# Patient Record
Sex: MALE | Race: WHITE | Employment: OTHER | URBAN - METROPOLITAN AREA
[De-identification: names, ages, dates, MRNs, and addresses within clinical notes are randomized per-mention and may not be internally consistent; named-entity substitution may affect disease eponyms.]

---

## 2019-08-15 ENCOUNTER — HOSPITAL ENCOUNTER (EMERGENCY)
Age: 74
Discharge: HOME | End: 2019-08-15
Payer: MEDICARE

## 2019-08-15 VITALS
TEMPERATURE: 98 F | SYSTOLIC BLOOD PRESSURE: 140 MMHG | HEART RATE: 96 BPM | DIASTOLIC BLOOD PRESSURE: 96 MMHG | RESPIRATION RATE: 20 BRPM | OXYGEN SATURATION: 95 % | WEIGHT: 230 LBS

## 2019-08-15 DIAGNOSIS — S61.412A LACERATION OF LEFT HAND WITHOUT FOREIGN BODY, INITIAL ENCOUNTER: Primary | ICD-10-CM

## 2019-08-15 DIAGNOSIS — S51.812A SKIN TEAR OF LEFT FOREARM WITHOUT COMPLICATION, INITIAL ENCOUNTER: ICD-10-CM

## 2019-08-15 DIAGNOSIS — S40.022A CONTUSION OF LEFT UPPER EXTREMITY, INITIAL ENCOUNTER: ICD-10-CM

## 2019-08-15 PROCEDURE — 99283 EMERGENCY DEPT VISIT LOW MDM: CPT

## 2019-08-15 PROCEDURE — 6370000000 HC RX 637 (ALT 250 FOR IP): Performed by: NURSE PRACTITIONER

## 2019-08-15 RX ORDER — WARFARIN SODIUM 1 MG/1
1 TABLET ORAL
Status: ON HOLD | COMMUNITY
End: 2020-10-05 | Stop reason: HOSPADM

## 2019-08-15 RX ORDER — BACITRACIN, NEOMYCIN, POLYMYXIN B 400; 3.5; 5 [USP'U]/G; MG/G; [USP'U]/G
OINTMENT TOPICAL ONCE
Status: COMPLETED | OUTPATIENT
Start: 2019-08-15 | End: 2019-08-15

## 2019-08-15 RX ORDER — GINSENG 100 MG
CAPSULE ORAL
Qty: 1 TUBE | Refills: 1 | Status: ON HOLD | OUTPATIENT
Start: 2019-08-15 | End: 2020-10-05 | Stop reason: HOSPADM

## 2019-08-15 RX ADMIN — BACITRACIN, NEOMYCIN, POLYMYXIN B 1 G: 400; 3.5; 5 OINTMENT TOPICAL at 19:47

## 2019-08-15 SDOH — HEALTH STABILITY: MENTAL HEALTH: HOW OFTEN DO YOU HAVE A DRINK CONTAINING ALCOHOL?: NEVER

## 2019-08-15 ASSESSMENT — PAIN DESCRIPTION - PAIN TYPE: TYPE: ACUTE PAIN

## 2019-08-15 ASSESSMENT — PAIN DESCRIPTION - ORIENTATION: ORIENTATION: LEFT

## 2019-08-15 ASSESSMENT — ENCOUNTER SYMPTOMS
SHORTNESS OF BREATH: 0
ABDOMINAL PAIN: 0
COUGH: 0
BACK PAIN: 0

## 2019-08-15 ASSESSMENT — PAIN DESCRIPTION - DESCRIPTORS: DESCRIPTORS: ACHING

## 2019-08-15 ASSESSMENT — PAIN SCALES - GENERAL: PAINLEVEL_OUTOF10: 5

## 2019-08-15 ASSESSMENT — PAIN DESCRIPTION - LOCATION: LOCATION: ARM

## 2019-08-16 NOTE — ED PROVIDER NOTES
[address] [facility] ED  eMERGENCY dEPARTMENT eNCOUnter      Pt Name: Chloé Tolbert  MRN: [id number]  [place] 1945  Date of evaluation: 8/15/2019  Provider: BELKIS Rodas CNP      HISTORY OF PRESENT ILLNESS    Chloé Tolbert is a 74 y.o. male who presents to the Emergency Department with L FA, pinky and thumb skin tears after slipping on a steel ramp PTA. Bleeding is stopped. He denies any loc or head injury. Tdap is UTD. REVIEW OF SYSTEMS       Review of Systems   Constitutional: Negative for fever. HENT: Negative for congestion. Respiratory: Negative for cough and shortness of breath. Cardiovascular: Negative for chest pain. Gastrointestinal: Negative for abdominal pain. Genitourinary: Negative for dysuria. Musculoskeletal: Negative for arthralgias and back pain. Skin: Positive for wound. Negative for rash. All other systems reviewed and are negative. PAST MEDICAL HISTORY     Past Medical History:   Diagnosis Date    Diabetes mellitus ([facility] [address]          SURGICAL HISTORY     History reviewed. No pertinent surgical history. CURRENT MEDICATIONS       Previous Medications    METFORMIN (GLUCOPHAGE) 1000 MG TABLET    Take 1,000 mg by mouth 2 times daily (with meals)    WARFARIN (COUMADIN) 1 MG TABLET    Take 1 mg by mouth       ALLERGIES     Patient has no known allergies. FAMILY HISTORY     History reviewed. No pertinent family history. SOCIAL HISTORY       Social History     Socioeconomic History    Marital status:       Spouse name: None    Number of children: None    Years of education: None    Highest education level: None   Occupational History    None   Social Needs    Financial resource strain: None    Food insecurity:     Worry: None     Inability: None    Transportation needs:     Medical: None     Non-medical: None   Tobacco Use    Smoking status: Never Smoker    Smokeless tobacco: Never Used   Substance and Sexual Activity    Alcohol

## 2020-10-04 ENCOUNTER — APPOINTMENT (OUTPATIENT)
Dept: GENERAL RADIOLOGY | Age: 75
DRG: 637 | End: 2020-10-04
Payer: MEDICARE

## 2020-10-04 ENCOUNTER — HOSPITAL ENCOUNTER (INPATIENT)
Age: 75
LOS: 2 days | Discharge: HOME | DRG: 637 | End: 2020-10-06
Attending: FAMILY MEDICINE | Admitting: INTERNAL MEDICINE
Payer: MEDICARE

## 2020-10-04 PROBLEM — I73.89 HHS (HYPOTHENAR HAMMER SYNDROME) (HCC): Status: ACTIVE | Noted: 2020-10-04

## 2020-10-04 PROBLEM — E11.9 TYPE 2 DIABETES MELLITUS WITHOUT COMPLICATIONS (HCC): Status: ACTIVE | Noted: 2020-10-04

## 2020-10-04 LAB
ABO/RH: NORMAL
ALBUMIN SERPL-MCNC: 4 G/DL (ref 3.5–4.6)
ALP BLD-CCNC: 103 U/L (ref 35–104)
ALT SERPL-CCNC: 55 U/L (ref 0–41)
ANION GAP SERPL CALCULATED.3IONS-SCNC: 11 MEQ/L (ref 9–15)
ANION GAP SERPL CALCULATED.3IONS-SCNC: 14 MEQ/L (ref 9–15)
ANION GAP SERPL CALCULATED.3IONS-SCNC: 25 MEQ/L (ref 9–15)
ANTIBODY SCREEN: NORMAL
AST SERPL-CCNC: 38 U/L (ref 0–40)
BACTERIA: NEGATIVE /HPF
BASE EXCESS ARTERIAL: -2 (ref -3–3)
BASOPHILS ABSOLUTE: 0 K/UL (ref 0–0.2)
BASOPHILS RELATIVE PERCENT: 0.5 %
BETA-HYDROXYBUTYRATE: 45.9 MG/DL (ref 0.2–2.8)
BILIRUB SERPL-MCNC: 0.8 MG/DL (ref 0.2–0.7)
BILIRUBIN URINE: NEGATIVE
BLOOD, URINE: ABNORMAL
BUN BLDV-MCNC: 36 MG/DL (ref 8–23)
BUN BLDV-MCNC: 37 MG/DL (ref 8–23)
BUN BLDV-MCNC: 37 MG/DL (ref 8–23)
CALCIUM IONIZED: 1.16 MMOL/L (ref 1.12–1.32)
CALCIUM SERPL-MCNC: 10 MG/DL (ref 8.5–9.9)
CALCIUM SERPL-MCNC: 10.3 MG/DL (ref 8.5–9.9)
CALCIUM SERPL-MCNC: 9.9 MG/DL (ref 8.5–9.9)
CHLORIDE BLD-SCNC: 102 MEQ/L (ref 95–107)
CHLORIDE BLD-SCNC: 104 MEQ/L (ref 95–107)
CHLORIDE BLD-SCNC: 90 MEQ/L (ref 95–107)
CHP ED QC CHECK: YES
CLARITY: CLEAR
CO2: 22 MEQ/L (ref 20–31)
CO2: 27 MEQ/L (ref 20–31)
CO2: 32 MEQ/L (ref 20–31)
COLOR: YELLOW
CREAT SERPL-MCNC: 1.81 MG/DL (ref 0.7–1.2)
CREAT SERPL-MCNC: 1.9 MG/DL (ref 0.7–1.2)
CREAT SERPL-MCNC: 1.96 MG/DL (ref 0.7–1.2)
EKG ATRIAL RATE: 89 BPM
EKG P AXIS: 8 DEGREES
EKG P-R INTERVAL: 164 MS
EKG Q-T INTERVAL: 386 MS
EKG QRS DURATION: 90 MS
EKG QTC CALCULATION (BAZETT): 469 MS
EKG R AXIS: -76 DEGREES
EKG T AXIS: 46 DEGREES
EKG VENTRICULAR RATE: 89 BPM
EOSINOPHILS ABSOLUTE: 0 K/UL (ref 0–0.7)
EOSINOPHILS RELATIVE PERCENT: 0.1 %
EPITHELIAL CELLS, UA: ABNORMAL /HPF (ref 0–5)
GFR AFRICAN AMERICAN: 35
GFR AFRICAN AMERICAN: 40.3
GFR AFRICAN AMERICAN: 41.8
GFR AFRICAN AMERICAN: 44.2
GFR NON-AFRICAN AMERICAN: 29
GFR NON-AFRICAN AMERICAN: 33.3
GFR NON-AFRICAN AMERICAN: 34.5
GFR NON-AFRICAN AMERICAN: 36.5
GLOBULIN: 4.5 G/DL (ref 2.3–3.5)
GLUCOSE BLD-MCNC: 348 MG/DL (ref 60–115)
GLUCOSE BLD-MCNC: 367 MG/DL (ref 60–115)
GLUCOSE BLD-MCNC: 375 MG/DL (ref 60–115)
GLUCOSE BLD-MCNC: 408 MG/DL (ref 60–115)
GLUCOSE BLD-MCNC: 422 MG/DL (ref 60–115)
GLUCOSE BLD-MCNC: 425 MG/DL (ref 70–99)
GLUCOSE BLD-MCNC: 453 MG/DL (ref 60–115)
GLUCOSE BLD-MCNC: 462 MG/DL (ref 70–99)
GLUCOSE BLD-MCNC: 987 MG/DL (ref 70–99)
GLUCOSE BLD-MCNC: >600 MG/DL (ref 60–115)
GLUCOSE BLD-MCNC: >600 MG/DL (ref 60–115)
GLUCOSE BLD-MCNC: >700 MG/DL (ref 60–115)
GLUCOSE BLD-MCNC: NORMAL MG/DL
GLUCOSE URINE: >=1000 MG/DL
HBA1C MFR BLD: 13.1 % (ref 4.8–5.9)
HCO3 ARTERIAL: 23.9 MMOL/L (ref 21–29)
HCT VFR BLD CALC: 57.3 % (ref 42–52)
HEMOGLOBIN: 17.7 G/DL (ref 14–18)
HEMOGLOBIN: 19 GM/DL (ref 13.5–17.5)
HYALINE CASTS: ABNORMAL /HPF (ref 0–5)
INR BLD: 1.2
KETONES, URINE: 15 MG/DL
LACTATE: 2.69 MMOL/L (ref 0.4–2)
LACTIC ACID, SEPSIS: 4.3 MMOL/L (ref 0.5–1.9)
LACTIC ACID, SEPSIS: 4.4 MMOL/L (ref 0.5–1.9)
LEUKOCYTE ESTERASE, URINE: ABNORMAL
LYMPHOCYTES ABSOLUTE: 1 K/UL (ref 1–4.8)
LYMPHOCYTES RELATIVE PERCENT: 11.6 %
MAGNESIUM: 2.5 MG/DL (ref 1.7–2.4)
MAGNESIUM: 2.6 MG/DL (ref 1.7–2.4)
MCH RBC QN AUTO: 31.6 PG (ref 27–31.3)
MCHC RBC AUTO-ENTMCNC: 30.8 % (ref 33–37)
MCV RBC AUTO: 102.4 FL (ref 80–100)
MONOCYTES ABSOLUTE: 0.5 K/UL (ref 0.2–0.8)
MONOCYTES RELATIVE PERCENT: 5.1 %
NEUTROPHILS ABSOLUTE: 7.4 K/UL (ref 1.4–6.5)
NEUTROPHILS RELATIVE PERCENT: 82.7 %
NITRITE, URINE: NEGATIVE
O2 SAT, ARTERIAL: 90 % (ref 93–100)
PCO2 ARTERIAL: 42 MM HG (ref 35–45)
PDW BLD-RTO: 14.9 % (ref 11.5–14.5)
PERFORMED ON: ABNORMAL
PH ARTERIAL: 7.37 (ref 7.35–7.45)
PH UA: 5 (ref 5–9)
PHOSPHORUS: 3.6 MG/DL (ref 2.3–4.8)
PHOSPHORUS: 3.8 MG/DL (ref 2.3–4.8)
PLATELET # BLD: 165 K/UL (ref 130–400)
PO2 ARTERIAL: 61 MM HG (ref 75–108)
POC CHLORIDE: 102 MEQ/L (ref 99–110)
POC CREATININE: 2.2 MG/DL (ref 0.8–1.3)
POC HEMATOCRIT: 56 % (ref 41–53)
POC POTASSIUM: 5.4 MEQ/L (ref 3.5–5.1)
POC SAMPLE TYPE: ABNORMAL
POC SODIUM: 136 MEQ/L (ref 136–145)
POTASSIUM REFLEX MAGNESIUM: 5.9 MEQ/L (ref 3.4–4.9)
POTASSIUM SERPL-SCNC: 4.5 MEQ/L (ref 3.4–4.9)
POTASSIUM SERPL-SCNC: 4.5 MEQ/L (ref 3.4–4.9)
PROTEIN UA: NEGATIVE MG/DL
PROTHROMBIN TIME: 15.5 SEC (ref 12.3–14.9)
RBC # BLD: 5.6 M/UL (ref 4.7–6.1)
RBC UA: ABNORMAL /HPF (ref 0–2)
SODIUM BLD-SCNC: 137 MEQ/L (ref 135–144)
SODIUM BLD-SCNC: 143 MEQ/L (ref 135–144)
SODIUM BLD-SCNC: 147 MEQ/L (ref 135–144)
SPECIFIC GRAVITY UA: 1.04 (ref 1–1.03)
TCO2 ARTERIAL: 25 (ref 22–29)
TOTAL PROTEIN: 8.5 G/DL (ref 6.3–8)
TROPONIN: 0.06 NG/ML (ref 0–0.01)
URINE REFLEX TO CULTURE: YES
UROBILINOGEN, URINE: 0.2 E.U./DL
WBC # BLD: 8.9 K/UL (ref 4.8–10.8)
WBC UA: ABNORMAL /HPF (ref 0–5)
YEAST: PRESENT /HPF

## 2020-10-04 PROCEDURE — 82435 ASSAY OF BLOOD CHLORIDE: CPT

## 2020-10-04 PROCEDURE — 84132 ASSAY OF SERUM POTASSIUM: CPT

## 2020-10-04 PROCEDURE — 82010 KETONE BODYS QUAN: CPT

## 2020-10-04 PROCEDURE — 86901 BLOOD TYPING SEROLOGIC RH(D): CPT

## 2020-10-04 PROCEDURE — 6370000000 HC RX 637 (ALT 250 FOR IP): Performed by: FAMILY MEDICINE

## 2020-10-04 PROCEDURE — 87040 BLOOD CULTURE FOR BACTERIA: CPT

## 2020-10-04 PROCEDURE — 86850 RBC ANTIBODY SCREEN: CPT

## 2020-10-04 PROCEDURE — 96361 HYDRATE IV INFUSION ADD-ON: CPT

## 2020-10-04 PROCEDURE — 83605 ASSAY OF LACTIC ACID: CPT

## 2020-10-04 PROCEDURE — 93005 ELECTROCARDIOGRAM TRACING: CPT | Performed by: FAMILY MEDICINE

## 2020-10-04 PROCEDURE — 96376 TX/PRO/DX INJ SAME DRUG ADON: CPT

## 2020-10-04 PROCEDURE — 82948 REAGENT STRIP/BLOOD GLUCOSE: CPT

## 2020-10-04 PROCEDURE — 2580000003 HC RX 258: Performed by: INTERNAL MEDICINE

## 2020-10-04 PROCEDURE — 99284 EMERGENCY DEPT VISIT MOD MDM: CPT

## 2020-10-04 PROCEDURE — 6370000000 HC RX 637 (ALT 250 FOR IP): Performed by: INTERNAL MEDICINE

## 2020-10-04 PROCEDURE — 71045 X-RAY EXAM CHEST 1 VIEW: CPT

## 2020-10-04 PROCEDURE — 80053 COMPREHEN METABOLIC PANEL: CPT

## 2020-10-04 PROCEDURE — 85610 PROTHROMBIN TIME: CPT

## 2020-10-04 PROCEDURE — 85025 COMPLETE CBC W/AUTO DIFF WBC: CPT

## 2020-10-04 PROCEDURE — 99285 EMERGENCY DEPT VISIT HI MDM: CPT

## 2020-10-04 PROCEDURE — 84100 ASSAY OF PHOSPHORUS: CPT

## 2020-10-04 PROCEDURE — 81001 URINALYSIS AUTO W/SCOPE: CPT

## 2020-10-04 PROCEDURE — 82330 ASSAY OF CALCIUM: CPT

## 2020-10-04 PROCEDURE — 84484 ASSAY OF TROPONIN QUANT: CPT

## 2020-10-04 PROCEDURE — 36600 WITHDRAWAL OF ARTERIAL BLOOD: CPT

## 2020-10-04 PROCEDURE — 1210000000 HC MED SURG R&B

## 2020-10-04 PROCEDURE — 2060000000 HC ICU INTERMEDIATE R&B

## 2020-10-04 PROCEDURE — G0378 HOSPITAL OBSERVATION PER HR: HCPCS

## 2020-10-04 PROCEDURE — 83735 ASSAY OF MAGNESIUM: CPT

## 2020-10-04 PROCEDURE — 36415 COLL VENOUS BLD VENIPUNCTURE: CPT

## 2020-10-04 PROCEDURE — 83036 HEMOGLOBIN GLYCOSYLATED A1C: CPT

## 2020-10-04 PROCEDURE — 82565 ASSAY OF CREATININE: CPT

## 2020-10-04 PROCEDURE — 2580000003 HC RX 258: Performed by: FAMILY MEDICINE

## 2020-10-04 PROCEDURE — 84295 ASSAY OF SERUM SODIUM: CPT

## 2020-10-04 PROCEDURE — 96374 THER/PROPH/DIAG INJ IV PUSH: CPT

## 2020-10-04 PROCEDURE — 82803 BLOOD GASES ANY COMBINATION: CPT

## 2020-10-04 PROCEDURE — 86900 BLOOD TYPING SEROLOGIC ABO: CPT

## 2020-10-04 PROCEDURE — 85014 HEMATOCRIT: CPT

## 2020-10-04 RX ORDER — ATORVASTATIN CALCIUM 20 MG/1
TABLET, FILM COATED ORAL
Status: ON HOLD | COMMUNITY
Start: 2019-03-18 | End: 2020-10-05 | Stop reason: HOSPADM

## 2020-10-04 RX ORDER — DEXTROSE, SODIUM CHLORIDE, AND POTASSIUM CHLORIDE 5; .45; .15 G/100ML; G/100ML; G/100ML
INJECTION INTRAVENOUS CONTINUOUS PRN
Status: DISCONTINUED | OUTPATIENT
Start: 2020-10-04 | End: 2020-10-04

## 2020-10-04 RX ORDER — LISINOPRIL 20 MG/1
TABLET ORAL
Status: ON HOLD | COMMUNITY
Start: 2019-03-18 | End: 2020-10-05 | Stop reason: HOSPADM

## 2020-10-04 RX ORDER — GEMFIBROZIL 600 MG/1
600 TABLET, FILM COATED ORAL 2 TIMES DAILY
Status: ON HOLD | COMMUNITY
Start: 2019-03-18 | End: 2020-10-05 | Stop reason: HOSPADM

## 2020-10-04 RX ORDER — 0.9 % SODIUM CHLORIDE 0.9 %
1000 INTRAVENOUS SOLUTION INTRAVENOUS ONCE
Status: COMPLETED | OUTPATIENT
Start: 2020-10-04 | End: 2020-10-04

## 2020-10-04 RX ORDER — DEXTROSE MONOHYDRATE 25 G/50ML
12.5 INJECTION, SOLUTION INTRAVENOUS PRN
Status: DISCONTINUED | OUTPATIENT
Start: 2020-10-04 | End: 2020-10-06 | Stop reason: HOSPADM

## 2020-10-04 RX ORDER — SODIUM CHLORIDE 450 MG/100ML
INJECTION, SOLUTION INTRAVENOUS CONTINUOUS
Status: DISCONTINUED | OUTPATIENT
Start: 2020-10-04 | End: 2020-10-06 | Stop reason: HOSPADM

## 2020-10-04 RX ORDER — POTASSIUM CHLORIDE 7.45 MG/ML
10 INJECTION INTRAVENOUS PRN
Status: DISCONTINUED | OUTPATIENT
Start: 2020-10-04 | End: 2020-10-06 | Stop reason: HOSPADM

## 2020-10-04 RX ORDER — 0.9 % SODIUM CHLORIDE 0.9 %
15 INTRAVENOUS SOLUTION INTRAVENOUS ONCE
Status: DISCONTINUED | OUTPATIENT
Start: 2020-10-04 | End: 2020-10-04

## 2020-10-04 RX ORDER — DEXTROSE AND SODIUM CHLORIDE 5; .45 G/100ML; G/100ML
INJECTION, SOLUTION INTRAVENOUS CONTINUOUS PRN
Status: DISCONTINUED | OUTPATIENT
Start: 2020-10-04 | End: 2020-10-06 | Stop reason: HOSPADM

## 2020-10-04 RX ORDER — MAGNESIUM SULFATE 1 G/100ML
1 INJECTION INTRAVENOUS PRN
Status: DISCONTINUED | OUTPATIENT
Start: 2020-10-04 | End: 2020-10-06 | Stop reason: HOSPADM

## 2020-10-04 RX ORDER — SODIUM CHLORIDE 9 MG/ML
INJECTION, SOLUTION INTRAVENOUS CONTINUOUS
Status: DISCONTINUED | OUTPATIENT
Start: 2020-10-04 | End: 2020-10-04

## 2020-10-04 RX ADMIN — SODIUM CHLORIDE 0.03 UNITS/KG/HR: 9 INJECTION, SOLUTION INTRAVENOUS at 17:34

## 2020-10-04 RX ADMIN — SODIUM CHLORIDE: 4.5 INJECTION, SOLUTION INTRAVENOUS at 17:24

## 2020-10-04 RX ADMIN — SODIUM CHLORIDE 0.1 UNITS/KG/HR: 9 INJECTION, SOLUTION INTRAVENOUS at 14:20

## 2020-10-04 RX ADMIN — SODIUM CHLORIDE 1000 ML: 9 INJECTION, SOLUTION INTRAVENOUS at 13:25

## 2020-10-04 RX ADMIN — INSULIN HUMAN 10 UNITS: 100 INJECTION, SOLUTION PARENTERAL at 14:21

## 2020-10-04 RX ADMIN — SODIUM CHLORIDE: 4.5 INJECTION, SOLUTION INTRAVENOUS at 21:53

## 2020-10-04 ASSESSMENT — ENCOUNTER SYMPTOMS
ALLERGIC/IMMUNOLOGIC NEGATIVE: 1
GASTROINTESTINAL NEGATIVE: 1
RESPIRATORY NEGATIVE: 1

## 2020-10-04 NOTE — FLOWSHEET NOTE
1600  Patient up to the floor via cart at this time. Insulin drip running at 10 units per hour. Patient is ambulatory to the bed with a standby assist.  Patient is alert and oriented times 4. Patient's grandson is at bedside with patient and he is a good historian. Patient denies any chest pain, shortness of breath, dizziness, weakness, abdominal pain, nausea, or vomiting at this time. Patient states that he is going to leave at this time. RN placed avasys in the room. Patient states that he is extremely thirsty and RN explained that he is NPO at this time. 1630  Glucose was taken and it was 487. Alla CRANE and Abdirizak Denton RN decreased the insulin drip to 5 units per hour per protocol. [facility] communication asked RN to decrease the drip to 3 units per hour. Ghulam Ahumada RN and Kenna Martinez RN verified in the system.

## 2020-10-04 NOTE — ED NOTES
Pt remains without complaints. A & Ox4. Skin pink warm and dry.  Pt still declining to change into gown      Parish Ngo RN  10/04/20 1316

## 2020-10-04 NOTE — ED NOTES
Milena came out to the desk and advised us pt has been spitting up blood for the past 2 years.       Shari Alexandre RN  10/04/20 2168

## 2020-10-04 NOTE — ED TRIAGE NOTES
A & Ox4. Skin pink warm and dry. Pt states he just can't get enough water to drink. Denies pain. States he just has been feeling tired and weak lately.

## 2020-10-04 NOTE — ED NOTES
Transporter here. Pt without any complaints. Remains A & Ox4. Skin pink warm and dry. Grandson brought 2 medicine bottles at this time. He is going up with patient and will carry the 2 med bottles with him. Tele pack and mask intact.       Manoj Abreu RN  10/04/20 [id number]

## 2020-10-04 NOTE — ED NOTES
277 assigned. [place] called 15 min warning and for tele pack.      Patricia Krueger RN  10/04/20 3630

## 2020-10-04 NOTE — ED NOTES
Lactic acid drawn off of IV after wasting 10ml.  Flushed with NS after and reattached NS/insulin drip     Jarrod Marino RN  10/04/20 [id number]

## 2020-10-04 NOTE — ED PROVIDER NOTES
[address] [facility] ED  eMERGENCY dEPARTMENT eNCOUnter      Pt Name: Keri Henriquez  MRN: [id number]  [place] 1945  Date of evaluation: 10/4/2020  Provider: Radha Armijo MD    CHIEF COMPLAINT       Chief Complaint   Patient presents with    Fatigue     increased confusion over the last 5 days     Dehydration     cant't get enough  water    66years old man with known history of diabetes    HISTORY OF PRESENT ILLNESS   (Location/Symptom, Timing/Onset,Context/Setting, Quality, Duration, Modifying Factors, Severity)  Note limiting factors. Keri Henriquez is a 66 y.o. male who presents to the emergency department dehydration and confusion  66years old male patient with known history of diabetes state have been off his medication for over a year year after his physician have retired and he had hard time finding a new physician. Presented today to the ER stating he have been feeling very dry cannot get enough water week and have some mild confusion for the last few days denies any chest pain shortness of breath denies any cough congestion denies any fever claims that he had been urinating okay denies any other complaint or concern    The history is provided by the patient. NursingNotes were reviewed. REVIEW OF SYSTEMS    (2-9 systems for level 4, 10 or more for level 5)     Review of Systems   Constitutional: Positive for fatigue. HENT: Negative. Respiratory: Negative. Cardiovascular: Negative. Gastrointestinal: Negative. Endocrine: Positive for polydipsia, polyphagia and polyuria. Musculoskeletal: Negative. Skin: Negative. Allergic/Immunologic: Negative. Neurological: Negative. Hematological: Negative. Psychiatric/Behavioral: Positive for confusion. All other systems reviewed and are negative. Except as noted above the remainder of the review of systems was reviewed and negative.        PAST MEDICAL HISTORY     Past Medical History:   Diagnosis Date    Diabetes mellitus ([facility] [address]          SURGICALHISTORY     History reviewed. No pertinent surgical history. CURRENT MEDICATIONS       Previous Medications    ATORVASTATIN (LIPITOR) 20 MG TABLET    TAKE ONE TABLET BY MOUTH AT BEDTIME    BACITRACIN 500 UNIT/GM OINTMENT    Apply topically 2 times daily. GEMFIBROZIL (LOPID) 600 MG TABLET    Take 600 mg by mouth 2 times daily    LISINOPRIL (PRINIVIL;ZESTRIL) 20 MG TABLET    TAKE ONE TABLET BY MOUTH EVERY DAY    METFORMIN (GLUCOPHAGE) 1000 MG TABLET    Take 1,000 mg by mouth 2 times daily (with meals)    WARFARIN (COUMADIN) 1 MG TABLET    Take 1 mg by mouth       ALLERGIES     Patient has no known allergies. FAMILY HISTORY     History reviewed. No pertinent family history. SOCIAL HISTORY       Social History     Socioeconomic History    Marital status:       Spouse name: None    Number of children: None    Years of education: None    Highest education level: None   Occupational History    None   Social Needs    Financial resource strain: None    Food insecurity     Worry: None     Inability: None    Transportation needs     Medical: None     Non-medical: None   Tobacco Use    Smoking status: Never Smoker    Smokeless tobacco: Never Used   Substance and Sexual Activity    Alcohol use: Never     Frequency: Never    Drug use: Never    Sexual activity: None   Lifestyle    Physical activity     Days per week: None     Minutes per session: None    Stress: None   Relationships    Social connections     Talks on phone: None     Gets together: None     Attends [employer]ism service: None     Active member of club or organization: None     Attends meetings of clubs or organizations: None     Relationship status: None    Intimate partner violence     Fear of current or ex partner: None     Emotionally abused: None     Physically abused: None     Forced sexual activity: None   Other Topics Concern    None   Social History Narrative    None       SCREENINGS @FLOW([id number])@      PHYSICAL EXAM    (up to 7 for level 4, 8 or more for level 5)     ED Triage Vitals   BP Temp Temp Source Pulse Resp SpO2 Height Weight   10/04/20 1330 10/04/20 1223 10/04/20 1223 10/04/20 1223 10/04/20 1223 10/04/20 1223 10/04/20 1223 10/04/20 1223   (!) 149/99 97.8 °F (36.6 °C) Oral 106 18 96 % 5' 8.5\" (1.74 m) 220 lb (99.8 kg)       Physical Exam  Constitutional:       Appearance: Normal appearance. He is obese. HENT:      Head: Normocephalic and atraumatic. Mouth/Throat:      Mouth: Mucous membranes are dry. Eyes:      General: No visual field deficit. Extraocular Movements: Extraocular movements intact. Pupils: Pupils are equal, round, and reactive to light. Neck:      Musculoskeletal: Normal range of motion and neck supple. Cardiovascular:      Rate and Rhythm: Normal rate and regular rhythm. Pulmonary:      Effort: Pulmonary effort is normal.      Breath sounds: Normal breath sounds. Abdominal:      General: Abdomen is flat. Palpations: Abdomen is soft. Skin:     General: Skin is warm and dry. Neurological:      General: No focal deficit present. Mental Status: He is alert and oriented to person, place, and time. GCS: GCS eye subscore is 4. GCS verbal subscore is 5. GCS motor subscore is 6. Cranial Nerves: No cranial nerve deficit, dysarthria or facial asymmetry. Sensory: Sensation is intact. No sensory deficit. Motor: No weakness or tremor. Coordination: Coordination is intact. Coordination normal.      Gait: Gait normal.   Psychiatric:         Mood and Affect: Mood normal.         Behavior: Behavior normal.         Thought Content:  Thought content normal.         Judgment: Judgment normal.         DIAGNOSTIC RESULTS     EKG: All EKG's are interpreted by the Emergency Department Physician who either signs or Co-signsthis chart in the absence of a cardiologist.    EKG: Sinus rhythm with PVCs left axis deviation no change from previous EKG.        RADIOLOGY:   Non-plain filmimages such as CT, Ultrasound and MRI are read by the radiologist. Plain radiographic images are visualized and preliminarily interpreted by the emergency physician with the below findings:        Interpretation per the Radiologist below, if available at the time ofthis note:    No orders to display         ED BEDSIDE ULTRASOUND:   Performed by ED Physician - none    LABS:  Labs Reviewed   COMPREHENSIVE METABOLIC PANEL W/ REFLEX TO MG FOR LOW K - Abnormal; Notable for the following components:       Result Value    Potassium reflex Magnesium 5.9 (*)     Chloride 90 (*)     Anion Gap 25 (*)     Glucose 987 (*)     BUN 36 (*)     CREATININE 1.96 (*)     GFR Non- 33.3 (*)     GFR  40.3 (*)     Calcium 10.3 (*)     Total Protein 8.5 (*)     Total Bilirubin 0.8 (*)     ALT 55 (*)     Globulin 4.5 (*)     All other components within normal limits    Narrative:     CALL  [place]  [facility] tel. [phone],  GLU results called to and read back by DR BOSWELL, 10/04/2020 14:21, by [facility]   LACTATE, SEPSIS - Abnormal; Notable for the following components:    Lactic Acid, Sepsis 4.4 (*)     All other components within normal limits    Narrative:     Mary Batool tel. [phone],  LACID results called to and read back by Morena Frankel RN, 10/04/2020 13:56,  by [facility]   CBC WITH AUTO DIFFERENTIAL - Abnormal; Notable for the following components:    Hematocrit 57.3 (*)     .4 (*)     MCH 31.6 (*)     MCHC 30.8 (*)     RDW 14.9 (*)     Neutrophils Absolute 7.4 (*)     All other components within normal limits   PROTIME-INR - Abnormal; Notable for the following components:    Protime 15.5 (*)     All other components within normal limits   BETA-HYDROXYBUTYRATE - Abnormal; Notable for the following components:    Beta-Hydroxybutyrate 45.9 (*)     All other components within normal limits   TROPONIN - Abnormal; Notable for the following components: management comments: 66years old diabetic will have not been compliant with diabetic medicine for the last few months after his physician have retired presented to the ER with polyuria polydipsia mild confusion but had normal exam except for severe dehydration on exam lab confirmed diabetic ketoacidosis IV fluid and insulin drip was initiated in the ER patient will be admitted to the hospital under the hospitalist service for further management  Remained hemodynamically stable awake alert oriented x3 with no apparent distress no sign of confusion during his stay in the ER       Amount and/or Complexity of Data Reviewed  Clinical lab tests: ordered and reviewed  Tests in the radiology section of CPT®: ordered and reviewed    Risk of Complications, Morbidity, and/or Mortality  Presenting problems: high  Diagnostic procedures: moderate    Critical Care  Total time providing critical care: 30-74 minutes    Patient Progress  Patient progress: improved     CONSULTS:  None    PROCEDURES:  Unless otherwise noted below, none     Procedures    FINAL IMPRESSION      1. Diabetic ketoacidosis without coma associated with type 2 diabetes mellitus ([facility] [address]    2. Acute renal failure, unspecified acute renal failure type [facility])          DISPOSITION/PLAN   DISPOSITION Admitted 10/04/2020 02:34:31 PM      PATIENT REFERRED TO:  No follow-up provider specified.     DISCHARGE MEDICATIONS:  New Prescriptions    No medications on file          (Please note thatportions of this note were completed with a voice recognition program.  Efforts were made to edit the dictations but occasionally words are mis-transcribed.)    Jaimie Weinstein MD (electronically signed)  Attending Emergency Physician          Abbey Nam MD  10/04/20 Gabriella 2364 MD Clementine  10/04/20 [phone]

## 2020-10-05 PROBLEM — E11.10 DKA (DIABETIC KETOACIDOSIS) (HCC): Status: ACTIVE | Noted: 2020-10-05

## 2020-10-05 PROBLEM — N17.9 AKI (ACUTE KIDNEY INJURY) (HCC): Status: ACTIVE | Noted: 2020-10-05

## 2020-10-05 PROBLEM — E11.65 TYPE 2 DIABETES MELLITUS WITH HYPERGLYCEMIA (HCC): Status: ACTIVE | Noted: 2020-10-05

## 2020-10-05 PROBLEM — E11.00 HYPEROSMOLAR HYPERGLYCEMIC STATE (HHS) (HCC): Status: ACTIVE | Noted: 2020-10-05

## 2020-10-05 LAB
ANION GAP SERPL CALCULATED.3IONS-SCNC: 11 MEQ/L (ref 9–15)
ANION GAP SERPL CALCULATED.3IONS-SCNC: 13 MEQ/L (ref 9–15)
ANION GAP SERPL CALCULATED.3IONS-SCNC: 13 MEQ/L (ref 9–15)
ANION GAP SERPL CALCULATED.3IONS-SCNC: 14 MEQ/L (ref 9–15)
ANION GAP SERPL CALCULATED.3IONS-SCNC: 9 MEQ/L (ref 9–15)
ANISOCYTOSIS: ABNORMAL
BASOPHILS ABSOLUTE: 0.1 K/UL (ref 0–0.2)
BASOPHILS RELATIVE PERCENT: 1.1 %
BUN BLDV-MCNC: 31 MG/DL (ref 8–23)
BUN BLDV-MCNC: 32 MG/DL (ref 8–23)
BUN BLDV-MCNC: 32 MG/DL (ref 8–23)
BUN BLDV-MCNC: 33 MG/DL (ref 8–23)
BUN BLDV-MCNC: 35 MG/DL (ref 8–23)
CALCIUM SERPL-MCNC: 8.5 MG/DL (ref 8.5–9.9)
CALCIUM SERPL-MCNC: 8.9 MG/DL (ref 8.5–9.9)
CALCIUM SERPL-MCNC: 9.3 MG/DL (ref 8.5–9.9)
CALCIUM SERPL-MCNC: 9.3 MG/DL (ref 8.5–9.9)
CALCIUM SERPL-MCNC: 9.5 MG/DL (ref 8.5–9.9)
CHLORIDE BLD-SCNC: 100 MEQ/L (ref 95–107)
CHLORIDE BLD-SCNC: 104 MEQ/L (ref 95–107)
CHLORIDE BLD-SCNC: 104 MEQ/L (ref 95–107)
CHLORIDE BLD-SCNC: 105 MEQ/L (ref 95–107)
CHLORIDE BLD-SCNC: 106 MEQ/L (ref 95–107)
CO2: 25 MEQ/L (ref 20–31)
CO2: 25 MEQ/L (ref 20–31)
CO2: 27 MEQ/L (ref 20–31)
CO2: 28 MEQ/L (ref 20–31)
CO2: 32 MEQ/L (ref 20–31)
CREAT SERPL-MCNC: 1.46 MG/DL (ref 0.7–1.2)
CREAT SERPL-MCNC: 1.48 MG/DL (ref 0.7–1.2)
CREAT SERPL-MCNC: 1.5 MG/DL (ref 0.7–1.2)
CREAT SERPL-MCNC: 1.61 MG/DL (ref 0.7–1.2)
CREAT SERPL-MCNC: 1.74 MG/DL (ref 0.7–1.2)
EOSINOPHILS ABSOLUTE: 0.2 K/UL (ref 0–0.7)
EOSINOPHILS RELATIVE PERCENT: 2 %
GFR AFRICAN AMERICAN: 46.3
GFR AFRICAN AMERICAN: 50.6
GFR AFRICAN AMERICAN: 54.9
GFR AFRICAN AMERICAN: 55.8
GFR AFRICAN AMERICAN: 56.7
GFR NON-AFRICAN AMERICAN: 38.2
GFR NON-AFRICAN AMERICAN: 41.8
GFR NON-AFRICAN AMERICAN: 45.4
GFR NON-AFRICAN AMERICAN: 46.1
GFR NON-AFRICAN AMERICAN: 46.8
GLUCOSE BLD-MCNC: 166 MG/DL (ref 60–115)
GLUCOSE BLD-MCNC: 179 MG/DL (ref 60–115)
GLUCOSE BLD-MCNC: 193 MG/DL (ref 60–115)
GLUCOSE BLD-MCNC: 198 MG/DL (ref 60–115)
GLUCOSE BLD-MCNC: 200 MG/DL (ref 60–115)
GLUCOSE BLD-MCNC: 201 MG/DL (ref 60–115)
GLUCOSE BLD-MCNC: 206 MG/DL (ref 70–99)
GLUCOSE BLD-MCNC: 211 MG/DL (ref 60–115)
GLUCOSE BLD-MCNC: 232 MG/DL (ref 60–115)
GLUCOSE BLD-MCNC: 251 MG/DL (ref 70–99)
GLUCOSE BLD-MCNC: 261 MG/DL (ref 60–115)
GLUCOSE BLD-MCNC: 264 MG/DL (ref 60–115)
GLUCOSE BLD-MCNC: 264 MG/DL (ref 70–99)
GLUCOSE BLD-MCNC: 277 MG/DL (ref 60–115)
GLUCOSE BLD-MCNC: 278 MG/DL (ref 60–115)
GLUCOSE BLD-MCNC: 285 MG/DL (ref 60–115)
GLUCOSE BLD-MCNC: 286 MG/DL (ref 60–115)
GLUCOSE BLD-MCNC: 309 MG/DL (ref 70–99)
GLUCOSE BLD-MCNC: 334 MG/DL (ref 60–115)
GLUCOSE BLD-MCNC: 341 MG/DL (ref 60–115)
GLUCOSE BLD-MCNC: 423 MG/DL (ref 70–99)
HCT VFR BLD CALC: 49.4 % (ref 42–52)
HEMOGLOBIN: 16.6 G/DL (ref 14–18)
LYMPHOCYTES ABSOLUTE: 2.7 K/UL (ref 1–4.8)
LYMPHOCYTES RELATIVE PERCENT: 25.5 %
MAGNESIUM: 1.9 MG/DL (ref 1.7–2.4)
MAGNESIUM: 2 MG/DL (ref 1.7–2.4)
MAGNESIUM: 2.1 MG/DL (ref 1.7–2.4)
MAGNESIUM: 2.3 MG/DL (ref 1.7–2.4)
MAGNESIUM: 2.3 MG/DL (ref 1.7–2.4)
MCH RBC QN AUTO: 32.5 PG (ref 27–31.3)
MCHC RBC AUTO-ENTMCNC: 33.5 % (ref 33–37)
MCV RBC AUTO: 96.8 FL (ref 80–100)
MONOCYTES ABSOLUTE: 0.7 K/UL (ref 0.2–0.8)
MONOCYTES RELATIVE PERCENT: 6.8 %
NEUTROPHILS ABSOLUTE: 6.8 K/UL (ref 1.4–6.5)
NEUTROPHILS RELATIVE PERCENT: 64.6 %
PDW BLD-RTO: 13.8 % (ref 11.5–14.5)
PERFORMED ON: ABNORMAL
PHOSPHORUS: 2.6 MG/DL (ref 2.3–4.8)
PHOSPHORUS: 2.7 MG/DL (ref 2.3–4.8)
PHOSPHORUS: 2.9 MG/DL (ref 2.3–4.8)
PHOSPHORUS: 3 MG/DL (ref 2.3–4.8)
PHOSPHORUS: 3.1 MG/DL (ref 2.3–4.8)
PLATELET # BLD: 140 K/UL (ref 130–400)
PLATELET SLIDE REVIEW: NORMAL
POTASSIUM SERPL-SCNC: 3.6 MEQ/L (ref 3.4–4.9)
POTASSIUM SERPL-SCNC: 3.9 MEQ/L (ref 3.4–4.9)
POTASSIUM SERPL-SCNC: 4 MEQ/L (ref 3.4–4.9)
POTASSIUM SERPL-SCNC: 4.4 MEQ/L (ref 3.4–4.9)
POTASSIUM SERPL-SCNC: 4.5 MEQ/L (ref 3.4–4.9)
RBC # BLD: 5.11 M/UL (ref 4.7–6.1)
SLIDE REVIEW: ABNORMAL
SODIUM BLD-SCNC: 138 MEQ/L (ref 135–144)
SODIUM BLD-SCNC: 142 MEQ/L (ref 135–144)
SODIUM BLD-SCNC: 143 MEQ/L (ref 135–144)
SODIUM BLD-SCNC: 146 MEQ/L (ref 135–144)
SODIUM BLD-SCNC: 147 MEQ/L (ref 135–144)
WBC # BLD: 10.5 K/UL (ref 4.8–10.8)

## 2020-10-05 PROCEDURE — 83735 ASSAY OF MAGNESIUM: CPT

## 2020-10-05 PROCEDURE — 84100 ASSAY OF PHOSPHORUS: CPT

## 2020-10-05 PROCEDURE — 6370000000 HC RX 637 (ALT 250 FOR IP): Performed by: INTERNAL MEDICINE

## 2020-10-05 PROCEDURE — G0378 HOSPITAL OBSERVATION PER HR: HCPCS

## 2020-10-05 PROCEDURE — 36415 COLL VENOUS BLD VENIPUNCTURE: CPT

## 2020-10-05 PROCEDURE — 96361 HYDRATE IV INFUSION ADD-ON: CPT

## 2020-10-05 PROCEDURE — 96376 TX/PRO/DX INJ SAME DRUG ADON: CPT

## 2020-10-05 PROCEDURE — 96372 THER/PROPH/DIAG INJ SC/IM: CPT

## 2020-10-05 PROCEDURE — 96365 THER/PROPH/DIAG IV INF INIT: CPT

## 2020-10-05 PROCEDURE — 93010 ELECTROCARDIOGRAM REPORT: CPT | Performed by: INTERNAL MEDICINE

## 2020-10-05 PROCEDURE — 80048 BASIC METABOLIC PNL TOTAL CA: CPT

## 2020-10-05 PROCEDURE — 2060000000 HC ICU INTERMEDIATE R&B

## 2020-10-05 PROCEDURE — 85025 COMPLETE CBC W/AUTO DIFF WBC: CPT

## 2020-10-05 PROCEDURE — 2580000003 HC RX 258: Performed by: INTERNAL MEDICINE

## 2020-10-05 PROCEDURE — 6360000002 HC RX W HCPCS: Performed by: INTERNAL MEDICINE

## 2020-10-05 PROCEDURE — 99222 1ST HOSP IP/OBS MODERATE 55: CPT | Performed by: INTERNAL MEDICINE

## 2020-10-05 RX ORDER — SODIUM CHLORIDE, SODIUM LACTATE, POTASSIUM CHLORIDE, AND CALCIUM CHLORIDE .6; .31; .03; .02 G/100ML; G/100ML; G/100ML; G/100ML
1000 INJECTION, SOLUTION INTRAVENOUS ONCE
Status: COMPLETED | OUTPATIENT
Start: 2020-10-05 | End: 2020-10-05

## 2020-10-05 RX ORDER — INSULIN GLARGINE 100 [IU]/ML
60 INJECTION, SOLUTION SUBCUTANEOUS NIGHTLY
Status: DISCONTINUED | OUTPATIENT
Start: 2020-10-05 | End: 2020-10-06

## 2020-10-05 RX ORDER — LISINOPRIL 20 MG/1
20 TABLET ORAL DAILY
Qty: 30 TABLET | Refills: 0 | Status: SHIPPED | OUTPATIENT
Start: 2020-10-05

## 2020-10-05 RX ORDER — ATORVASTATIN CALCIUM 40 MG/1
40 TABLET, FILM COATED ORAL DAILY
Qty: 30 TABLET | Refills: 0 | Status: SHIPPED | OUTPATIENT
Start: 2020-10-05 | End: 2020-10-05 | Stop reason: SDUPTHER

## 2020-10-05 RX ORDER — LISINOPRIL 20 MG/1
20 TABLET ORAL DAILY
Qty: 30 TABLET | Refills: 0 | Status: SHIPPED | OUTPATIENT
Start: 2020-10-05 | End: 2020-10-05 | Stop reason: SDUPTHER

## 2020-10-05 RX ORDER — ATORVASTATIN CALCIUM 40 MG/1
40 TABLET, FILM COATED ORAL DAILY
Qty: 30 TABLET | Refills: 0 | Status: SHIPPED | OUTPATIENT
Start: 2020-10-05

## 2020-10-05 RX ADMIN — SODIUM CHLORIDE, POTASSIUM CHLORIDE, SODIUM LACTATE AND CALCIUM CHLORIDE 1000 ML: 600; 310; 30; 20 INJECTION, SOLUTION INTRAVENOUS at 11:00

## 2020-10-05 RX ADMIN — SODIUM CHLORIDE 0.05 UNITS/KG/HR: 9 INJECTION, SOLUTION INTRAVENOUS at 08:00

## 2020-10-05 RX ADMIN — SODIUM CHLORIDE 0.04 UNITS/KG/HR: 9 INJECTION, SOLUTION INTRAVENOUS at 09:09

## 2020-10-05 RX ADMIN — SODIUM CHLORIDE 0.04 UNITS/KG/HR: 9 INJECTION, SOLUTION INTRAVENOUS at 10:05

## 2020-10-05 RX ADMIN — SODIUM CHLORIDE: 4.5 INJECTION, SOLUTION INTRAVENOUS at 02:53

## 2020-10-05 RX ADMIN — INSULIN GLARGINE 60 UNITS: 100 INJECTION, SOLUTION SUBCUTANEOUS at 22:32

## 2020-10-05 RX ADMIN — ENOXAPARIN SODIUM 40 MG: 40 INJECTION SUBCUTANEOUS at 08:15

## 2020-10-05 ASSESSMENT — PAIN SCALES - GENERAL
PAINLEVEL_OUTOF10: 0

## 2020-10-05 NOTE — FLOWSHEET NOTE
Spiritual Care Services     Summary of Visit:   provided spiritual care, empathic listening, ministry of presence, patient was very reflective on life and death, patient has had a lot of loss the last couple of years and feels at time alone, patient's sister was present during the visit, patient reflected back on his childhood, his current circumstances, patient stated that \"you have to live with the live you got not the one you want\", thankful for the care the nursing staff has been providing and repeated the care he is receiving numerous time     Spiritual Assessment/Intervention/Outcomes:    Encounter Summary  Services provided to[de-identified] (P) Patient  Referral/Consult From[de-identified] (P) Rounding  Support System: (P) Children, Family members  Continue Visiting: (P) No  Complexity of Encounter: (P) Moderate  Length of Encounter: (P) 45 minutes  Spiritual Assessment Completed: (P) Yes  Routine  Type: (P) Initial     Spiritual/Baptism  Type: (P) Spiritual struggle  Assessment: (P) Unresolved grief, Approachable, Tearful, Grieving  Intervention: (P) Active listening, Explored feelings, thoughts, concerns, Sustaining presence/ Ministry of presence, Discussed death, Discussed relationship with God, Discussed belief system/Adventist practices/erlin        Advance Directives (For Healthcare)  Pre-existing DNR Comfort Care/DNR Arrest/DNI Order: No  Healthcare Directive: No, patient does not have an advance directive for healthcare treatment  Information on Healthcare Directives Requested: No           Values / Beliefs  Do you have any ethnic, cultural, sacramental, or spiritual Adventist needs you would like us to be aware of while you are in the hospital?: No    Care Plan:        [id number] [address]   Electronically signed by Dominique Lee on 10/5/20 at 3:05 PM EDT     To reach a  for emotional and spiritual support, place an [facility] consult request.   If a  is needed immediately, dial 0 and ask to page the on-call .

## 2020-10-05 NOTE — CARE COORDINATION
Met with patient at the bedside to discuss transition of care. Most importantly RX for insulin and other medications. Patient normally uses the [place] for all meds however, is able to use his commercial insurance to get the one month supply. He will follow up for the additional refills from the [place]. He is confident he can work it out as this is how he normally would handle any medication that is prescribed outside of the [place]. Patient lives alone but has support form his sister and help from others in the community. Case Management will follow as needed.  Electronically signed by Edgar Berry RN on 10/5/2020 at 3:14 PM

## 2020-10-05 NOTE — PROGRESS NOTES
1925-Bedside rounding completed with Alla CRANE. Pt is a+o x 4.  Pt denies pain of any kind and is resting comfortably in bed.  Pt instructed to call for assistance for further needs or if he wishes to get out of bed during the shift.  Bed alarm noted to be on for pt safety.  Pt noted to be NSR 80 on tele. Insulin drip running at 3 ml/hr into #20 Rt AC SL with no noted problems or pt complaints. Pt noted to be NPO at this time. Call bell and items are with in reach of pt.  Will continue to monitor.     1930-No PM medication scheduled for this time. BGL noted to be 422 at this time. Assessment completed, see Epic charting.  Pt instructed to call for assistance for further needs or if he wishes to get out of bed during the shift.  Bed alarm noted to be on for pt safety.  Call bell and items are with in reach of pt.  Will continue to monitor. 2030-BGL noted to be 408.    2130-BGL noted to be 375.    2230-BGL noted to be 367.    2330-BGL noted to be 348.    0030-BGL noted to be 334.    0130-BGL noted to be 286. 0230-BGL noted to be 277.     0330-BGL noted to be 264.    0430-BGL noted to be 285.      0530-BGL noted to be 278.    0630-BGL noted to be 261.

## 2020-10-05 NOTE — PLAN OF CARE
Problem: Falls - Risk of:  Goal: Will remain free from falls  Description: Will remain free from falls  Outcome: Ongoing  Goal: Absence of physical injury  Description: Absence of physical injury  Outcome: Ongoing     Problem: Discharge Planning:  Goal: Discharged to appropriate level of care  Description: Discharged to appropriate level of care  Outcome: Ongoing     Problem: Serum Glucose Level - Abnormal:  Goal: Ability to maintain appropriate glucose levels will improve  Description: Ability to maintain appropriate glucose levels will improve  Outcome: Ongoing     Problem: Sensory Perception - Impaired:  Goal: Ability to maintain a stable neurologic state will improve  Description: Ability to maintain a stable neurologic state will improve  Outcome: Ongoing     Problem: Mobility - Impaired:  Goal: Mobility will improve  Description: Mobility will improve  Outcome: Ongoing

## 2020-10-05 NOTE — DISCHARGE SUMMARY
[facility] Medicine Discharge Summary    Betty العراقي  :  1945  MRN:  [id number]    Admit date:  10/4/2020  Discharge date:  10/6/2020    Admitting Physician:  Domingo Arndt DO  Primary Care Physician:  Girma Anthony DO    Discharge Diagnoses:    Patient Active Problem List   Diagnosis    Type 2 diabetes mellitus without complications ([place] [address]    [facility]osmolar hyperglycemic state (HHS) ([facility] [address]    DKA (diabetic ketoacidosis) ([facility] [address]    Type 2 diabetes mellitus with hyperglycemia ([facility] [address]    SALOME (acute kidney [facility]) [facility])       Chief Complaint   Patient presents with    Fatigue     increased confusion over the last 5 days     Dehydration     cant't get enough  water        Condition: improved   Activity: no restrictions  Diet: diabetic  Disposition: home  Functional Status: ambulatory    Significant Findings:     Labs Renal Latest Ref Rng & Units 10/5/2020 10/5/2020 10/5/2020 10/5/2020 10/4/2020   BUN 8 - 23 mg/dL 32(H) 31(H) 33(H) 35(H) 37(H)   Cr 0.70 - 1.20 mg/dL 1.46(H) 1.50(H) 1.61(H) 1.74(H) 1.81(H)   K 3.4 - 4.9 mEq/L 3.9 3.6 4.0 4.5 4.5   Na 135 - 144 mEq/L 142 143 147(H) 146(H) 147(H)     a1c 13.1    Hospital Course:   57-year-old male with obesity, type 2 diabetes presented with 5-day history of increased confusion, polydipsia, and polyuria. He was found to have glucose of 987 and diagnosed with diabetic ketoacidosis as he had anion gap to 25, elevated beta hydroxybutyrate, and ketones in the urine. He also had mild acute kidney injury. He improved with aggressive IV fluids and insulin infusion. He was seen by endocrinology who prescribed his insulin regimen at discharge. Prior to admission, he had not been on insulin but had been on insulin remotely and was familiar with the administration. Once cleared by endocrinology, he will be discharged. Because he is a patient of the [place], he was given paper scripts to  his supplies.   Endocrinology was handling all of

## 2020-10-06 VITALS
HEIGHT: 69 IN | WEIGHT: 231.2 LBS | HEART RATE: 76 BPM | OXYGEN SATURATION: 96 % | RESPIRATION RATE: 18 BRPM | BODY MASS INDEX: 34.24 KG/M2 | SYSTOLIC BLOOD PRESSURE: 135 MMHG | DIASTOLIC BLOOD PRESSURE: 76 MMHG | TEMPERATURE: 97.5 F

## 2020-10-06 LAB
ANION GAP SERPL CALCULATED.3IONS-SCNC: 10 MEQ/L (ref 9–15)
BASOPHILS ABSOLUTE: 0.1 K/UL (ref 0–0.2)
BASOPHILS RELATIVE PERCENT: 0.9 %
BUN BLDV-MCNC: 28 MG/DL (ref 8–23)
CALCIUM SERPL-MCNC: 8.6 MG/DL (ref 8.5–9.9)
CHLORIDE BLD-SCNC: 104 MEQ/L (ref 95–107)
CO2: 27 MEQ/L (ref 20–31)
CREAT SERPL-MCNC: 1.45 MG/DL (ref 0.7–1.2)
EOSINOPHILS ABSOLUTE: 0.1 K/UL (ref 0–0.7)
EOSINOPHILS RELATIVE PERCENT: 2 %
GFR AFRICAN AMERICAN: 57.1
GFR NON-AFRICAN AMERICAN: 47.2
GLUCOSE BLD-MCNC: 312 MG/DL (ref 70–99)
GLUCOSE BLD-MCNC: 318 MG/DL (ref 60–115)
GLUCOSE BLD-MCNC: 318 MG/DL (ref 60–115)
HCT VFR BLD CALC: 44.9 % (ref 42–52)
HEMOGLOBIN: 14.9 G/DL (ref 14–18)
LYMPHOCYTES ABSOLUTE: 2 K/UL (ref 1–4.8)
LYMPHOCYTES RELATIVE PERCENT: 30.4 %
MAGNESIUM: 2 MG/DL (ref 1.7–2.4)
MCH RBC QN AUTO: 32.3 PG (ref 27–31.3)
MCHC RBC AUTO-ENTMCNC: 33.2 % (ref 33–37)
MCV RBC AUTO: 97.2 FL (ref 80–100)
MONOCYTES ABSOLUTE: 0.5 K/UL (ref 0.2–0.8)
MONOCYTES RELATIVE PERCENT: 7.1 %
NEUTROPHILS ABSOLUTE: 3.8 K/UL (ref 1.4–6.5)
NEUTROPHILS RELATIVE PERCENT: 59.6 %
PDW BLD-RTO: 13.7 % (ref 11.5–14.5)
PERFORMED ON: ABNORMAL
PERFORMED ON: ABNORMAL
PHOSPHORUS: 2.1 MG/DL (ref 2.3–4.8)
PHOSPHORUS: 2.2 MG/DL (ref 2.3–4.8)
PHOSPHORUS: 2.7 MG/DL (ref 2.3–4.8)
PLATELET # BLD: 115 K/UL (ref 130–400)
POTASSIUM SERPL-SCNC: 4 MEQ/L (ref 3.4–4.9)
RBC # BLD: 4.62 M/UL (ref 4.7–6.1)
SODIUM BLD-SCNC: 141 MEQ/L (ref 135–144)
WBC # BLD: 6.4 K/UL (ref 4.8–10.8)

## 2020-10-06 PROCEDURE — 80048 BASIC METABOLIC PNL TOTAL CA: CPT

## 2020-10-06 PROCEDURE — 84100 ASSAY OF PHOSPHORUS: CPT

## 2020-10-06 PROCEDURE — 6360000002 HC RX W HCPCS: Performed by: INTERNAL MEDICINE

## 2020-10-06 PROCEDURE — 99232 SBSQ HOSP IP/OBS MODERATE 35: CPT | Performed by: INTERNAL MEDICINE

## 2020-10-06 PROCEDURE — 96361 HYDRATE IV INFUSION ADD-ON: CPT

## 2020-10-06 PROCEDURE — 2580000003 HC RX 258: Performed by: INTERNAL MEDICINE

## 2020-10-06 PROCEDURE — 96372 THER/PROPH/DIAG INJ SC/IM: CPT

## 2020-10-06 PROCEDURE — 36415 COLL VENOUS BLD VENIPUNCTURE: CPT

## 2020-10-06 PROCEDURE — 83735 ASSAY OF MAGNESIUM: CPT

## 2020-10-06 PROCEDURE — G0378 HOSPITAL OBSERVATION PER HR: HCPCS

## 2020-10-06 PROCEDURE — 85025 COMPLETE CBC W/AUTO DIFF WBC: CPT

## 2020-10-06 RX ORDER — INSULIN LISPRO 100 [IU]/ML
INJECTION, SOLUTION INTRAVENOUS; SUBCUTANEOUS
Qty: 15 PEN | Refills: 3 | Status: SHIPPED | OUTPATIENT
Start: 2020-10-06 | End: 2020-10-14 | Stop reason: SDUPTHER

## 2020-10-06 RX ORDER — BLOOD-GLUCOSE METER
EACH MISCELLANEOUS
Qty: 1 KIT | Refills: 0 | Status: SHIPPED | OUTPATIENT
Start: 2020-10-06

## 2020-10-06 RX ORDER — INSULIN GLARGINE 100 [IU]/ML
INJECTION, SOLUTION SUBCUTANEOUS
Qty: 15 PEN | Refills: 3 | Status: SHIPPED | OUTPATIENT
Start: 2020-10-06

## 2020-10-06 RX ORDER — BLOOD SUGAR DIAGNOSTIC
STRIP MISCELLANEOUS
Qty: 200 EACH | Refills: 3 | Status: SHIPPED | OUTPATIENT
Start: 2020-10-06

## 2020-10-06 RX ORDER — INSULIN GLARGINE 100 [IU]/ML
80 INJECTION, SOLUTION SUBCUTANEOUS NIGHTLY
Status: DISCONTINUED | OUTPATIENT
Start: 2020-10-06 | End: 2020-10-06 | Stop reason: HOSPADM

## 2020-10-06 RX ORDER — INSULIN LISPRO 100 [IU]/ML
INJECTION, SOLUTION INTRAVENOUS; SUBCUTANEOUS
Qty: 15 PEN | Refills: 3 | Status: SHIPPED | OUTPATIENT
Start: 2020-10-06

## 2020-10-06 RX ORDER — LANCETS 33 GAUGE
EACH MISCELLANEOUS
Qty: 200 EACH | Refills: 3 | Status: SHIPPED | OUTPATIENT
Start: 2020-10-06

## 2020-10-06 RX ORDER — INSULIN GLARGINE 100 [IU]/ML
INJECTION, SOLUTION SUBCUTANEOUS
Qty: 15 PEN | Refills: 3 | Status: SHIPPED | OUTPATIENT
Start: 2020-10-06 | End: 2020-10-14 | Stop reason: SDUPTHER

## 2020-10-06 RX ORDER — SODIUM CHLORIDE, SODIUM LACTATE, POTASSIUM CHLORIDE, AND CALCIUM CHLORIDE .6; .31; .03; .02 G/100ML; G/100ML; G/100ML; G/100ML
1000 INJECTION, SOLUTION INTRAVENOUS ONCE
Status: COMPLETED | OUTPATIENT
Start: 2020-10-06 | End: 2020-10-06

## 2020-10-06 RX ADMIN — SODIUM CHLORIDE, POTASSIUM CHLORIDE, SODIUM LACTATE AND CALCIUM CHLORIDE 1000 ML: 600; 310; 30; 20 INJECTION, SOLUTION INTRAVENOUS at 12:16

## 2020-10-06 RX ADMIN — ENOXAPARIN SODIUM 40 MG: 40 INJECTION SUBCUTANEOUS at 09:47

## 2020-10-06 ASSESSMENT — PAIN SCALES - GENERAL
PAINLEVEL_OUTOF10: 0

## 2020-10-06 NOTE — PROGRESS NOTES
Diabetes educator not available today. Provide patient with survival packet education on the floor and enter an order for outpatient education if needed or appropriate after discharge.      Ildefonso Mccauley RN

## 2020-10-06 NOTE — CONSULTS
[address]yn [address]                      [address], [address] [facility]                                  CONSULTATION    PATIENT NAME: Nati Han                       :        1945  MED REC NO:   [id number]                            ROOM:       [id number]  ACCOUNT NO:   [de-identified]                           ADMIT DATE: 10/04/2020  PROVIDER:     Jones Oh MD    CONSULT DATE:  10/06/2020    ENDOCRINE CONSULT    REFERRING provider:  Xi Bell DO    REASON FOR CONSULT:  Management of uncontrolled diabetes and insulin  drip management. CHIEF COMPLAINT AND HISTORY OF PRESENT ILLNESS:  Mr. Analia York is a  57-year-old male with known history of diabetes over 20 years, admitted  with fatigue, dehydration and hyperglycemia. He has been increasingly  confused over the last 4-5 days. Blood sugar was 987 in the ER, also  had increased lactate, did have acute kidney insufficiency. The patient  not been eating right and has been eating increase carbs and baked  foods. His A1c was elevated at 13. The patient has been mostly on oral  medications before for his diabetes. A1c was 13.1. Home medications for diabetes included metformin. He has taken insulin  in the past, mostly gets his care through [place]. Complication of diabetes  include diabetic neuropathy. Reviewed labs. The patient today was insulin at 4 units per hour. Labs  were reviewed from this afternoon. Sodium 142, potassium 3.9, chloride  104, CO2 was 25, BUN 32, creatinine 1.46. Baseline labs were reviewed. Sodium 137, potassium 5.9, chloride 90, CO2 was 22, BUN 36, creatinine  1.96, glucose was 987. Troponin I was 0.061. Beta-hydroxybutyric acid  was 45.6. PAST MEDICAL HISTORY:  Significant for type 2 diabetes. PAST SURGICAL HISTORY:  Review, noncontributory. FAMILY HISTORY:  Reviewed, noncontributory. PERSONAL AND SOCIAL HISTORY:  Denies any smoking. Denies any alcohol.     HOME MEDICATIONS: Metformin. CURRENT MEDICATIONS:  Here include insulin drip, Lovenox, normal saline  at 200 mL an hour. ALLERGIES:  None. REVIEW OF SYSTEMS:  Other than changes in mental status, hyperglycemia,  and weakness, 14-point review of system was negative. PHYSICAL EXAMINATION:  GENERAL:  The patient is alert, awake, oriented x3, in no obvious  distress. VITAL SIGNS:  Blood pressure was 140/72, pulse rate was 91, respiratory  rate 18, temperature 97.9. HEENT:  Normocephalic, atraumatic. Pupils equal and reacting to light. Oral mucosa was dry. NECK:  Supple. Trachea in the midline. CHEST:  Lungs clear to auscultation bilaterally. No wheezing or  crackle. CARDIOVASCULAR:  Heart sounds were normal.  Murmurs or thrills were  present. ABDOMEN:  Soft, significantly obese. Bowel sounds were present. No  organomegaly or tenderness. EXTREMITIES:  Lower extremities reveal no edema. SKIN:  Intact. MUSCULOSKELETAL:  No joint swelling. PSYCHIATRIC:  Normal affect, cognition. NEUROLOGIC:  Showed cranial I through XII were intact. LABORATORY DATA:  Labs as above. ASSESSMENT:  Hyperosmolar state, uncontrolled diabetes, acute kidney  injury, dehydration, and poor compliance. PLAN:  Discontinue insulin drip. Start the patient on Lantus 60 at  night, Humalog 15 with each meals. If stable, could be discharged on  Lantus plus Humalog. The patient to follow up in the office in couple  of weeks. We will discontinue metformin at the time of discharge. Advised also compliance with diet, lower carb, and sweet intake;  verbalize understanding. The patient to follow up in the office after  his discharge. Long-term A1c goal of 7 or lower. Thank you for the consult.         Chetna Luna MD    D: 10/05/2020 17:04:19       T: 10/05/2020 17:07:47     [email or web address]  Job#: [id number]     Doc#: [id number]    CC:

## 2020-10-06 NOTE — PROGRESS NOTES
Physician Progress Note      PATIENT:               Almaz Null  CSN #:                  [id number]  :                       1945  ADMIT DATE:       10/4/2020 12:27 PM  [address] [place] DATE:  RESPONDING  PROVIDER #:        Abiola Ulrich DO          QUERY TEXT:    Dear attending. Patient with h/o DMT2 admitted with confusion, polydipsia, polyuria, and BG   987. Noted documentation of HHS per H&P and endocrinology consult and DKA per   d/c summary. If, possible, please document in PNs and d/c summary if you are   evaluating and /or treating any of the following: The medical record reflects the following:  Risk Factors: Diabetes type 2, noncompliance  Clinical Indicators: pH 7.366 HCO3 23.9  BHB 45.9  urine ketones 15  CO2   22  AG 25 lactic aid 4.4/4.3  D/c summary-He was found to have glucose of 987 and diagnosed with diabetic   ketoacidosis as he had anion gap to 25, elevated beta hydroxybutyrate, and   ketones in the urine  10/5 Dr. Guerline Ramos state, uncontrolled diabetes  H&P-HHS-Due to not taking his diabetic medications  Treatment: insulin infusion, IVF, POC BG with coverage, labs, endocrinology   consult    Thank you, Vega Olivarez RN BSN CDS  [phone]  Options provided:  -- DKA confirmed and HHS ruled out  -- HHS confirmed and DKA ruled out  -- Other - I will add my own diagnosis  -- Disagree - Not applicable / Not valid  -- Disagree - Clinically unable to determine / Unknown  -- Refer to Clinical Documentation Reviewer    PROVIDER RESPONSE TEXT:    After study, DKA confirmed and HHS ruled out.     Query created by: Tomasa Sierra on 10/6/2020 11:06 AM      Electronically signed by:  Abiola Ulrich DO 10/6/2020 1:45 PM

## 2020-10-06 NOTE — PROGRESS NOTES
Nutrition Education    Pt and sister state that pt likes to bake and eats sweets fairly often as well as drinks sweetened beverages often. Pt often skips meals/ not familiar with carbohydrate foods/eats 'alot of fruit'. · Verbally reviewed information with Patient/Sister. · Educated on [address] [facility] Diabetic Dietary Guidelines. · Written educational materials provided. · Contact name and number provided. · Refer to Patient Education activity for more details.     Electronically signed by Melissa Stewart RD, LD on 10/6/20 at 2:22 PM EDT

## 2020-10-06 NOTE — PROGRESS NOTES
Physician Progress Note    10/6/2020   3:14 PM    Name:  Cece Gallo  MRN:    [id number]     IP Day: 2     Admit Date: 10/4/2020 12:27 PM  PCP: Talib Zarate DO    Code Status:  Full Code    Subjective:     Some thirst but otherwise well.      Current Facility-Administered Medications   Medication Dose Route Frequency Provider Last Rate Last Dose    insulin glargine (LANTUS) injection vial 80 Units  80 Units Subcutaneous Nightly Leif Lam MD        insulin lispro (HUMALOG) injection vial 20 Units  20 Units Subcutaneous TID  Kimberly Rubin MD   20 Units at 10/06/20 1335    insulin lispro (HUMALOG) injection vial 0-18 Units  0-18 Units Subcutaneous TID  Leif Lam MD   12 Units at 10/06/20 1334    insulin lispro (HUMALOG) injection vial 0-9 Units  0-9 Units Subcutaneous Nightly Lefi Lam MD   6 Units at 10/05/20 2233    enoxaparin (LOVENOX) injection 40 mg  40 mg Subcutaneous Daily [facility], DO   40 mg at 10/06/20 0947    dextrose 50 % IV solution  12.5 g Intravenous PRN [facility], DO        potassium chloride 10 mEq/100 mL IVPB (Peripheral Line)  10 mEq Intravenous PRN [facility], DO        magnesium sulfate 1 g in dextrose 5% 100 mL IVPB  1 g Intravenous PRN [facility], DO        sodium phosphate 10 mmol in dextrose 5 % 250 mL IVPB  10 mmol Intravenous PRN [facility], DO        Or    sodium phosphate 15 mmol in dextrose 5 % 250 mL IVPB  15 mmol Intravenous PRN [facility], DO        Or    sodium phosphate 20 mmol in dextrose 5 % 500 mL IVPB  20 mmol Intravenous PRN [facility], DO        0.45 % sodium chloride infusion   Intravenous Continuous [facility],  mL/hr at 10/05/20 0253      dextrose 5 % and 0.45 % sodium chloride infusion   Intravenous Continuous PRN [facility], DO         Current Outpatient Medications   Medication Sig Dispense Refill    insulin glargine (LANTUS SOLOSTAR) 100 UNIT/ML injection pen 80 units at bedtime 15 pen 3    will prescribe homegoing medications. Home care ordered. 1L additional bolus per family request and d/c today.        Electronically signed by Lynda Albrecht DO on 10/6/2020 at 3:14 PM

## 2020-10-06 NOTE — PROGRESS NOTES
Pt alert and oriented X4, calm and cooperative. PERRLA, neuro assessment unremarkable. Pt denies chest pain,SOB, headache, blurred vision, nausea or vomiting. Lung sounds clear, heart sounds normal, bowel sounds active in all four quadrants. Abdomen is soft and non tender. Pedal pulses +1 BLE, no edema noted. Pt has a steady gait, independent in room.  Call light in reach, will continue to monitor Electronically signed by Nagi Sinclair RN on 10/6/2020 at 2:43 AM

## 2020-10-06 NOTE — PROGRESS NOTES
Progress Note  Date:10/6/2020       [facility]:[id number]  Patient Savannah Ghotra     YOB: 1945     Age:74 y.o. Chief complaint uncontrolled diabetes  Subjective    Subjective:  Symptoms:  Stable. Diet:  Adequate intake. Activity level: Returning to normal.       Review of Systems   All other systems reviewed and are negative. Objective         Vitals Last 24 Hours:  TEMPERATURE:  Temp  Av.8 °F (36.6 °C)  Min: 97.5 °F (36.4 °C)  Max: 98.1 °F (36.7 °C)  RESPIRATIONS RANGE: Resp  Av  Min: 18  Max: 18  PULSE OXIMETRY RANGE: SpO2  Av %  Min: 94 %  Max: 96 %  PULSE RANGE: Pulse  Av.5  Min: 76  Max: 93  BLOOD PRESSURE RANGE: Systolic (68OLO), FTQ:215 , Min:117 , WFY:428   ; Diastolic (10MEP), TPI:30, Min:76, Max:76    I/O (24Hr): Intake/Output Summary (Last 24 hours) at 10/6/2020 1322  Last data filed at 10/6/2020 0718  Gross per 24 hour   Intake --   Output 475 ml   Net -475 ml     Objective:  General Appearance:  Comfortable. Vital signs: (most recent): Blood pressure 135/76, pulse 76, temperature 97.5 °F (36.4 °C), temperature source Oral, resp. rate 18, height 5' 8.5\" (1.74 m), weight 231 lb 3.2 oz (104.9 kg), SpO2 96 %. Vital signs are normal.    HEENT: Normal HEENT exam.    Lungs:  Normal effort. Heart: Normal rate. Abdomen: Abdomen is soft. Extremities: Normal range of motion. Neurological: Patient is alert.       Labs/Imaging/Diagnostics    Labs:  CBC:  Recent Labs     10/04/20  1300 10/04/20  1345 10/05/20  0311 10/06/20  0525   WBC 8.9  --  10.5 6.4   RBC 5.60  --  5.11 4.62*   HGB 17.7 19.0* 16.6 14.9   HCT 57.3*  --  49.4 44.9   .4*  --  96.8 97.2   RDW 14.9*  --  13.8 13.7     --  140 115*     CHEMISTRIES:  Recent Labs     10/05/20  1425 10/05/20  1805 10/06/20  0525 10/06/20  1048    138 141  --    K 3.9 4.4 4.0  --     100 104  --    CO2 25 25 27  --    BUN 32* 32* 28*  --    CREATININE 1.46* 1.48* 1.45*  -- GLUCOSE 251* 423* 312*  --    PHOS 2.7 3.0 2.7 2.1*   MG 2.0 1.9 2.0  --      PT/INR:  Recent Labs     10/04/20  1300   PROTIME 15.5*   INR 1.2     APTT:No results for input(s): APTT in the last 72 hours. LIVER PROFILE:  Recent Labs     10/04/20  1300   AST 38   ALT 55*   BILITOT 0.8*   ALKPHOS 103       Results for Nohemy Chapin (MRN [id number]) as of 10/6/2020 22:16   Ref. Range 10/6/2020 05:25 10/6/2020 08:18 10/6/2020 10:48 10/6/2020 11:27   Sodium Latest Ref Range: 135 - 144 mEq/L 141      Potassium Latest Ref Range: 3.4 - 4.9 mEq/L 4.0      Chloride Latest Ref Range: 95 - 107 mEq/L 104      CO2 Latest Ref Range: 20 - 31 mEq/L 27      BUN Latest Ref Range: 8 - 23 mg/dL 28 (H)      Creatinine Latest Ref Range: 0.70 - 1.20 mg/dL 1.45 (H)      Anion Gap Latest Ref Range: 9 - 15 mEq/L 10      GFR Non- Latest Ref Range: >60  47.2 (L)      GFR  Latest Ref Range: >60  57.1 (L)      Magnesium Latest Ref Range: 1.7 - 2.4 mg/dL 2.0      Glucose Latest Ref Range: 70 - 99 mg/dL 312 (H)      POC Glucose Latest Ref Range: 60 - 115 mg/dl  318 (H)  318 (H)   Calcium Latest Ref Range: 8.5 - 9.9 mg/dL 8.6      Phosphorus Latest Ref Range: 2.3 - 4.8 mg/dL 2.7  2.1 (L)          Imaging Last 24 Hours:  Xr Chest Portable    Result Date: 10/4/2020  XR CHEST PORTABLE Clinical History:   Cough   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 Comparison:  None  RESULT: Lungs and pleura:  No consolidation. No pleural effusion. No pneumothorax. Normal pulmonary vascular pattern. Cardiomediastinal silhouette:  Normal. Other:  No acute osseous findings. Remote rib fractures. No acute radiographic abnormality.      Assessment//Plan           Hospital Problems           Last Modified POA    * (Principal) DKA (diabetic ketoacidosis) ([facility] 10/5/2020 Yes    Hyperosmolar non-ketotic state due to type 2 diabetes mellitus ([facility] 10/5/2020 Yes    Type 2 diabetes mellitus with hyperglycemia ([facility] 10/5/2020 Yes    SALOME (acute kidney injury) ([facility] 10/5/2020 Yes              Assessment:    Condition: In stable condition. Improving. (Uncontrolled type 2 diabetes status post hyperosmolar state  Patient transition to subcutaneous insulin after insulin drip  Chronic kidney disease  Poor compliance). Plan:   Discharge home. (Discharge patient on Lantus 80 units at bedtime plus Humalog 20 units with each meals  Patient had diabetes education and dietary evaluation  Test blood sugar 4 times daily  Follow-up in the office in 2 weeks time).        Electronically signed by Hui Alan MD on 10/6/20 at 1:22 PM EDT

## 2020-10-07 NOTE — PROGRESS NOTES
CLINICAL PHARMACY NOTE: MEDS TO [address] [place] Patient?: No  Total # of Prescriptions Filled: 1   The following medications were delivered to the patient:  · Atorvastatin 40mg   Total # of Interventions Completed: 0  Time Spent (min): 30    Additional Documentation:  Patient to  in pharmacy

## 2020-10-09 LAB
BLOOD CULTURE, ROUTINE: NORMAL
CULTURE, BLOOD 2: NORMAL

## 2020-10-14 ENCOUNTER — OFFICE VISIT (OUTPATIENT)
Dept: ENDOCRINOLOGY | Age: 75
End: 2020-10-14
Payer: MEDICARE

## 2020-10-14 VITALS
BODY MASS INDEX: 36.07 KG/M2 | HEIGHT: 68 IN | WEIGHT: 238 LBS | HEART RATE: 92 BPM | OXYGEN SATURATION: 96 % | SYSTOLIC BLOOD PRESSURE: 140 MMHG | DIASTOLIC BLOOD PRESSURE: 97 MMHG

## 2020-10-14 LAB
CHP ED QC CHECK: NORMAL
GLUCOSE BLD-MCNC: 344 MG/DL
HBA1C MFR BLD: 14 %

## 2020-10-14 PROCEDURE — 83036 HEMOGLOBIN GLYCOSYLATED A1C: CPT | Performed by: INTERNAL MEDICINE

## 2020-10-14 PROCEDURE — 99214 OFFICE O/P EST MOD 30 MIN: CPT | Performed by: INTERNAL MEDICINE

## 2020-10-14 PROCEDURE — 95250 CONT GLUC MNTR PHYS/QHP EQP: CPT | Performed by: INTERNAL MEDICINE

## 2020-10-14 RX ORDER — INSULIN GLARGINE 100 [IU]/ML
INJECTION, SOLUTION SUBCUTANEOUS
Qty: 15 PEN | Refills: 3
Start: 2020-10-14

## 2020-10-14 RX ORDER — INSULIN LISPRO 100 [IU]/ML
INJECTION, SOLUTION INTRAVENOUS; SUBCUTANEOUS
Qty: 15 PEN | Refills: 3
Start: 2020-10-14

## 2020-10-14 NOTE — PROGRESS NOTES
Subjective:      Patient ID: Brody Islas is a 66 y.o. male. Follow-up on recent hospital admission for uncontrolled diabetes patient was discharged on Lantus 80 units at bedtime Humalog 20 with each meals but apparently still only taking 8 units of Lantus in June units of Humalog reviewed the hospital consult  Diabetes   He presents for his follow-up diabetic visit. He has type 2 diabetes mellitus. His disease course has been fluctuating. There are no hypoglycemic associated symptoms. Associated symptoms include polydipsia and polyuria. Pertinent negatives for diabetes include no visual change and no weight loss. Current diabetic treatment includes insulin injections. He is currently taking insulin pre-breakfast, pre-lunch, pre-dinner and at bedtime. His overall blood glucose range is >200 mg/dl. An ACE inhibitor/angiotensin II receptor blocker is being taken. Lab Results   Component Value Date     10/06/2020    K 4.0 10/06/2020     10/06/2020    CO2 27 10/06/2020    BUN 28 (H) 10/06/2020    CREATININE 1.45 (H) 10/06/2020    GLUCOSE 344 10/14/2020    CALCIUM 8.6 10/06/2020    PROT 8.5 (H) 10/04/2020    LABALBU 4.0 10/04/2020    BILITOT 0.8 (H) 10/04/2020    ALKPHOS 103 10/04/2020    AST 38 10/04/2020    ALT 55 (H) 10/04/2020    LABGLOM 47.2 (L) 10/06/2020    GFRAA 57.1 (L) 10/06/2020    GLOB 4.5 (H) 10/04/2020       Lab Results   Component Value Date    LABA1C 14.0 10/14/2020         Patient Active Problem List   Diagnosis    Type 2 diabetes mellitus without complications (HCC)    Hyperosmolar non-ketotic state due to type 2 diabetes mellitus ([facility]    DKA (diabetic ketoacidosis) (HCC)    Type 2 diabetes mellitus with hyperglycemia ([facility]    SALOME (acute kidney injury) ([facility]     Social History     Socioeconomic History    Marital status:       Spouse name: Not on file    Number of children: Not on file    Years of education: Not on file    Highest education level: Not on file Occupational History    Not on file   Social Needs    Financial resource strain: Not on file    Food insecurity     Worry: Not on file     Inability: Not on file    Transportation needs     Medical: Not on file     Non-medical: Not on file   Tobacco Use    Smoking status: Never Smoker    Smokeless tobacco: Never Used   Substance and Sexual Activity    Alcohol use: Never     Frequency: Never    Drug use: Never    Sexual activity: Not on file   Lifestyle    Physical activity     Days per week: Not on file     Minutes per session: Not on file    Stress: Not on file   Relationships    Social connections     Talks on phone: Not on file     Gets together: Not on file     Attends Voodoo service: Not on file     Active member of club or organization: Not on file     Attends meetings of clubs or organizations: Not on file     Relationship status: Not on file    Intimate partner violence     Fear of current or ex partner: Not on file     Emotionally abused: Not on file     Physically abused: Not on file     Forced sexual activity: Not on file   Other Topics Concern    Not on file   Social History Narrative    Not on file         No Known Allergies      Current Outpatient Medications:     insulin glargine (LANTUS SOLOSTAR) 100 UNIT/ML injection pen, 50 units at bedtime, Disp: 15 pen, Rfl: 3    insulin lispro, 1 Unit Dial, (HUMALOG KWIKPEN) 100 UNIT/ML SOPN, 16 units with each meals, Disp: 15 pen, Rfl: 3    Insulin Pen Needle (NOVOFINE) 32G X 6 MM MISC, qid, Disp: 300 each, Rfl: 3    OneTouch Delica Lancets 49F MISC, Qid on insulin, Disp: 200 each, Rfl: 3    blood glucose test strips (ONETOUCH VERIO) strip, Qid on insulin e 11.65, Disp: 200 each, Rfl: 3    Blood Glucose Monitoring Suppl Tommy Lobato) w/Device KIT, As directed, Disp: 1 kit, Rfl: 0    insulin glargine (LANTUS SOLOSTAR) 100 UNIT/ML injection pen, 80 units at bedtime, Disp: 15 pen, Rfl: 3    insulin lispro, 1 Unit Dial, (HUMALOG KWIKPEN) 100 UNIT/ML SOPN, 20 units at each meals, Disp: 15 pen, Rfl: 3    Insulin Pen Needle (NOVOFINE) 32G X 6 MM MISC, qid, Disp: 300 each, Rfl: 3    atorvastatin (LIPITOR) 40 MG tablet, Take 1 tablet by mouth daily, Disp: 30 tablet, Rfl: 0    lisinopril (PRINIVIL;ZESTRIL) 20 MG tablet, Take 1 tablet by mouth daily, Disp: 30 tablet, Rfl: 0    Review of Systems   Constitutional: Negative for weight loss. Endocrine: Positive for polydipsia and polyuria. Vitals:    10/14/20 1358 10/14/20 1401   BP: (!) 150/112 (!) 140/97   Pulse: 92    SpO2: 96%    Weight: 238 lb (108 kg)    Height: 5' 8\" (1.727 m)        Objective:   Physical Exam  Constitutional:       Appearance: Normal appearance. He is obese. HENT:      Head: Normocephalic and atraumatic. Neck:      Musculoskeletal: Normal range of motion and neck supple. Cardiovascular:      Rate and Rhythm: Normal rate. Musculoskeletal: Normal range of motion. Neurological:      General: No focal deficit present. Mental Status: He is alert. Assessment:       Diagnosis Orders   1.  Type 2 diabetes mellitus with other specified complication, with long-term current use of insulin ([facility])  POCT Glucose    POCT glycosylated hemoglobin (Hb A1C)           Plan:      Orders Placed This Encounter   Procedures    POCT Glucose    POCT glycosylated hemoglobin (Hb A1C)    MT CONT GLUC MNTR PHYSICIAN/QHP PROVIDED EQUIPTMENT     Orders Placed This Encounter   Medications    insulin glargine (LANTUS SOLOSTAR) 100 UNIT/ML injection pen     Si units at bedtime     Dispense:  15 pen     Refill:  3    insulin lispro, 1 Unit Dial, (HUMALOG KWIKPEN) 100 UNIT/ML SOPN     Si units with each meals     Dispense:  15 pen     Refill:  3     More than 50% of 25 minutes spent in patient education counseling discussed fasting blood sugar goal of 100-1 25 postprandial glucose range 122 200    Diabetes education provided today:  Patient also planning to travel after a month [facility] patient educated about testing blood sugar having glucose tablets and carbs for hypoglycemia and keeping insulin cool and refrigerated  Patient verbalized understanding  Nutrition as a mainstream of diabetes therapy. [place] about label reading. Continuous Glucose monitor. How it works and checks blood sugars every 5 min. for 4 days during our tests. Managing high and low sugar readings.           Kimberly Rubin MD

## 2020-10-19 ASSESSMENT — ENCOUNTER SYMPTOMS: VISUAL CHANGE: 0

## 2020-10-23 ENCOUNTER — NURSE ONLY (OUTPATIENT)
Dept: ENDOCRINOLOGY | Age: 75
End: 2020-10-23
Payer: MEDICARE

## 2020-10-23 PROCEDURE — 95251 CONT GLUC MNTR ANALYSIS I&R: CPT | Performed by: INTERNAL MEDICINE

## 2020-12-08 ENCOUNTER — TELEPHONE (OUTPATIENT)
Dept: DIABETES SERVICES | Age: 75
End: 2020-12-08

## 2020-12-08 NOTE — PROGRESS NOTES
Patient states he is in [place] right now and doing well. States his BG is between . Never really higher than 140. He denies any needs and/or assistance. Encouraged to call the diabetes education department if any issues arise.      Sung Chacon RN

## 2022-06-09 ENCOUNTER — APPOINTMENT (OUTPATIENT)
Dept: CT IMAGING | Age: 77
End: 2022-06-09
Payer: MEDICARE

## 2022-06-09 ENCOUNTER — HOSPITAL ENCOUNTER (EMERGENCY)
Age: 77
Discharge: HOME | End: 2022-06-09
Attending: EMERGENCY MEDICINE
Payer: MEDICARE

## 2022-06-09 VITALS
DIASTOLIC BLOOD PRESSURE: 74 MMHG | BODY MASS INDEX: 37.89 KG/M2 | RESPIRATION RATE: 26 BRPM | SYSTOLIC BLOOD PRESSURE: 134 MMHG | WEIGHT: 250 LBS | HEART RATE: 84 BPM | HEIGHT: 68 IN | OXYGEN SATURATION: 95 %

## 2022-06-09 DIAGNOSIS — R10.11 RIGHT UPPER QUADRANT ABDOMINAL PAIN: Primary | ICD-10-CM

## 2022-06-09 DIAGNOSIS — R05.9 COUGH: ICD-10-CM

## 2022-06-09 DIAGNOSIS — K76.9 LIVER LESION: ICD-10-CM

## 2022-06-09 DIAGNOSIS — N28.9 KIDNEY LESION: ICD-10-CM

## 2022-06-09 LAB
ALBUMIN SERPL-MCNC: 3.6 G/DL (ref 3.5–4.6)
ALP BLD-CCNC: 129 U/L (ref 35–104)
ALT SERPL-CCNC: 51 U/L (ref 0–41)
ANION GAP SERPL CALCULATED.3IONS-SCNC: 13 MEQ/L (ref 9–15)
AST SERPL-CCNC: 50 U/L (ref 0–40)
BASOPHILS ABSOLUTE: 0.1 K/UL (ref 0–0.2)
BASOPHILS RELATIVE PERCENT: 0.6 %
BILIRUB SERPL-MCNC: 0.6 MG/DL (ref 0.2–0.7)
BUN BLDV-MCNC: 12 MG/DL (ref 8–23)
CALCIUM SERPL-MCNC: 9.1 MG/DL (ref 8.5–9.9)
CHLORIDE BLD-SCNC: 96 MEQ/L (ref 95–107)
CO2: 26 MEQ/L (ref 20–31)
CREAT SERPL-MCNC: 1.24 MG/DL (ref 0.7–1.2)
EOSINOPHILS ABSOLUTE: 0.3 K/UL (ref 0–0.7)
EOSINOPHILS RELATIVE PERCENT: 2.8 %
GFR AFRICAN AMERICAN: >60
GFR NON-AFRICAN AMERICAN: 56.3
GLOBULIN: 4.6 G/DL (ref 2.3–3.5)
GLUCOSE BLD-MCNC: 171 MG/DL (ref 70–99)
HCT VFR BLD CALC: 46.3 % (ref 42–52)
HEMOGLOBIN: 15.6 G/DL (ref 14–18)
LACTIC ACID: 2.5 MMOL/L (ref 0.5–2.2)
LIPASE: 30 U/L (ref 12–95)
LYMPHOCYTES ABSOLUTE: 1.9 K/UL (ref 1–4.8)
LYMPHOCYTES RELATIVE PERCENT: 19.4 %
MAGNESIUM: 2.2 MG/DL (ref 1.7–2.4)
MCH RBC QN AUTO: 31.9 PG (ref 27–31.3)
MCHC RBC AUTO-ENTMCNC: 33.7 % (ref 33–37)
MCV RBC AUTO: 94.7 FL (ref 80–100)
MONOCYTES ABSOLUTE: 0.9 K/UL (ref 0.2–0.8)
MONOCYTES RELATIVE PERCENT: 9.5 %
NEUTROPHILS ABSOLUTE: 6.6 K/UL (ref 1.4–6.5)
NEUTROPHILS RELATIVE PERCENT: 67.7 %
PDW BLD-RTO: 14.5 % (ref 11.5–14.5)
PLATELET # BLD: 232 K/UL (ref 130–400)
POTASSIUM SERPL-SCNC: 4.6 MEQ/L (ref 3.4–4.9)
PRO-BNP: 86 PG/ML
PROCALCITONIN: 0.22 NG/ML (ref 0–0.15)
RBC # BLD: 4.89 M/UL (ref 4.7–6.1)
SODIUM BLD-SCNC: 135 MEQ/L (ref 135–144)
TOTAL PROTEIN: 8.2 G/DL (ref 6.3–8)
TROPONIN: 0.05 NG/ML (ref 0–0.01)
WBC # BLD: 9.8 K/UL (ref 4.8–10.8)

## 2022-06-09 PROCEDURE — 83735 ASSAY OF MAGNESIUM: CPT

## 2022-06-09 PROCEDURE — 87040 BLOOD CULTURE FOR BACTERIA: CPT

## 2022-06-09 PROCEDURE — 6360000002 HC RX W HCPCS: Performed by: EMERGENCY MEDICINE

## 2022-06-09 PROCEDURE — 93005 ELECTROCARDIOGRAM TRACING: CPT | Performed by: EMERGENCY MEDICINE

## 2022-06-09 PROCEDURE — 96374 THER/PROPH/DIAG INJ IV PUSH: CPT

## 2022-06-09 PROCEDURE — 80053 COMPREHEN METABOLIC PANEL: CPT

## 2022-06-09 PROCEDURE — 99285 EMERGENCY DEPT VISIT HI MDM: CPT

## 2022-06-09 PROCEDURE — 96375 TX/PRO/DX INJ NEW DRUG ADDON: CPT

## 2022-06-09 PROCEDURE — 74177 CT ABD & PELVIS W/CONTRAST: CPT

## 2022-06-09 PROCEDURE — 84145 PROCALCITONIN (PCT): CPT

## 2022-06-09 PROCEDURE — 6360000004 HC RX CONTRAST MEDICATION: Performed by: EMERGENCY MEDICINE

## 2022-06-09 PROCEDURE — 83605 ASSAY OF LACTIC ACID: CPT

## 2022-06-09 PROCEDURE — 36415 COLL VENOUS BLD VENIPUNCTURE: CPT

## 2022-06-09 PROCEDURE — 83690 ASSAY OF LIPASE: CPT

## 2022-06-09 PROCEDURE — 83880 ASSAY OF NATRIURETIC PEPTIDE: CPT

## 2022-06-09 PROCEDURE — 84484 ASSAY OF TROPONIN QUANT: CPT

## 2022-06-09 PROCEDURE — 71275 CT ANGIOGRAPHY CHEST: CPT

## 2022-06-09 PROCEDURE — 85025 COMPLETE CBC W/AUTO DIFF WBC: CPT

## 2022-06-09 RX ORDER — HYDROCODONE BITARTRATE AND ACETAMINOPHEN 5; 325 MG/1; MG/1
1 TABLET ORAL EVERY 6 HOURS PRN
Qty: 16 TABLET | Refills: 0 | Status: SHIPPED | OUTPATIENT
Start: 2022-06-09 | End: 2022-06-14

## 2022-06-09 RX ORDER — ONDANSETRON 2 MG/ML
4 INJECTION INTRAMUSCULAR; INTRAVENOUS ONCE
Status: COMPLETED | OUTPATIENT
Start: 2022-06-09 | End: 2022-06-09

## 2022-06-09 RX ORDER — MORPHINE SULFATE 4 MG/ML
4 INJECTION, SOLUTION INTRAMUSCULAR; INTRAVENOUS
Status: DISCONTINUED | OUTPATIENT
Start: 2022-06-09 | End: 2022-06-10 | Stop reason: HOSPADM

## 2022-06-09 RX ADMIN — MORPHINE SULFATE 4 MG: 4 INJECTION, SOLUTION INTRAMUSCULAR; INTRAVENOUS at 20:07

## 2022-06-09 RX ADMIN — ONDANSETRON 4 MG: 2 INJECTION INTRAMUSCULAR; INTRAVENOUS at 20:06

## 2022-06-09 RX ADMIN — IOPAMIDOL 100 ML: 612 INJECTION, SOLUTION INTRAVENOUS at 20:41

## 2022-06-09 ASSESSMENT — ENCOUNTER SYMPTOMS
DIARRHEA: 0
SORE THROAT: 0
NAUSEA: 0
ABDOMINAL PAIN: 1
BACK PAIN: 0
COUGH: 0
VOMITING: 0
SHORTNESS OF BREATH: 1

## 2022-06-09 ASSESSMENT — PAIN DESCRIPTION - PAIN TYPE
TYPE: ACUTE PAIN
TYPE: ACUTE PAIN

## 2022-06-09 ASSESSMENT — PAIN - FUNCTIONAL ASSESSMENT
PAIN_FUNCTIONAL_ASSESSMENT: NONE - DENIES PAIN
PAIN_FUNCTIONAL_ASSESSMENT: ACTIVITIES ARE NOT PREVENTED
PAIN_FUNCTIONAL_ASSESSMENT: 0-10

## 2022-06-09 ASSESSMENT — PAIN DESCRIPTION - ONSET
ONSET: GRADUAL
ONSET: ON-GOING

## 2022-06-09 ASSESSMENT — PAIN DESCRIPTION - ORIENTATION
ORIENTATION: RIGHT
ORIENTATION: RIGHT

## 2022-06-09 ASSESSMENT — PAIN SCALES - GENERAL
PAINLEVEL_OUTOF10: 9
PAINLEVEL_OUTOF10: 9
PAINLEVEL_OUTOF10: 3

## 2022-06-09 ASSESSMENT — PAIN DESCRIPTION - DESCRIPTORS
DESCRIPTORS: SHARP
DESCRIPTORS: DISCOMFORT

## 2022-06-09 ASSESSMENT — PAIN DESCRIPTION - FREQUENCY
FREQUENCY: CONTINUOUS
FREQUENCY: CONTINUOUS

## 2022-06-09 ASSESSMENT — PAIN DESCRIPTION - LOCATION
LOCATION: ABDOMEN
LOCATION: CHEST

## 2022-06-09 NOTE — ED TRIAGE NOTES
Pt presents to ER via self through lobby, Conscious, AOx4, C/o chest/right side pain concomitant SOB. States 9/10, started 2 am this morning, was sleeping.

## 2022-06-09 NOTE — ED PROVIDER NOTES
[facility] ED  eMERGENCYdEPARTMENT eNCOUnter      Pt Name: Deysi Blakely  MRN: [id number]  [place] 1945  Date of evaluation: 6/9/2022  Suyapa Merritt MD    CHIEF COMPLAINT           HPI  Deysi Blakely is a 64 y.o. male per chart review has a h/o DM II presents to the ED with sob, abdominal pain. Pt notes gradual onset, moderate, constant, worsening sob since 3 am.  Pt also notes severe, constant, stabbing, RUQ, RLQ ab pain since 2 am.  Worse with deep inspiration. Pt denies fever, n/v, cp, sob, dysuria, diarrhea. ROS  Review of Systems   Constitutional: Negative for activity change, chills and fever. HENT: Negative for ear pain and sore throat. Eyes: Negative for visual disturbance. Respiratory: Positive for shortness of breath. Negative for cough. Cardiovascular: Negative for chest pain, palpitations and leg swelling. Gastrointestinal: Positive for abdominal pain. Negative for diarrhea, nausea and vomiting. Genitourinary: Negative for dysuria. Musculoskeletal: Negative for back pain. Skin: Negative for rash. Neurological: Negative for dizziness and weakness. Except as noted above the remainder of the review of systems was reviewed and negative. PAST MEDICAL HISTORY     Past Medical History:   Diagnosis Date    Diabetes mellitus ([address]          SURGICAL HISTORY     No past surgical history on file. [address]       Discharge Medication List as of 6/9/2022 11:04 PM      CONTINUE these medications which have NOT CHANGED    Details   !! insulin glargine (LANTUS SOLOSTAR) 100 UNIT/ML injection pen 50 units at bedtime, Disp-15 pen,R-3NO PRINT      !! insulin lispro, 1 Unit Dial, (HUMALOG KWIKPEN) 100 UNIT/ML SOPN 16 units with each meals, Disp-15 pen,R-3NO PRINT      !!  Insulin Pen Needle (NOVOFINE) 32G X 6 MM MISC Disp-300 each,R-3, Printqid      OneTouch Delica Lancets 36X MISC Qid on insulin, Disp-200 each,R-3Print      blood glucose test strips (ONETOUCH VERIO) strip Qid on insulin e 11.65, Disp-200 each,R-3Print      Blood Glucose Monitoring Suppl (Vidya Owen) w/Device KIT As directed, Disp-1 kit,R-0Print      !! insulin glargine (LANTUS SOLOSTAR) 100 UNIT/ML injection pen 80 units at bedtime, Disp-15 pen,R-3Print      !! insulin lispro, 1 Unit Dial, (HUMALOG KWIKPEN) 100 UNIT/ML SOPN 20 units at each meals, Disp-15 pen,R-3Print      !! Insulin Pen Needle (NOVOFINE) 32G X 6 MM MISC Disp-300 each,R-3, Printqid      atorvastatin (LIPITOR) 40 MG tablet Take 1 tablet by mouth daily, Disp-30 tablet,R-0Print      lisinopril (PRINIVIL;ZESTRIL) 20 MG tablet Take 1 tablet by mouth daily, Disp-30 tablet,R-0Print       !! - Potential duplicate medications found. Please discuss with provider. ALLERGIES     Patient has no known allergies. FAMILY HISTORY     No family history on file. SOCIAL HISTORY       Social History     Socioeconomic History    Marital status:      Spouse name: Not on file    Number of children: Not on file    Years of education: Not on file    Highest education level: Not on file   Occupational History    Not on file   Tobacco Use    Smoking status: Never Smoker    Smokeless tobacco: Never Used   Vaping Use    Vaping Use: Never used   Substance and Sexual Activity    Alcohol use: Never    Drug use: Never    Sexual activity: Not on file   Other Topics Concern    Not on file   Social History Narrative    Not on file     Social Determinants of Health     Financial Resource Strain:     Difficulty of Paying Living Expenses: Not on file   Food Insecurity:     Worried About Running Out of Food in the Last Year: Not on file    Dung of Food in the Last Year: Not on file   Transportation Needs:     Lack of Transportation (Medical): Not on file    Lack of Transportation (Non-Medical):  Not on file   Physical Activity:     Days of Exercise per Week: Not on file    Minutes of Exercise per Session: Not on file Stress:     Feeling of Stress : Not on file   Social Connections:     Frequency of Communication with Friends and Family: Not on file    Frequency of Social Gatherings with Friends and Family: Not on file    Attends [employer] Services: Not on file    Active Member of [address] [place] [address] [employer] or Organizations: Not on file    Attends Club or Organization Meetings: Not on file    Marital Status: Not on file   Intimate Partner Violence:     Fear of Current or Ex-Partner: Not on file    Emotionally Abused: Not on file    Physically Abused: Not on file    Sexually Abused: Not on file   Housing Stability:     Unable to Pay for Housing in the Last Year: Not on file    Number of [place] in the Last Year: Not on file    Unstable Housing in the Last Year: Not on file         PHYSICAL EXAM       ED Triage Vitals   BP Temp Temp src Pulse Resp SpO2 Height Weight   -- -- -- -- -- -- -- --       Physical Exam  Vitals and nursing note reviewed. Constitutional:       Appearance: He is well-developed. HENT:      Head: Normocephalic. Right Ear: External ear normal.      Left Ear: External ear normal.   Eyes:      Conjunctiva/sclera: Conjunctivae normal.      Pupils: Pupils are equal, round, and reactive to light. Cardiovascular:      Rate and Rhythm: Normal rate and regular rhythm. Heart sounds: Normal heart sounds. Pulmonary:      Effort: Pulmonary effort is normal.      Breath sounds: Normal breath sounds. Abdominal:      General: Bowel sounds are normal. There is no distension. Palpations: Abdomen is soft. Tenderness: There is no abdominal tenderness. Musculoskeletal:         General: Normal range of motion. Cervical back: Normal range of motion and neck supple. Skin:     General: Skin is warm and dry. Neurological:      Mental Status: He is alert and oriented to person, place, and time.    Psychiatric:         Mood and Affect: Mood normal.           MDM  65 yo male presents to the ED with sob, ab pain. Pt is afebrile, hemodynamically stable. Pt given IV morphine, IV zofran with moderate relief. EKG shows NSR with HR 99, left axis, normal intervals, no ST changes. Labs remarkable for glucose 171, Cr 1.24, troponin 0.051, procalcitonin 0.22, lactic acid 2.5. CTA chest negative. CT AP shows cirrhosis, liver infarction, exophytic lesions in kidneys. Pt reassessed and feels completely better. Pt states that his pain is what caused his sob. Pt states he was in the [employer] and has had multiple ab trauma. Pt states he knew he had cirrhosis and a hepatic infarction. Pt offered admission however wanted to go home. Pt requesting to go home and [facility]. Pt understands plan. Pt given prescription for norco, given sob, ab pain warning signs and will f/u with pcp. FINAL IMPRESSION      1. Right upper quadrant abdominal pain    2. Liver lesion    3. Kidney lesion    4.  Cough          DISPOSITION/PLAN   DISPOSITION Decision To Discharge 06/09/2022 11:00:26 PM        DISCHARGE MEDICATIONS:  [unfilled]         Moni Hernandez MD(electronically signed)  Attending Emergency Physician            Moni Hernandez MD  06/11/22 [id number]

## 2022-06-10 LAB
GFR AFRICAN AMERICAN: >60
GFR NON-AFRICAN AMERICAN: 53
PERFORMED ON: ABNORMAL
POC CREATININE: 1.3 MG/DL (ref 0.8–1.3)
POC SAMPLE TYPE: ABNORMAL

## 2022-06-11 LAB
EKG ATRIAL RATE: 96 BPM
EKG P AXIS: 25 DEGREES
EKG P-R INTERVAL: 162 MS
EKG Q-T INTERVAL: 358 MS
EKG QRS DURATION: 86 MS
EKG QTC CALCULATION (BAZETT): 457 MS
EKG R AXIS: -62 DEGREES
EKG T AXIS: 50 DEGREES
EKG VENTRICULAR RATE: 98 BPM

## 2022-06-11 PROCEDURE — 93010 ELECTROCARDIOGRAM REPORT: CPT | Performed by: INTERNAL MEDICINE

## 2022-06-15 LAB
BLOOD CULTURE, ROUTINE: NORMAL
CULTURE, BLOOD 2: NORMAL

## 2023-01-08 ENCOUNTER — APPOINTMENT (OUTPATIENT)
Dept: CT IMAGING | Age: 78
End: 2023-01-08
Payer: MEDICARE

## 2023-01-08 ENCOUNTER — HOSPITAL ENCOUNTER (INPATIENT)
Age: 78
LOS: 12 days | Discharge: HOSPICE | End: 2023-01-20
Attending: EMERGENCY MEDICINE | Admitting: INTERNAL MEDICINE
Payer: MEDICARE

## 2023-01-08 ENCOUNTER — APPOINTMENT (OUTPATIENT)
Dept: GENERAL RADIOLOGY | Age: 78
End: 2023-01-08
Payer: MEDICARE

## 2023-01-08 DIAGNOSIS — F32.2 CURRENT SEVERE EPISODE OF MAJOR DEPRESSIVE DISORDER WITHOUT PSYCHOTIC FEATURES WITHOUT PRIOR EPISODE (HCC): ICD-10-CM

## 2023-01-08 DIAGNOSIS — R77.8 ELEVATED TROPONIN: ICD-10-CM

## 2023-01-08 DIAGNOSIS — R53.1 GENERAL WEAKNESS: Primary | ICD-10-CM

## 2023-01-08 DIAGNOSIS — N17.9 AKI (ACUTE KIDNEY INJURY) (HCC): ICD-10-CM

## 2023-01-08 DIAGNOSIS — E87.20 LACTIC ACIDOSIS: ICD-10-CM

## 2023-01-08 PROBLEM — R45.851 SUICIDAL IDEATION: Status: ACTIVE | Noted: 2023-01-01

## 2023-01-08 LAB
ALBUMIN SERPL-MCNC: 2.2 G/DL (ref 3.5–4.6)
ALP BLD-CCNC: 289 U/L (ref 35–104)
ALT SERPL-CCNC: 36 U/L (ref 0–41)
ANION GAP SERPL CALCULATED.3IONS-SCNC: 15 MEQ/L (ref 9–15)
APTT: 33.6 SEC (ref 24.4–36.8)
AST SERPL-CCNC: 71 U/L (ref 0–40)
BACTERIA: NEGATIVE /HPF
BASE EXCESS ARTERIAL: -1 (ref -3–3)
BASOPHILS ABSOLUTE: 0 K/UL (ref 0–0.2)
BASOPHILS RELATIVE PERCENT: 0.3 %
BILIRUB SERPL-MCNC: 2.1 MG/DL (ref 0.2–0.7)
BILIRUBIN DIRECT: 1.3 MG/DL (ref 0–0.4)
BILIRUBIN URINE: ABNORMAL
BILIRUBIN, INDIRECT: 0.8 MG/DL (ref 0–0.6)
BLOOD, URINE: ABNORMAL
BUN BLDV-MCNC: 38 MG/DL (ref 8–23)
CALCIUM IONIZED: 1.12 MMOL/L (ref 1.12–1.32)
CALCIUM SERPL-MCNC: 8.8 MG/DL (ref 8.5–9.9)
CHLORIDE BLD-SCNC: 97 MEQ/L (ref 95–107)
CLARITY: CLEAR
CO2: 25 MEQ/L (ref 20–31)
COLOR: ABNORMAL
CREAT SERPL-MCNC: 1.6 MG/DL (ref 0.7–1.2)
EOSINOPHILS ABSOLUTE: 0 K/UL (ref 0–0.7)
EOSINOPHILS RELATIVE PERCENT: 0.3 %
EPITHELIAL CELLS, UA: ABNORMAL /HPF (ref 0–5)
ETHANOL PERCENT: NORMAL G/DL
ETHANOL: <10 MG/DL (ref 0–0.08)
GFR SERPL CREATININE-BSD FRML MDRD: 43 ML/MIN/{1.73_M2}
GFR SERPL CREATININE-BSD FRML MDRD: 43.5 ML/MIN/{1.73_M2}
GLUCOSE BLD-MCNC: 69 MG/DL (ref 70–99)
GLUCOSE BLD-MCNC: 79 MG/DL (ref 70–99)
GLUCOSE BLD-MCNC: 95 MG/DL (ref 70–99)
GLUCOSE URINE: NEGATIVE MG/DL
HCO3 ARTERIAL: 23.4 MMOL/L (ref 21–29)
HCT VFR BLD CALC: 46 % (ref 42–52)
HEMOGLOBIN: 14.9 G/DL (ref 14–18)
HEMOGLOBIN: 17.4 GM/DL (ref 13.5–17.5)
HYALINE CASTS: ABNORMAL /HPF (ref 0–5)
INR BLD: 1.4
KETONES, URINE: ABNORMAL MG/DL
LACTATE: 2.91 MMOL/L (ref 0.4–2)
LACTIC ACID: 3.8 MMOL/L (ref 0.5–2.2)
LEUKOCYTE ESTERASE, URINE: ABNORMAL
LIPASE: 39 U/L (ref 12–95)
LYMPHOCYTES ABSOLUTE: 1.5 K/UL (ref 1–4.8)
LYMPHOCYTES RELATIVE PERCENT: 13.1 %
MAGNESIUM: 2.4 MG/DL (ref 1.7–2.4)
MCH RBC QN AUTO: 29.5 PG (ref 27–31.3)
MCHC RBC AUTO-ENTMCNC: 32.4 % (ref 33–37)
MCV RBC AUTO: 91.2 FL (ref 79–92.2)
MONOCYTES ABSOLUTE: 1 K/UL (ref 0.2–0.8)
MONOCYTES RELATIVE PERCENT: 8.6 %
NEUTROPHILS ABSOLUTE: 8.6 K/UL (ref 1.4–6.5)
NEUTROPHILS RELATIVE PERCENT: 77.7 %
NITRITE, URINE: NEGATIVE
O2 SAT, ARTERIAL: 95 % (ref 93–100)
PCO2 ARTERIAL: 33 MM HG (ref 35–45)
PDW BLD-RTO: 18.7 % (ref 11.5–14.5)
PERFORMED ON: ABNORMAL
PERFORMED ON: NORMAL
PH ARTERIAL: 7.46 (ref 7.35–7.45)
PH UA: 5.5 (ref 5–9)
PLATELET # BLD: 206 K/UL (ref 130–400)
PO2 ARTERIAL: 69 MM HG (ref 75–108)
POC CHLORIDE: 109 MEQ/L (ref 99–110)
POC CREATININE: 1.6 MG/DL (ref 0.8–1.3)
POC FIO2: 21
POC HEMATOCRIT: 51 % (ref 41–53)
POC POTASSIUM: 4.1 MEQ/L (ref 3.5–5.1)
POC SAMPLE TYPE: ABNORMAL
POC SODIUM: 140 MEQ/L (ref 136–145)
POTASSIUM SERPL-SCNC: 4.1 MEQ/L (ref 3.4–4.9)
PRO-BNP: 367 PG/ML
PROTEIN UA: NEGATIVE MG/DL
PROTHROMBIN TIME: 17.1 SEC (ref 12.3–14.9)
RBC # BLD: 5.05 M/UL (ref 4.7–6.1)
RBC UA: ABNORMAL /HPF (ref 0–5)
SARS-COV-2, NAAT: NOT DETECTED
SODIUM BLD-SCNC: 137 MEQ/L (ref 135–144)
SPECIFIC GRAVITY UA: 1.02 (ref 1–1.03)
TCO2 ARTERIAL: 24 MMOL/L (ref 21–32)
TOTAL PROTEIN: 7.1 G/DL (ref 6.3–8)
TROPONIN: 0.05 NG/ML (ref 0–0.01)
TSH SERPL DL<=0.05 MIU/L-ACNC: 2.62 UIU/ML (ref 0.44–3.86)
UROBILINOGEN, URINE: 2 E.U./DL
WBC # BLD: 11.1 K/UL (ref 4.8–10.8)
WBC UA: ABNORMAL /HPF (ref 0–5)

## 2023-01-08 PROCEDURE — 82077 ASSAY SPEC XCP UR&BREATH IA: CPT

## 2023-01-08 PROCEDURE — 83735 ASSAY OF MAGNESIUM: CPT

## 2023-01-08 PROCEDURE — 2060000000 HC ICU INTERMEDIATE R&B

## 2023-01-08 PROCEDURE — 71045 X-RAY EXAM CHEST 1 VIEW: CPT

## 2023-01-08 PROCEDURE — 84443 ASSAY THYROID STIM HORMONE: CPT

## 2023-01-08 PROCEDURE — 87635 SARS-COV-2 COVID-19 AMP PRB: CPT

## 2023-01-08 PROCEDURE — 36415 COLL VENOUS BLD VENIPUNCTURE: CPT

## 2023-01-08 PROCEDURE — 82565 ASSAY OF CREATININE: CPT

## 2023-01-08 PROCEDURE — 85025 COMPLETE CBC W/AUTO DIFF WBC: CPT

## 2023-01-08 PROCEDURE — 93005 ELECTROCARDIOGRAM TRACING: CPT | Performed by: EMERGENCY MEDICINE

## 2023-01-08 PROCEDURE — 70450 CT HEAD/BRAIN W/O DYE: CPT

## 2023-01-08 PROCEDURE — 85610 PROTHROMBIN TIME: CPT

## 2023-01-08 PROCEDURE — 85014 HEMATOCRIT: CPT

## 2023-01-08 PROCEDURE — 81001 URINALYSIS AUTO W/SCOPE: CPT

## 2023-01-08 PROCEDURE — 83690 ASSAY OF LIPASE: CPT

## 2023-01-08 PROCEDURE — 85730 THROMBOPLASTIN TIME PARTIAL: CPT

## 2023-01-08 PROCEDURE — 6360000002 HC RX W HCPCS: Performed by: EMERGENCY MEDICINE

## 2023-01-08 PROCEDURE — 36600 WITHDRAWAL OF ARTERIAL BLOOD: CPT

## 2023-01-08 PROCEDURE — 87040 BLOOD CULTURE FOR BACTERIA: CPT

## 2023-01-08 PROCEDURE — 99285 EMERGENCY DEPT VISIT HI MDM: CPT

## 2023-01-08 PROCEDURE — 80076 HEPATIC FUNCTION PANEL: CPT

## 2023-01-08 PROCEDURE — 84132 ASSAY OF SERUM POTASSIUM: CPT

## 2023-01-08 PROCEDURE — 82330 ASSAY OF CALCIUM: CPT

## 2023-01-08 PROCEDURE — 80048 BASIC METABOLIC PNL TOTAL CA: CPT

## 2023-01-08 PROCEDURE — 84295 ASSAY OF SERUM SODIUM: CPT

## 2023-01-08 PROCEDURE — 84484 ASSAY OF TROPONIN QUANT: CPT

## 2023-01-08 PROCEDURE — 2580000003 HC RX 258: Performed by: EMERGENCY MEDICINE

## 2023-01-08 PROCEDURE — 83880 ASSAY OF NATRIURETIC PEPTIDE: CPT

## 2023-01-08 PROCEDURE — P9046 ALBUMIN (HUMAN), 25%, 20 ML: HCPCS | Performed by: EMERGENCY MEDICINE

## 2023-01-08 PROCEDURE — 82803 BLOOD GASES ANY COMBINATION: CPT

## 2023-01-08 PROCEDURE — 82435 ASSAY OF BLOOD CHLORIDE: CPT

## 2023-01-08 PROCEDURE — 83605 ASSAY OF LACTIC ACID: CPT

## 2023-01-08 RX ORDER — ALBUMIN (HUMAN) 12.5 G/50ML
50 SOLUTION INTRAVENOUS ONCE
Status: DISCONTINUED | OUTPATIENT
Start: 2023-01-08 | End: 2023-01-08 | Stop reason: CLARIF

## 2023-01-08 RX ORDER — 0.9 % SODIUM CHLORIDE 0.9 %
1000 INTRAVENOUS SOLUTION INTRAVENOUS ONCE
Status: COMPLETED | OUTPATIENT
Start: 2023-01-08 | End: 2023-01-08

## 2023-01-08 RX ADMIN — ALBUMIN HUMAN 50 G: 0.25 SOLUTION INTRAVENOUS at 22:05

## 2023-01-08 RX ADMIN — SODIUM CHLORIDE 1000 ML: 9 INJECTION, SOLUTION INTRAVENOUS at 20:23

## 2023-01-08 ASSESSMENT — ENCOUNTER SYMPTOMS
ABDOMINAL PAIN: 0
BACK PAIN: 0
NAUSEA: 0
VOMITING: 0
SHORTNESS OF BREATH: 0
COUGH: 0
SORE THROAT: 0
DIARRHEA: 0

## 2023-01-08 ASSESSMENT — PAIN - FUNCTIONAL ASSESSMENT: PAIN_FUNCTIONAL_ASSESSMENT: NONE - DENIES PAIN

## 2023-01-08 NOTE — ED TRIAGE NOTES
1835: Patient arrived via [facility]. Patient's neighbor called 911 due to increasing weakness, shortness of breath and difficulty ambulating.

## 2023-01-08 NOTE — ED PROVIDER NOTES
[address] [facility] ED  eMERGENCY dEPARTMENT eNCOUnter      Pt Name: Ezequiel Rodarte  MRN: [id number]  [place] 1945  Date of evaluation: 1/8/2023  Provider: Liliya De La Torre MD        HISTORY OF PRESENT ILLNESS    Ezequiel Rodarte is a 76 y.o. male per chart review has a DM II presents to the ED with weakness, myalgia. Pt notes gradual onset, moderate, constant, diffuse weakness x 2 weeks. +Myalgia. Family notes that pt has been intermittently confused. Pt denies fever, n/v, cp, sob, ab pain, dysuria, diarrhea. REVIEW OF SYSTEMS       Review of Systems   Constitutional:  Negative for activity change, chills and fever. HENT:  Negative for ear pain and sore throat. Eyes:  Negative for visual disturbance. Respiratory:  Negative for cough and shortness of breath. Cardiovascular:  Negative for chest pain, palpitations and leg swelling. Gastrointestinal:  Negative for abdominal pain, diarrhea, nausea and vomiting. Genitourinary:  Negative for dysuria. Musculoskeletal:  Positive for myalgias. Negative for back pain. Skin:  Negative for rash. Neurological:  Positive for weakness. Negative for dizziness. Except as noted above the remainder of the review of systems was reviewed and negative. PAST MEDICAL HISTORY     Past Medical History:   Diagnosis Date    Diabetes mellitus ([address]          SURGICAL HISTORY     No past surgical history on file.       CURRENT MEDICATIONS       Previous Medications    ATORVASTATIN (LIPITOR) 40 MG TABLET    Take 1 tablet by mouth daily    BLOOD GLUCOSE MONITORING SUPPL (ONETOUCH VERIO) W/DEVICE KIT    As directed    BLOOD GLUCOSE TEST STRIPS (ONETOUCH VERIO) STRIP    Qid on insulin e 11.65    INSULIN GLARGINE (LANTUS SOLOSTAR) 100 UNIT/ML INJECTION PEN    80 units at bedtime    INSULIN GLARGINE (LANTUS SOLOSTAR) 100 UNIT/ML INJECTION PEN    50 units at bedtime    INSULIN LISPRO, 1 UNIT DIAL, (HUMALOG KWIKPEN) 100 UNIT/ML SOPN    20 units at each meals INSULIN LISPRO, 1 UNIT DIAL, (HUMALOG KWIKPEN) 100 UNIT/ML SOPN    16 units with each meals    INSULIN PEN NEEDLE (NOVOFINE) 32G X 6 MM MISC    qid    INSULIN PEN NEEDLE (NOVOFINE) 32G X 6 MM MISC    qid    LISINOPRIL (PRINIVIL;ZESTRIL) 20 MG TABLET    Take 1 tablet by mouth daily    ONETOUCH DELICA LANCETS 25V MISC    Qid on insulin       ALLERGIES     Patient has no known allergies. FAMILY HISTORY     No family history on file. SOCIAL HISTORY       Social History     Socioeconomic History    Marital status:    Tobacco Use    Smoking status: Never    Smokeless tobacco: Never   Vaping Use    Vaping Use: Never used   Substance and Sexual Activity    Alcohol use: Never    Drug use: Never         PHYSICAL EXAM        ED Triage Vitals   BP Temp Temp src Pulse Resp SpO2 Height Weight   -- -- -- -- -- -- -- --       Physical Exam  Vitals and nursing note reviewed. Constitutional:       Appearance: He is well-developed. HENT:      Head: Normocephalic. Right Ear: External ear normal.      Left Ear: External ear normal.   Eyes:      Conjunctiva/sclera: Conjunctivae normal.      Pupils: Pupils are equal, round, and reactive to light. Cardiovascular:      Rate and Rhythm: Normal rate and regular rhythm. Heart sounds: Normal heart sounds. Pulmonary:      Effort: Pulmonary effort is normal.      Breath sounds: Normal breath sounds. Abdominal:      General: Bowel sounds are normal. There is no distension. Palpations: Abdomen is soft. Tenderness: There is no abdominal tenderness. Musculoskeletal:         General: Normal range of motion. Cervical back: Normal range of motion and neck supple. Comments: 3+ edema noted in bilateral lower extremities   Skin:     General: Skin is warm and dry. Neurological:      Mental Status: He is alert and oriented to person, place, and time.    Psychiatric:         Mood and Affect: Mood normal.         LABS:  Labs Reviewed   CBC WITH AUTO DIFFERENTIAL - Abnormal; Notable for the following components:       Result Value    WBC 11.1 (*)     MCHC 32.4 (*)     RDW 18.7 (*)     Neutrophils Absolute 8.6 (*)     Monocytes Absolute 1.0 (*)     All other components within normal limits   BASIC METABOLIC PANEL - Abnormal; Notable for the following components:    Glucose 69 (*)     BUN 38 (*)     Creatinine 1.60 (*)     Est, Glom Filt Rate 43.5 (*)     All other components within normal limits   LACTIC ACID - Abnormal; Notable for the following components:    Lactic Acid 3.8 (*)     All other components within normal limits    Narrative:     CALL  [facility]  ED tel. [phone],  LACID results called to and read back by DR Ananya Anne, 01/08/2023 20:06, by [address] - Abnormal; Notable for the following components:    Albumin 2.2 (*)     Alkaline Phosphatase 289 (*)     AST 71 (*)     Total Bilirubin 2.1 (*)     Bilirubin, Direct 1.3 (*)     Bilirubin, Indirect 0.8 (*)     All other components within normal limits   TROPONIN - Abnormal; Notable for the following components:    Troponin 0.054 (*)     All other components within normal limits    Narrative:     CALL  [facility]  ED tel. [phone],  TROP results called to and read back by DR Ananya Anne, 01/08/2023 20:06, by [facility]   POCT ARTERIAL - Abnormal; Notable for the following components:    POC Creatinine 1.6 (*)     Est, Glom Filt Rate 43 (*)     pH, Arterial 7.457 (*)     pCO2, Arterial 33 (*)     pO2, Arterial 69 (*)     O2 Sat, Arterial 95 (*)     Lactate 2.91 (*)     All other components within normal limits   COVID-19, RAPID   CULTURE, BLOOD 1   CULTURE, BLOOD 1   ETHANOL   LIPASE   MAGNESIUM   TSH    Narrative:     CALL  [facility]  ED tel. [phone],  TROP results called to and read back by DR Ananya Anne, 01/08/2023 20:06, by [address] [place] [address]   APTT   URINALYSIS   POCT EPOC BLOOD GAS, LACTIC ACID, ICA         MDM:   Vitals:    Vitals:    01/08/23 1846 01/08/23 2016   BP:  105/78 Pulse:  92   Resp:  17   Temp:  97.9 °F (36.6 °C)   TempSrc:  Oral   SpO2:  96%   Weight: 268 lb (121.6 kg)    Height: 5' 8\" (1.727 m)        75 yo male presents to the ED with weakness, myalgia. Pt is afebrile, hemodynamically stable. Pt is clinically fluid overloaded. EKG shows NSR with HR 96, left axis, normal intervals, no ST changes. ABG unremarkable. Labs remarkable for wBC 11, BUN 38, Cr 1.6, albumin 2.2, lactic acid 3.8, troponin 0.054. Pt without chest pain, sob. Low suspicion for ACS. Pt's troponin likely elevated due to SALOME. CT head, CXR negative. Upon leaving the room pt states that he could walk last month however has not been able to walk for 2 weeks. He is so depressed that he can't walk that he said he would shoot himself if he could. Pt pink slipped in the ED. However, given weakness, SALOME, elevated troponin, lactic acidosis, depression with SI, case discussed with Dr. Deshawn Plasencia (medicine) and pt admitted to medicine in serious condition. CRITICAL CARE TIME   Total CriticalCare time was 0 minutes, excluding separately reportable procedures. There was a high probability of clinically significant/life threatening deterioration in the patient's condition which required my urgent intervention. PROCEDURES:  Unlessotherwise noted below, none      Procedures      FINAL IMPRESSION      1. General weakness    2. SALOME (acute kidney injury) ([address]    3. Lactic acidosis    4. Elevated troponin    5.  Current severe episode of major depressive disorder without psychotic features without prior episode [facility])          DISPOSITION/PLAN   DISPOSITION Admitted 01/08/2023 08:18:52 PM          Janice Casas MD (electronically signed)  Attending Emergency Physician          Janice Casas MD  01/08/23 2020

## 2023-01-08 NOTE — ED NOTES
Lab called to draw blood cultures. Lab stated they will send someone.       America Jaramillo RN  01/08/23 2859

## 2023-01-09 ENCOUNTER — APPOINTMENT (OUTPATIENT)
Dept: ULTRASOUND IMAGING | Age: 78
End: 2023-01-09
Payer: MEDICARE

## 2023-01-09 PROBLEM — R53.1 GENERAL WEAKNESS: Status: ACTIVE | Noted: 2023-01-01

## 2023-01-09 LAB
ALBUMIN SERPL-MCNC: 2.1 G/DL (ref 3.5–4.6)
ALP BLD-CCNC: 220 U/L (ref 35–104)
ALT SERPL-CCNC: 30 U/L (ref 0–41)
AMYLASE: 50 U/L (ref 22–93)
ANION GAP SERPL CALCULATED.3IONS-SCNC: 15 MEQ/L (ref 9–15)
AST SERPL-CCNC: 52 U/L (ref 0–40)
BASOPHILS ABSOLUTE: 0 K/UL (ref 0–0.2)
BASOPHILS RELATIVE PERCENT: 0.2 %
BILIRUB SERPL-MCNC: 2.1 MG/DL (ref 0.2–0.7)
BUN BLDV-MCNC: 37 MG/DL (ref 8–23)
CALCIUM SERPL-MCNC: 8.4 MG/DL (ref 8.5–9.9)
CHLORIDE BLD-SCNC: 102 MEQ/L (ref 95–107)
CO2: 22 MEQ/L (ref 20–31)
CREAT SERPL-MCNC: 1.54 MG/DL (ref 0.7–1.2)
CREATININE URINE: 144.8 MG/DL
EKG ATRIAL RATE: 88 BPM
EKG P AXIS: 32 DEGREES
EKG P-R INTERVAL: 168 MS
EKG Q-T INTERVAL: 356 MS
EKG QRS DURATION: 78 MS
EKG QTC CALCULATION (BAZETT): 479 MS
EKG R AXIS: -52 DEGREES
EKG T AXIS: 48 DEGREES
EKG VENTRICULAR RATE: 109 BPM
EOSINOPHILS ABSOLUTE: 0 K/UL (ref 0–0.7)
EOSINOPHILS RELATIVE PERCENT: 0.2 %
GFR SERPL CREATININE-BSD FRML MDRD: 45.5 ML/MIN/{1.73_M2}
GLOBULIN: 4 G/DL (ref 2.3–3.5)
GLUCOSE BLD-MCNC: 101 MG/DL (ref 70–99)
GLUCOSE BLD-MCNC: 117 MG/DL (ref 70–99)
GLUCOSE BLD-MCNC: 142 MG/DL (ref 70–99)
GLUCOSE BLD-MCNC: 78 MG/DL (ref 70–99)
GLUCOSE BLD-MCNC: 88 MG/DL (ref 70–99)
HBA1C MFR BLD: 5.7 % (ref 4.8–5.9)
HCT VFR BLD CALC: 40 % (ref 42–52)
HEMOGLOBIN: 13 G/DL (ref 14–18)
HEPATITIS B SURFACE ANTIGEN INTERPRETATION: NORMAL
LACTIC ACID: 2.9 MMOL/L (ref 0.5–2.2)
LV EF: 65 %
LVEF MODALITY: NORMAL
LYMPHOCYTES ABSOLUTE: 1.1 K/UL (ref 1–4.8)
LYMPHOCYTES RELATIVE PERCENT: 13.9 %
MCH RBC QN AUTO: 29.4 PG (ref 27–31.3)
MCHC RBC AUTO-ENTMCNC: 32.6 % (ref 33–37)
MCV RBC AUTO: 90.3 FL (ref 79–92.2)
MONOCYTES ABSOLUTE: 0.6 K/UL (ref 0.2–0.8)
MONOCYTES RELATIVE PERCENT: 7.5 %
NEUTROPHILS ABSOLUTE: 6.1 K/UL (ref 1.4–6.5)
NEUTROPHILS RELATIVE PERCENT: 78.2 %
PDW BLD-RTO: 18.5 % (ref 11.5–14.5)
PERFORMED ON: ABNORMAL
PERFORMED ON: ABNORMAL
PERFORMED ON: NORMAL
PERFORMED ON: NORMAL
PLATELET # BLD: 141 K/UL (ref 130–400)
POTASSIUM REFLEX MAGNESIUM: 4.5 MEQ/L (ref 3.4–4.9)
RBC # BLD: 4.43 M/UL (ref 4.7–6.1)
SODIUM BLD-SCNC: 139 MEQ/L (ref 135–144)
TOTAL PROTEIN: 6.1 G/DL (ref 6.3–8)
TROPONIN: 0.04 NG/ML (ref 0–0.01)
TROPONIN: 0.04 NG/ML (ref 0–0.01)
WBC # BLD: 7.8 K/UL (ref 4.8–10.8)

## 2023-01-09 PROCEDURE — 93306 TTE W/DOPPLER COMPLETE: CPT

## 2023-01-09 PROCEDURE — 82570 ASSAY OF URINE CREATININE: CPT

## 2023-01-09 PROCEDURE — 97167 OT EVAL HIGH COMPLEX 60 MIN: CPT

## 2023-01-09 PROCEDURE — 90792 PSYCH DIAG EVAL W/MED SRVCS: CPT | Performed by: PSYCHIATRY & NEUROLOGY

## 2023-01-09 PROCEDURE — 83605 ASSAY OF LACTIC ACID: CPT

## 2023-01-09 PROCEDURE — 84484 ASSAY OF TROPONIN QUANT: CPT

## 2023-01-09 PROCEDURE — 82150 ASSAY OF AMYLASE: CPT

## 2023-01-09 PROCEDURE — 83516 IMMUNOASSAY NONANTIBODY: CPT

## 2023-01-09 PROCEDURE — 83036 HEMOGLOBIN GLYCOSYLATED A1C: CPT

## 2023-01-09 PROCEDURE — 87340 HEPATITIS B SURFACE AG IA: CPT

## 2023-01-09 PROCEDURE — 99223 1ST HOSP IP/OBS HIGH 75: CPT | Performed by: SPECIALIST

## 2023-01-09 PROCEDURE — 6370000000 HC RX 637 (ALT 250 FOR IP): Performed by: SPECIALIST

## 2023-01-09 PROCEDURE — 6360000002 HC RX W HCPCS

## 2023-01-09 PROCEDURE — 93975 VASCULAR STUDY: CPT

## 2023-01-09 PROCEDURE — 93970 EXTREMITY STUDY: CPT

## 2023-01-09 PROCEDURE — 2060000000 HC ICU INTERMEDIATE R&B

## 2023-01-09 PROCEDURE — 97163 PT EVAL HIGH COMPLEX 45 MIN: CPT

## 2023-01-09 PROCEDURE — 85025 COMPLETE CBC W/AUTO DIFF WBC: CPT

## 2023-01-09 PROCEDURE — 36415 COLL VENOUS BLD VENIPUNCTURE: CPT

## 2023-01-09 PROCEDURE — 93010 ELECTROCARDIOGRAM REPORT: CPT | Performed by: INTERNAL MEDICINE

## 2023-01-09 PROCEDURE — 80053 COMPREHEN METABOLIC PANEL: CPT

## 2023-01-09 RX ORDER — ENOXAPARIN SODIUM 100 MG/ML
30 INJECTION SUBCUTANEOUS 2 TIMES DAILY
Status: DISCONTINUED | OUTPATIENT
Start: 2023-01-09 | End: 2023-01-20

## 2023-01-09 RX ORDER — ONDANSETRON 4 MG/1
4 TABLET, ORALLY DISINTEGRATING ORAL EVERY 8 HOURS PRN
Status: DISCONTINUED | OUTPATIENT
Start: 2023-01-09 | End: 2023-01-20 | Stop reason: HOSPADM

## 2023-01-09 RX ORDER — INSULIN LISPRO 100 [IU]/ML
0-8 INJECTION, SOLUTION INTRAVENOUS; SUBCUTANEOUS
Status: DISCONTINUED | OUTPATIENT
Start: 2023-01-09 | End: 2023-01-20 | Stop reason: HOSPADM

## 2023-01-09 RX ORDER — LIDOCAINE 40 MG/G
CREAM TOPICAL PRN
Status: DISCONTINUED | OUTPATIENT
Start: 2023-01-09 | End: 2023-01-09

## 2023-01-09 RX ORDER — ACETAMINOPHEN 325 MG/1
650 TABLET ORAL EVERY 6 HOURS PRN
Status: DISCONTINUED | OUTPATIENT
Start: 2023-01-09 | End: 2023-01-09

## 2023-01-09 RX ORDER — DEXTROSE MONOHYDRATE 100 MG/ML
INJECTION, SOLUTION INTRAVENOUS CONTINUOUS PRN
Status: DISCONTINUED | OUTPATIENT
Start: 2023-01-09 | End: 2023-01-20 | Stop reason: HOSPADM

## 2023-01-09 RX ORDER — FUROSEMIDE 20 MG/1
20 TABLET ORAL DAILY
Status: DISCONTINUED | OUTPATIENT
Start: 2023-01-09 | End: 2023-01-11

## 2023-01-09 RX ORDER — INSULIN GLARGINE 100 [IU]/ML
50 INJECTION, SOLUTION SUBCUTANEOUS NIGHTLY
Status: DISCONTINUED | OUTPATIENT
Start: 2023-01-09 | End: 2023-01-16

## 2023-01-09 RX ORDER — ACETAMINOPHEN 650 MG/1
650 SUPPOSITORY RECTAL EVERY 6 HOURS PRN
Status: DISCONTINUED | OUTPATIENT
Start: 2023-01-09 | End: 2023-01-09

## 2023-01-09 RX ORDER — SPIRONOLACTONE 50 MG/1
50 TABLET, FILM COATED ORAL DAILY
Status: DISCONTINUED | OUTPATIENT
Start: 2023-01-09 | End: 2023-01-20 | Stop reason: HOSPADM

## 2023-01-09 RX ORDER — POLYETHYLENE GLYCOL 3350 17 G/17G
17 POWDER, FOR SOLUTION ORAL DAILY PRN
Status: DISCONTINUED | OUTPATIENT
Start: 2023-01-09 | End: 2023-01-20 | Stop reason: HOSPADM

## 2023-01-09 RX ORDER — INSULIN LISPRO 100 [IU]/ML
0-4 INJECTION, SOLUTION INTRAVENOUS; SUBCUTANEOUS NIGHTLY
Status: DISCONTINUED | OUTPATIENT
Start: 2023-01-09 | End: 2023-01-20 | Stop reason: HOSPADM

## 2023-01-09 RX ORDER — ONDANSETRON 2 MG/ML
4 INJECTION INTRAMUSCULAR; INTRAVENOUS EVERY 6 HOURS PRN
Status: DISCONTINUED | OUTPATIENT
Start: 2023-01-09 | End: 2023-01-20 | Stop reason: HOSPADM

## 2023-01-09 RX ADMIN — SPIRONOLACTONE 50 MG: 50 TABLET ORAL at 21:42

## 2023-01-09 RX ADMIN — ENOXAPARIN SODIUM 30 MG: 100 INJECTION SUBCUTANEOUS at 08:23

## 2023-01-09 RX ADMIN — ENOXAPARIN SODIUM 30 MG: 100 INJECTION SUBCUTANEOUS at 21:41

## 2023-01-09 RX ADMIN — FUROSEMIDE 20 MG: 20 TABLET ORAL at 21:42

## 2023-01-09 ASSESSMENT — ENCOUNTER SYMPTOMS
RHINORRHEA: 0
ABDOMINAL PAIN: 0
BACK PAIN: 0
COUGH: 1
COLOR CHANGE: 0
CONSTIPATION: 0
CHEST TIGHTNESS: 0
COUGH: 0
SHORTNESS OF BREATH: 0
PHOTOPHOBIA: 0
VOMITING: 0
NAUSEA: 0
ABDOMINAL DISTENTION: 0
TROUBLE SWALLOWING: 0
DIARRHEA: 0
SORE THROAT: 0

## 2023-01-09 NOTE — CONSULTS
[address] [place] [address] Department of Psychiatry  Behavioral Health Consult    REASON FOR CONSULT: Depression SI    CONSULTING PHYSICIAN:     History obtained from: Patient    HISTORY OF PRESENT ILLNESS:      The patient is a 76 y.o. male with no significant past psychiatric history of  mental illness who presents with depression and SI. Patient reported that he has been feeling increasingly depressed for the past few months. He has been living alone with no family here other than few friends. Patient daughter lives in [place] and sister live in [place]. Patient was having increasing depression with hopeless and worthless feeling and suicidal thoughts. He has stopped eating and drinking for at least 5 weeks and has not been sleeping good. Patient decided to ended call and was thinking of putting a gun to his head. He said that he had access to multiple guns at home. Patient currently is minimizing his suicidal thoughts and depressive symptoms. He is willing to receive help for the depression. Denies any acute stress other than the chronic loneliness        The patient is not currently receiving care for the above psychiatric illness. Psychiatric Review of Systems       Depression: yes     Diana or Hypomania:  no     Panic Attacks:  no     Phobias:  no     Obsessions and Compulsions:  no     PTSD : no     Hallucinations:  no     Delusions:  no      Substance Abuse History:  ETOH: no  Marijuana: no  Opiates: no  Other Drugs: no      Past Psychiatric History:  Prior Diagnosis: Denies any mental illness in the past  Psychiatrist: no  Therapist:no  Hospitalization: no  Hx of Suicidal Attempts: no  Hx of violence:  no  ECT: no    Past Medical History:        Diagnosis Date    Diabetes mellitus ([address]        Past Surgical History:    No past surgical history on file.     Medications Prior to Admission:   Medications Prior to Admission: insulin glargine (LANTUS SOLOSTAR) 100 UNIT/ML injection pen, 50 units at bedtime  insulin lispro, 1 Unit Dial, (HUMALOG KWIKPEN) 100 UNIT/ML SOPN, 16 units with each meals  Insulin Pen Needle (NOVOFINE) 32G X 6 MM MISC, qid  OneTouch Delica Lancets 01P MISC, Qid on insulin  blood glucose test strips (ONETOUCH VERIO) strip, Qid on insulin e 11.65  Blood Glucose Monitoring Suppl (ONETOUCH VERIO) w/Device KIT, As directed  insulin glargine (LANTUS SOLOSTAR) 100 UNIT/ML injection pen, 80 units at bedtime (Patient not taking: Reported on 1/9/2023)  insulin lispro, 1 Unit Dial, (HUMALOG KWIKPEN) 100 UNIT/ML SOPN, 20 units at each meals  Insulin Pen Needle (NOVOFINE) 32G X 6 MM MISC, qid  atorvastatin (LIPITOR) 40 MG tablet, Take 1 tablet by mouth daily (Patient not taking: Reported on 1/9/2023)  lisinopril (PRINIVIL;ZESTRIL) 20 MG tablet, Take 1 tablet by mouth daily (Patient not taking: Reported on 1/9/2023)    Allergies:  Patient has no known allergies. FAMILY/SOCIAL HISTORY:  No family history on file. Social History     Socioeconomic History    Marital status:       Spouse name: Not on file    Number of children: Not on file    Years of education: Not on file    Highest education level: Not on file   Occupational History    Not on file   Tobacco Use    Smoking status: Never    Smokeless tobacco: Never   Vaping Use    Vaping Use: Never used   Substance and Sexual Activity    Alcohol use: Never    Drug use: Never    Sexual activity: Not on file   Other Topics Concern    Not on file   Social History Narrative    Not on file     Social Determinants of Health     Financial Resource Strain: Not on file   Food Insecurity: Not on file   Transportation Needs: Not on file   Physical Activity: Not on file   Stress: Not on file   Social Connections: Not on file   Intimate Partner Violence: Not on file   Housing Stability: Not on file       REVIEW OF SYSTEMS    Constitutional: [] fever  [] chills  [] weight loss  []weakness [] Other:  Eyes:  [] photophobia  [] discharge [] acuity change   [] Diplopia   [] Other:  HENT:  [] sore throat  [] ear pain [] Tinnitus   [] Other  Respiratory:  [] Cough  [] Shortness of breath   [] Sputum   [] Other:   Cardiac: []Chest pain   []Palpitations []Edema  []PND  [] Other:  GI:  []Abdominal pain   []Nausea  []Vomiting  []Diarrhea  [] Other:  :  [] Dysuria   []Frequency  []Hematuria  []Discharge  [] Other:  Possible Pregnancy: []Yes   []No   LMP:   Musculoskeletal:  []Back pain  []Neck pain  []Recent Injury   Skin:  []Rash  [] Itching  [] Other:  Neurologic:  [] Headache  [] Focal weakness  [] Sensory changes []Other:  Endocrine:  [] Polyuria  [] Polydipsia  [] Hair Loss  [] Other:  Lymphatic:   [] Swollen glands   Psychiatric:  As per HPI      All other systems negative except as marked or mentioned/indicated in the HPI. Brandon Goins PHYSICAL EXAM:  Vitals:  /70   Pulse 90   Temp 97.4 °F (36.3 °C) (Oral)   Resp 20   Ht 5' 8\" (1.727 m)   Wt 268 lb (121.6 kg)   SpO2 96%   BMI 40.75 kg/m²      Neuro Exam:   Muscle Strength & Tone: full ROM  Gait: normal gait   Involuntary Movements: No    Mental Status Examination:    Level of consciousness:  within normal limits   Appearance:  ill-appearing  Behavior/Motor:  psychomotor retardation  Attitude toward examiner:  cooperative  Speech:  slow   Mood: constricted, decreased range, and depressed  Affect:  mood congruent  Thought processes:  slow   Association  Thought content:  Suicidal Ideation:  active  Cognition:  oriented to person, place, and time   Attention & Concentration poor  Memory intact  Mini Mental Status not completed because   Insight poor   Judgement fair   Fund of Knowledge adequate      DIAGNOSIS:    MDD single episode severe without psychosis        RISK ASSESSMENT: High risk of suicide.   Patient has access to multiple guns at home which needs to be secured        LABS: REVIEWED TODAY:  Recent Labs     01/08/23  1845 01/08/23  1853 01/09/23  0329   WBC 11.1*  --  7.8   HGB 14.9 17.4 13.0*    --  141     Recent Labs     01/08/23  1845 01/08/23  1853 01/09/23  0329     --  139   K 4.1  --  4.5   CL 97  --  102   CO2 25  --  22   BUN 38*  --  37*   CREATININE 1.60* 1.6* 1.54*   GLUCOSE 69*  --  101*     Recent Labs     01/08/23  1845 01/09/23  0329   BILITOT 2.1* 2.1*   ALKPHOS 289* 220*   AST 71* 52*   ALT 36 30     Lab Results   Component Value Date/Time    ETOH <10 01/08/2023 06:45 PM     Lab Results   Component Value Date/Time    TSH 2.620 01/08/2023 06:45 PM     No results found for: LITHIUM  No results found for: VALPROATE, CBMZ  No results found for: LITHIUM, VALPROATE    FURTHER LABS ORDERED :      Radiology ]  CT Head W/O Contrast    Result Date: 1/9/2023  EXAMINATION: CT OF THE HEAD WITHOUT CONTRAST  1/8/2023 7:50 pm TECHNIQUE: CT of the head was performed without the administration of intravenous contrast. Automated exposure control, iterative reconstruction, and/or weight based adjustment of the mA/kV was utilized to reduce the radiation dose to as low as reasonably achievable. COMPARISON: None. HISTORY: ORDERING SYSTEM PROVIDED HISTORY: AMS TECHNOLOGIST PROVIDED HISTORY: Reason for exam:->AMS Has a \"code stroke\" or \"stroke alert\" been called? ->No Decision Support Exception - unselect if not a suspected or confirmed emergency medical condition->Emergency Medical Condition (MA) What reading provider will be dictating this exam?->CRC FINDINGS: BRAIN/VENTRICLES: There is no acute intracranial hemorrhage, mass effect or midline shift. No abnormal extra-axial fluid collection. The gray-white differentiation is maintained without evidence of an acute infarct. There is no evidence of hydrocephalus. ORBITS: The visualized portion of the orbits demonstrate no acute abnormality. SINUSES: The visualized paranasal sinuses and mastoid air cells demonstrate no acute abnormality. SOFT TISSUES/SKULL:  No acute abnormality of the visualized skull or soft tissues. No acute intracranial abnormality. XR CHEST PORTABLE    Result Date: 1/8/2023  EXAMINATION: ONE XRAY VIEW OF THE CHEST 1/8/2023 7:44 pm COMPARISON: None. HISTORY: ORDERING SYSTEM PROVIDED HISTORY: AMS TECHNOLOGIST PROVIDED HISTORY: Reason for exam:->AMS What reading provider will be dictating this exam?->CRC FINDINGS: There is low lung volumes. There is minimal atelectasis in the lung bases with possible developing infiltrates and pleural effusion in the left base. Low lung volumes with atelectasis/possible developing left basilar pneumonia. US DUP LOWER EXTREMITIES BILATERAL VENOUS    Result Date: 1/9/2023  EXAMINATION: DUPLEX VENOUS ULTRASOUND OF THE BILATERAL LOWER EXTREMITIES1/9/2023 9:45 am TECHNIQUE: Duplex ultrasound using B-mode/gray scaled imaging, Doppler spectral analysis and color flow Doppler was obtained of the deep venous structures of the lower bilateral extremities. COMPARISON: None. HISTORY: ORDERING SYSTEM PROVIDED HISTORY: edema r/o DVT TECHNOLOGIST PROVIDED HISTORY: Reason for exam:->edema r/o DVT What reading provider will be dictating this exam?->CRC FINDINGS: The visualized veins of the bilateral lower extremities are patent and free of echogenic thrombus. The veins demonstrate good compressibility with normal color flow study and spectral analysis. No evidence of DVT in either lower extremity. EKG: TRACING REVIEWED    RECOMMENDATIONS    Risk Management:  suicide risk    Maintain one-to-one sitter due to high suicidal risk. Patient will benefit from admission to [address] when medically stable to treat his depression as well as to address his suicidality. Discussed with the treating physician/ team about the patient and treatment plan  Reviewed the chart    Thanks for the consult. Please call me if needed.     Electronically signed by Monica Lilly MD on 1/9/2023 at 4:35 PM

## 2023-01-09 NOTE — PROGRESS NOTES
[id number] pt denies any suicidal ideations  or plan at this time.  Electronically signed by Elissa Quan RN on 1/9/2023 at 8:53 AM

## 2023-01-09 NOTE — CONSULTS
Spiritual Care Services     Summary of Visit:  Pt is currently a one on one, pt appeared to be a little confused as to the purpose of HCPOA, Pt has a sister who he trusts fully with his health and material assets, pt has a daugther who lives out of states who he does not want as part of his HCPOA, pt was informed that he needs to ensure his request is in writing, pt stated that he was going to get a  to fill out his HCPOA, prayed with the pt before leaving the room,         Encounter Summary  Encounter Overview/Reason : Advance Care Planning  Service Provided For[de-identified] Patient  Referral/Consult From[de-identified] Physician, Nurse  Support System: Family members  Complexity of Encounter: Moderate  Begin Time: 1500  End Time : 1510  Total Time Calculated: 10 min  Encounter   Type: Initial Screen/Assessment     Spiritual/Emotional needs  Type: Spiritual Support                 Advance Care Planning  Type: ACP conversation    Spiritual Assessment/Intervention/Outcomes:    Assessment: Concerns with suffering    Intervention: Sustaining Presence/Ministry of presence, Active listening, Prayer (assurance of)/[place], Discussed meaning/purpose    Outcome: Comfort, Encouraged, Engaged in conversation, Expressed feelings, needs, and concerns      Care Plan:    Plan and Referrals  Plan/Referrals: Continue Support (comment)          Spiritual Care Services   Electronically signed by Chaplain Paris on 1/9/2023 at 3:21 PM.    To reach a  for emotional and spiritual support, place an [facility] consult request.   If a  is needed immediately, dial 0 and ask to page the on-call .

## 2023-01-09 NOTE — PROGRESS NOTES
Progress Note  Date:2023       [facility]:[id number]/[id number]  Patient Efraín Encarnacion     Date of Birth:     Age:77 y.o. Subjective    Subjective:  Symptoms:  He reports malaise and weakness. No shortness of breath, cough, chest pain, headache, chest pressure, anorexia, diarrhea or anxiety. Diet:  No nausea or vomiting. Review of Systems   Respiratory:  Negative for cough and shortness of breath. Cardiovascular:  Negative for chest pain. Gastrointestinal:  Negative for anorexia, diarrhea, nausea and vomiting. Neurological:  Positive for weakness. Objective         Vitals Last 24 Hours:  TEMPERATURE:  Temp  Av.7 °F (36.5 °C)  Min: 97.4 °F (36.3 °C)  Max: 97.9 °F (36.6 °C)  RESPIRATIONS RANGE: Resp  Av.8  Min: 16  Max: 20  PULSE OXIMETRY RANGE: SpO2  Av.5 %  Min: 94 %  Max: 99 %  PULSE RANGE: Pulse  Av.4  Min: 90  Max: 110  BLOOD PRESSURE RANGE: Systolic (53EGT), JUR:101 , Min:101 , JHS:256   ; Diastolic (53SGG), BT, Min:70, Max:93    I/O (24Hr): Intake/Output Summary (Last 24 hours) at 2023 1103  Last data filed at 2023 0851  Gross per 24 hour   Intake 200 ml   Output 175 ml   Net 25 ml     Objective:  General Appearance:  Comfortable, well-appearing and in no acute distress. Vital signs: (most recent): Blood pressure 105/70, pulse 90, temperature 97.4 °F (36.3 °C), temperature source Oral, resp. rate 20, height 5' 8\" (1.727 m), weight 268 lb (121.6 kg), SpO2 96 %. HEENT: Normal HEENT exam.    Lungs:  Normal effort. Heart: S1 normal and S2 normal.    Abdomen: Abdomen is soft. Bowel sounds are normal.     Extremities: Normal range of motion. There is dependent edema. Neurological: Patient is alert. Pupils:  Pupils are equal, round, and reactive to light. Skin:  Warm and dry.     Labs/Imaging/Diagnostics    Labs:  CBC:  Recent Labs     23  1845 23  1853 23  0329   WBC 11.1*  --  7.8   RBC 5.05  --  4.43*   HGB 14.9 17.4 13.0*   HCT 46.0  --  40.0*   MCV 91.2  --  90.3   RDW 18.7*  --  18.5*     --  141     CHEMISTRIES:  Recent Labs     01/08/23  1845 01/08/23  1853 01/09/23  0329     --  139   K 4.1  --  4.5   CL 97  --  102   CO2 25  --  22   BUN 38*  --  37*   CREATININE 1.60* 1.6* 1.54*   GLUCOSE 69*  --  101*   MG 2.4  --   --      PT/INR:  Recent Labs     01/08/23 1927   PROTIME 17.1*   INR 1.4     APTT:  Recent Labs     01/08/23 1927   APTT 33.6     LIVER PROFILE:  Recent Labs     01/08/23 1845 01/09/23 0329   AST 71* 52*   ALT 36 30   BILIDIR 1.3*  --    BILITOT 2.1* 2.1*   ALKPHOS 289* 220*       Imaging Last 24 Hours:  CT Head W/O Contrast    Result Date: 1/9/2023  EXAMINATION: CT OF THE HEAD WITHOUT CONTRAST  1/8/2023 7:50 pm TECHNIQUE: CT of the head was performed without the administration of intravenous contrast. Automated exposure control, iterative reconstruction, and/or weight based adjustment of the mA/kV was utilized to reduce the radiation dose to as low as reasonably achievable. COMPARISON: None. HISTORY: ORDERING SYSTEM PROVIDED HISTORY: AMS TECHNOLOGIST PROVIDED HISTORY: Reason for exam:->[facility] Has a \"code stroke\" or \"stroke alert\" been called? ->No Decision Support Exception - unselect if not a suspected or confirmed emergency medical condition->Emergency Medical Condition (MA) What reading provider will be dictating this exam?->CRC FINDINGS: BRAIN/VENTRICLES: There is no acute intracranial hemorrhage, mass effect or midline shift. No abnormal extra-axial fluid collection. The gray-white differentiation is maintained without evidence of an acute infarct. There is no evidence of hydrocephalus. ORBITS: The visualized portion of the orbits demonstrate no acute abnormality. SINUSES: The visualized paranasal sinuses and mastoid air cells demonstrate no acute abnormality. SOFT TISSUES/SKULL:  No acute abnormality of the visualized skull or soft tissues. No acute intracranial abnormality.      XR CHEST PORTABLE    Result Date: 1/8/2023  EXAMINATION: ONE XRAY VIEW OF THE CHEST 1/8/2023 7:44 pm COMPARISON: None. HISTORY: ORDERING SYSTEM PROVIDED HISTORY: AMS TECHNOLOGIST PROVIDED HISTORY: Reason for exam:->AMS What reading provider will be dictating this exam?->CRC FINDINGS: There is low lung volumes. There is minimal atelectasis in the lung bases with possible developing infiltrates and pleural effusion in the left base. Low lung volumes with atelectasis/possible developing left basilar pneumonia. US DUP LOWER EXTREMITIES BILATERAL VENOUS    Result Date: 1/9/2023  EXAMINATION: DUPLEX VENOUS ULTRASOUND OF THE BILATERAL LOWER EXTREMITIES1/9/2023 9:45 am TECHNIQUE: Duplex ultrasound using B-mode/gray scaled imaging, Doppler spectral analysis and color flow Doppler was obtained of the deep venous structures of the lower bilateral extremities. COMPARISON: None. HISTORY: ORDERING SYSTEM PROVIDED HISTORY: edema r/o DVT TECHNOLOGIST PROVIDED HISTORY: Reason for exam:->edema r/o DVT What reading provider will be dictating this exam?->CRC FINDINGS: The visualized veins of the bilateral lower extremities are patent and free of echogenic thrombus. The veins demonstrate good compressibility with normal color flow study and spectral analysis. No evidence of DVT in either lower extremity. Assessment//Plan           Hospital Problems             Last Modified POA    * (Principal) Suicidal ideation 1/8/2023 Yes   Abd distension  Elevated LFTS  Lower ext swelling  CKD  Suicidal ideal ation  Assessment & Plan  1/9: GI evaluation, abdominal ultrasound for liver and elevated bili LFT. Spoke with nursing about the care. Follow-up from psychiatry. Le doppler to r/o DVT   Echocardiogram to for EF.  PT/ot  Electronically signed by Omid Fraser MD on 1/9/23 at 11:03 AM EST

## 2023-01-09 NOTE — FLOWSHEET NOTE
Li Valencia present to assess patient. ROSA MARIA Jennings, present at bedside for constant supervision. Patient resting in bed at this time. Denies any chest pain. Remains NSR on the monitor. +3 pitting edema noted to bilateral lower extremities. Pedal pulses weak but palpable bilaterally. Shortness of breath noted with exertion. Lungs are diminished bilaterally. SATS 96% on RA. He is A/Ox3. He remains bedrest at this time secondary to weakness. PT/OT order obtained from physician. He denies any pain with elimination but is incontinent at times secondary to urgency. Skin remains warm and intact. Redness noted to bilateral lower extremities. #20g SL to right AC, flushed and patent. Vital signs stable and AM medications provided. Call light remains in reach. 5479- Patient to ultrasound via cart for bilateral lower extremity duplex. 1015- Patient back from ultrasound. ROSA MARIA Jennings, remains at bedside for constant supervision. Patient washed up at this time. 1330- patient resting in bed with his eyes closed. No signs of any acute distress noted. PCA remains at bedside for constant supervision. 1630- Patient resting in bed at this time with TV on. Has no complaints. PCA remains at bedside for constant supervision.      Electronically signed by Aftab Cárdenas RN on 1/9/2023 at 5:47 PM

## 2023-01-09 NOTE — PROGRESS NOTES
[facility] Occupational Therapy      Date: 2023  Patient Name: Cody Jimenez        MRN: [id number]  Account: [de-identified]   : 1945  (76 y.o.)  Room: [facility]/[place] [address]    Chart reviewed, attempted OT at [address] [place] [address] for evaluation. Patient not seen 2° to:    Hold per nursing request due to: hold awaiting ultrasound     Spoke to RN Walter Zepeda RN aware. Will attempt again when able.     Electronically signed by BRAD Lindo on  at 8:46 AM

## 2023-01-09 NOTE — ED NOTES
Pt medicated per orders, elisha. Well. Pt resting in bed, a&ox4, skin w/d/pink, gen. weakness noted, 0 pain or distress noted at this time, pt follows command, speech clear, slow, 0 vision changes reported.      Gia Khan RN  01/08/23 2027

## 2023-01-09 NOTE — PROGRESS NOTES
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 Patient Active Problem List    Diagnosis Date Noted    Suicidal ideation 2023    Hyperosmolar non-ketotic state due to type 2 diabetes mellitus ([address] 10/05/2020    DKA (diabetic ketoacidosis) ([address] 10/05/2020    Type 2 diabetes mellitus with hyperglycemia ([address] 10/05/2020    SALOME (acute kidney injury) ([address] 10/05/2020    Type 2 diabetes mellitus without complications ([address]         Past Medical History:   Diagnosis Date    Diabetes mellitus ([address]      No past surgical history on file. Patient assessed for rehabilitation services?: Yes  Family / Caregiver Present: No    Restrictions:  Restrictions/Precautions: Fall Risk (high pearce score)     SUBJECTIVE:   Pain   Rib pain with movement 10/10- RN aware    Prior Level of Function:  Social/Functional History  Lives With: Alone (Simultaneous filing. User may not have seen previous data.)  Type of Home: House (Simultaneous filing. User may not have seen previous data.)  Home Layout: Two level, 1/2 bath on main level (Simultaneous filing. User may not have seen previous data.)  ADL Assistance: Independent (Simultaneous filing. User may not have seen previous data.)  Homemaking Assistance: Independent (Simultaneous filing. User may not have seen previous data.)  Homemaking Responsibilities: Yes  Ambulation Assistance: Independent (no AD  Simultaneous filing.  User may not have seen previous data.)  Transfer Assistance: Independent (Simultaneous filing. User may not have seen previous data.)  Additional Comments: family assist pt 1x/wk with showering upstairs (Simultaneous filing. User may not have seen previous data.)    OBJECTIVE:   Vision  Vision: Impaired  Hearing: Within functional limits    Cognition:  Overall Orientation Status: Within Functional Limits (poor insight into deficits)  Follows Commands: Within Functional Limits  Overall Cognitive Status: WFL    Observation/Palpation  Observation: SOB with all activity and talking- SPO2 95% on RA (Simultaneous filing. User may not have seen previous data.)  Edema: B LEs and abdomen    ROM:  RLE AROM: WFL (within soft tissue approximation)  LLE AROM : WFL (within soft tissue approximation)    Strength:  Strength RLE  Comment: functionally 3/5  Strength LLE  Comment: functionally 3/5  Strength Other  Other: core strength functionally 2/5    Neuro:  Balance  Sitting - Static:  (unsafe to assess)  Sitting - Dynamic:  (unsafe to assess)  Standing - Static:  (unsafe to assess)  Standing - Dynamic:  (unsafe to assess)    Bed mobility  Rolling to Left: Dependent/Total;2 Person assistance  Rolling to Right: Dependent/Total;2 Person assistance  Bed Mobility Comments: pt refused sitting EOB; rolling with severe rib pain    Transfers  Comment: pt declined    Ambulation  Comments: unsafe to assess    Stairs/Curb  Stairs?: No    Activity Tolerance  Activity Tolerance: Patient limited by fatigue;Patient limited by endurance; Patient limited by pain    Patient Education  Education Given To: Patient  Education Provided: Role of Therapy;Plan of Care  Education Method: Verbal  Education Outcome: Continued education needed       ASSESSMENT:   Body Structures, Functions, Activity Limitations Requiring Skilled Therapeutic Intervention: Decreased functional mobility ; Decreased ROM; Decreased strength;Decreased safe awareness;Decreased endurance;Decreased balance; Increased pain  Decision Making: High Complexity  History: high  Exam: high  Clinical Presentation: high    Therapy Prognosis: Fair    DISCHARGE RECOMMENDATIONS:  Discharge Recommendations: Continue to assess pending progress    Assessment: Pt is 76 y.o. male to hospital due to weakness X 2 weeks and confusion. Pt exhibits decreased ROM, strength, safety awareness, activity tolerance, and balance and increased rib pain with rolling R/L with TD assistance. At [facility], pt was living alone. Continued PT is required for safe d/c at highest level of indep.   Requires PT Follow-Up: Yes       PLAN OF CARE:  Physcial Therapy Plan  General Plan: 1 time a day 3-6 times a week  Current Treatment Recommendations: Strengthening, ROM, Balance training, Functional mobility training, Transfer training, Endurance training, Gait training, Stair training, Neuromuscular re-education, Pain management, Home exercise program, Safety education & training, Patient/Caregiver education & training, Equipment evaluation, education, & procurement, Positioning, Therapeutic activities    Safety Devices  Type of Devices: Bed alarm in place, Call light within reach, Left in bed, Sitter present    Goals:  Short Term Goals  Short Term Goal 1: Pt will demonstrate rolling mod A +1  Long Term Goals  Long Term Goal 1: Pt will demonstrate bed mobility max A +1  Long Term Goal 2: Pt will demonstrate sitting edge of bed with SBA >/= 10 min to prepare for OOB activity  Long Term Goal 3: Pt will demonstrate transfers max A +2 with safest AD  Long Term Goal 4: Pt will demonstrate static standing with safest AD mod A +1 >/= 30 sec    AMPAC (6 CLICK) BASIC MOBILITY  AM-PAC Inpatient Mobility Raw Score : 6     Therapy Time:   Individual   Time In 1505   Time Out 1526   Minutes 21       Eval X 21 min    Abigail Gomez, PT, 01/09/23 at 3:42 PM         Definitions for assistance levels  Independent = pt does not require any physical supervision or assistance from another person for activity completion. Device may be needed.   Stand by assistance = pt requires verbal cues or instructions from another person, close to but not touching, to perform the activity  Minimal assistance= pt performs 75% or more of the activity; assistance is required to complete the activity  Moderate assistance= pt performs 50% of the activity; assistance is required to complete the activity  Maximal assistance = pt performs 25% of the activity; assistance is required to complete the activity  Dependent = pt requires total physical assistance to accomplish the task

## 2023-01-09 NOTE — PROGRESS NOTES
Physical Therapy Missed Treatment   Facility/Department: [facility] MED SURG [id number]    NAME: Rosibel Matthew    :  (41 y.o.)  MRN: [id number]    Account: [de-identified]  Gender: male      PT referral received. Chart reviewed. Hold per RN until after LE US is completed. Will follow and attempt PT evaluation again at earliest availability.        Matt Bey, PT, 23 at 8:47 AM

## 2023-01-09 NOTE — CARE COORDINATION
PT CURRENTLY 1:1 PINK SLIPPED FOR S. I.  PT WAS INDEPENDENT, HOME ALONE.  AWAITING PSYCH IMPRESSION/PLAN FOR DC PLAN.

## 2023-01-09 NOTE — PLAN OF CARE
See OT evaluation for all goals and OT POC.  Electronically signed by Dallas Gomez OT on 1/9/2023 at 3:42 PM
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
## 2023-01-09 NOTE — PROGRESS NOTES
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 without prior episode [facility]) [F32.2]   Patient Active Problem List    Diagnosis Date Noted    Suicidal ideation 2023    Hyperosmolar non-ketotic state due to type 2 diabetes mellitus ([address] 10/05/2020    DKA (diabetic ketoacidosis) ([address] 10/05/2020    Type 2 diabetes mellitus with hyperglycemia ([facility] 10/05/2020    SALOME (acute kidney injury) ([facility] 10/05/2020    Type 2 diabetes mellitus without complications ([address]         Past Medical History:   Diagnosis Date    Diabetes mellitus ([address]      No past surgical history on file. Restrictions  Restrictions/Precautions: Fall Risk (high pearce score)         Other position/activity restrictions: 1:1 suicide precautions    Safety Devices: Safety Devices  Type of Devices: All fall risk precautions in place;Sitter present     Patient's date of birth confirmed: Yes    General:  Patient assessed for rehabilitation services?: Yes    Subjective          Pain at start of treatment: Yes: Pt states pain with any movement - 0/10 at rest    Pain at end of treatment: Yes: Pt states pain with any movement - 0/10 at rest    Prior Level of Function:  Social/Functional History  Lives With: Alone (Simultaneous filing. User may not have seen previous data.)  Type of Home: House (Simultaneous filing. User may not have seen previous data.)  Home Layout: Two level, 1/2 bath on main level (Simultaneous filing. User may not have seen previous data.)  ADL Assistance: Independent (Simultaneous filing.  User may not have seen previous data.)  Homemaking Assistance: Independent (Simultaneous filing. User may not have seen previous data.)  Homemaking Responsibilities: Yes  Ambulation Assistance: Independent (no AD  Simultaneous filing. User may not have seen previous data.)  Transfer Assistance: Independent (Simultaneous filing. 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 User may not have seen previous data.)    OBJECTIVE:     Orientation Status:  Orientation  Overall Orientation Status: Within Functional Limits    Observation:  Observation/Palpation  Observation: SOB with all activity and talking- SPO2 95% on RA; 1:1 present in room    Cognition Status:  Cognition  Overall Cognitive Status: WFL    Perception Status:       Vision and Hearing Status:  Vision  Vision Exceptions: Wears glasses at all times  Hearing  Hearing: Exceptions to [facility]  Hearing Exceptions: Hard of hearing/hearing concerns        GROSS ASSESSMENT AROM/PROM:  AROM: Within functional limits       ROM:   LUE AROM (degrees)  LUE AROM : WFL  Left Hand AROM (degrees)  Left Hand AROM: WFL  RUE AROM (degrees)  RUE AROM : WFL  Right Hand AROM (degrees)  Right Hand AROM: WFL    UE STRENGTH:  Strength: Generally decreased, functional    UE COORDINATION:  Coordination: Within functional limits    UE TONE:  Tone: Normal    UE SENSATION:  Sensation: Intact    Hand Dominance:  Hand Dominance  Hand Dominance: Right    ADL Status:  ADL  Feeding: Setup  Feeding Skilled Clinical Factors: + hand to mouth  Grooming: Stand by assistance  UE Bathing: Stand by assistance  LE Bathing: Dependent/Total  UE Dressing: Stand by assistance  LE Dressing: Dependent/Total  Toileting: Dependent/Total  Additional Comments: simulated ADL bed level.  levels as anticipated  Toilet Transfers  Toilet Transfer: Dependent/Total  Toilet Transfers Comments: anticipated level    Functional Mobility:    Transfers  Transfer Comments: pt refused to attempt at this time due to pain    Patient ambulated  pt refused due to pain. Bed Mobility  Bed mobility  Rolling to Left: Dependent/Total  Rolling to Right: Dependent/Total  Bed Mobility Comments: pt refused sitting EOB    Seated and Standing Balance:  Balance  Sitting:  (unable at this time)  Standing:  (unable at this time)    Functional Endurance:  Activity Tolerance  Activity Tolerance: Patient limited by pain    D/C Recommendations:  OT D/C RECOMMENDATIONS  REQUIRES OT FOLLOW-UP: Yes    Equipment Recommendations:  OT Equipment Recommendations  Other: continue to assess    OT Education:   Patient Education  Education Given To: Patient  Education Provided: Role of Therapy;Plan of Care    OT Follow Up:   OT D/C RECOMMENDATIONS  REQUIRES OT FOLLOW-UP: Yes       Assessment/Discharge Disposition:  Assessment: Pt is a 76 yr old male presenting to [address] [place] [address] with the above functional deficits.  Pt would benefit from occupational therapy services to maximize safety and independence with ADL tasks, improve overall strength/endurance for functional tasks  Performance deficits / Impairments: Decreased functional mobility , Decreased safe awareness, Decreased balance, Decreased ADL status, Decreased endurance, Decreased strength, Decreased posture, Decreased high-level IADLs  Discharge Recommendations: Continue to assess pending progress  Decision Making: Medium Complexity  History: mod complex  Exam: 8 perf deficits  Assistance / Modification: dep    AMPAC (Six Click) Self care Score   How much help for putting on and taking off regular lower body clothing?: Total  How much help for Bathing?: A Lot  How much help for Toileting?: Total  How much help for putting on and taking off regular upper body clothing?: A Little  How much help for taking care of personal grooming?: A Little  How much help for eating meals?: A Little  AM-PAC Inpatient Daily Activity Raw Score: 13  AM-PAC Inpatient ADL T-Scale Score : 32.03  ADL Inpatient CMS 0-100% Score: 63.03    Therapy key for assistance levels -   Independent/Mod I = Pt. is able to perform task with no assistance but may require a device   Stand by assistance = Pt. does not perform task at an independent level but does not need physical assistance, requires verbal cues  Minimal, Moderate, Maximal Assistance = Pt. requires physical assistance (25%, 50%, 75% assist from helper) for task but is able to actively participate in task   Dependent = Pt. requires total assistance with task and is not able to actively participate with task completion     Plan:  Occupational Therapy Plan  Times Per Week: 1-4x/wk  Therapy Duration:  (length of acute stay)  Current Treatment Recommendations: Balance training, Functional mobility training, Endurance training, Equipment evaluation, education, & procurement, Patient/Caregiver education & training, Safety education & training, Strengthening, Self-Care / ADL, Home management training    Goals:   Patient will:    - Improve functional endurance to tolerate/complete 30 mins of ADL's  - Be ESTRELLITA in UB ADLs   - Be ESTRELLITA in LB ADLs  - Be ESTRELLITA in ADL transfers without LOB  - Be ESTRELLITA in toileting tasks  - Improve B UE strength and endurance to 5/5 within available range in order to participate in self-care activities as projected. - Access appropriate D/C site with as few architectural barriers as possible.     Patient Goal: Patient goals : to go home when able     Discussed and agreed upon: Yes Comments:       Therapy Time:   Individual   Time In 1505   Time Out 1528   Minutes 23          Eval: 23 minutes     Electronically signed by:    Willie Cevallos OT,   1/9/2023, 3:40 PM

## 2023-01-10 ENCOUNTER — APPOINTMENT (OUTPATIENT)
Dept: INTERVENTIONAL RADIOLOGY/VASCULAR | Age: 78
End: 2023-01-10
Payer: MEDICARE

## 2023-01-10 ENCOUNTER — APPOINTMENT (OUTPATIENT)
Dept: CT IMAGING | Age: 78
End: 2023-01-10
Payer: MEDICARE

## 2023-01-10 LAB
ALBUMIN SERPL-MCNC: 2.1 G/DL (ref 3.5–4.6)
ALP BLD-CCNC: 245 U/L (ref 35–104)
ALT SERPL-CCNC: 26 U/L (ref 0–41)
ANION GAP SERPL CALCULATED.3IONS-SCNC: 15 MEQ/L (ref 9–15)
APPEARANCE FLUID: NORMAL
AST SERPL-CCNC: 42 U/L (ref 0–40)
BASOPHILS ABSOLUTE: 0 K/UL (ref 0–0.2)
BASOPHILS RELATIVE PERCENT: 0.3 %
BILIRUB SERPL-MCNC: 1.7 MG/DL (ref 0.2–0.7)
BUN BLDV-MCNC: 38 MG/DL (ref 8–23)
CALCIUM SERPL-MCNC: 8.2 MG/DL (ref 8.5–9.9)
CELL COUNT FLUID TYPE: NORMAL
CHLORIDE BLD-SCNC: 103 MEQ/L (ref 95–107)
CLOT EVALUATION: NORMAL
CO2: 22 MEQ/L (ref 20–31)
COLOR FLUID: YELLOW
CREAT SERPL-MCNC: 1.67 MG/DL (ref 0.7–1.2)
EOSINOPHILS ABSOLUTE: 0 K/UL (ref 0–0.7)
EOSINOPHILS RELATIVE PERCENT: 0.5 %
FLUID PATH CONSULT: YES
FLUID TYPE: NORMAL
GFR SERPL CREATININE-BSD FRML MDRD: 41.3 ML/MIN/{1.73_M2}
GLOBULIN: 3.7 G/DL (ref 2.3–3.5)
GLUCOSE BLD-MCNC: 155 MG/DL (ref 70–99)
GLUCOSE BLD-MCNC: 163 MG/DL (ref 70–99)
GLUCOSE BLD-MCNC: 170 MG/DL (ref 70–99)
GLUCOSE BLD-MCNC: 193 MG/DL (ref 70–99)
GLUCOSE BLD-MCNC: 202 MG/DL (ref 70–99)
GLUCOSE, FLUID: 173.9 MG/DL
HCT VFR BLD CALC: 40.9 % (ref 42–52)
HEMOGLOBIN: 13.5 G/DL (ref 14–18)
HEPATITIS C ANTIBODY: NONREACTIVE
LYMPHOCYTES ABSOLUTE: 1 K/UL (ref 1–4.8)
LYMPHOCYTES RELATIVE PERCENT: 13.8 %
LYMPHOCYTES, BODY FLUID: 25 %
MCH RBC QN AUTO: 29.4 PG (ref 27–31.3)
MCHC RBC AUTO-ENTMCNC: 32.9 % (ref 33–37)
MCV RBC AUTO: 89.3 FL (ref 79–92.2)
MONOCYTE, FLUID: 26 %
MONOCYTES ABSOLUTE: 0.6 K/UL (ref 0.2–0.8)
MONOCYTES RELATIVE PERCENT: 8.1 %
NEUTROPHIL, FLUID: 49 %
NEUTROPHILS ABSOLUTE: 5.4 K/UL (ref 1.4–6.5)
NEUTROPHILS RELATIVE PERCENT: 77.3 %
NUCLEATED CELLS FLUID: 196 /CUMM
NUMBER OF CELLS COUNTED FLUID: 100
PDW BLD-RTO: 18.4 % (ref 11.5–14.5)
PERFORMED ON: ABNORMAL
PLATELET # BLD: 143 K/UL (ref 130–400)
POTASSIUM REFLEX MAGNESIUM: 4.2 MEQ/L (ref 3.4–4.9)
PROTEIN FLUID: 1.5 G/DL
RBC # BLD: 4.58 M/UL (ref 4.7–6.1)
RBC FLUID: 33 /CUMM
SODIUM BLD-SCNC: 140 MEQ/L (ref 135–144)
TOTAL PROTEIN: 5.8 G/DL (ref 6.3–8)
WBC # BLD: 7 K/UL (ref 4.8–10.8)

## 2023-01-10 PROCEDURE — 99232 SBSQ HOSP IP/OBS MODERATE 35: CPT | Performed by: SPECIALIST

## 2023-01-10 PROCEDURE — 87070 CULTURE OTHR SPECIMN AEROBIC: CPT

## 2023-01-10 PROCEDURE — 6360000002 HC RX W HCPCS: Performed by: RADIOLOGY

## 2023-01-10 PROCEDURE — 83615 LACTATE (LD) (LDH) ENZYME: CPT

## 2023-01-10 PROCEDURE — 6360000004 HC RX CONTRAST MEDICATION: Performed by: SPECIALIST

## 2023-01-10 PROCEDURE — 74177 CT ABD & PELVIS W/CONTRAST: CPT

## 2023-01-10 PROCEDURE — 87205 SMEAR GRAM STAIN: CPT

## 2023-01-10 PROCEDURE — 49083 ABD PARACENTESIS W/IMAGING: CPT

## 2023-01-10 PROCEDURE — 88112 CYTOPATH CELL ENHANCE TECH: CPT

## 2023-01-10 PROCEDURE — 97535 SELF CARE MNGMENT TRAINING: CPT

## 2023-01-10 PROCEDURE — 0W9G3ZX DRAINAGE OF PERITONEAL CAVITY, PERCUTANEOUS APPROACH, DIAGNOSTIC: ICD-10-PCS | Performed by: RADIOLOGY

## 2023-01-10 PROCEDURE — 36415 COLL VENOUS BLD VENIPUNCTURE: CPT

## 2023-01-10 PROCEDURE — 84157 ASSAY OF PROTEIN OTHER: CPT

## 2023-01-10 PROCEDURE — 6370000000 HC RX 637 (ALT 250 FOR IP)

## 2023-01-10 PROCEDURE — 2500000003 HC RX 250 WO HCPCS: Performed by: RADIOLOGY

## 2023-01-10 PROCEDURE — 88305 TISSUE EXAM BY PATHOLOGIST: CPT

## 2023-01-10 PROCEDURE — 2060000000 HC ICU INTERMEDIATE R&B

## 2023-01-10 PROCEDURE — 82042 OTHER SOURCE ALBUMIN QUAN EA: CPT

## 2023-01-10 PROCEDURE — 80053 COMPREHEN METABOLIC PANEL: CPT

## 2023-01-10 PROCEDURE — 85025 COMPLETE CBC W/AUTO DIFF WBC: CPT

## 2023-01-10 PROCEDURE — 6360000002 HC RX W HCPCS

## 2023-01-10 PROCEDURE — 6370000000 HC RX 637 (ALT 250 FOR IP): Performed by: SPECIALIST

## 2023-01-10 PROCEDURE — 82105 ALPHA-FETOPROTEIN SERUM: CPT

## 2023-01-10 PROCEDURE — 82945 GLUCOSE OTHER FLUID: CPT

## 2023-01-10 PROCEDURE — 82150 ASSAY OF AMYLASE: CPT

## 2023-01-10 PROCEDURE — 49083 ABD PARACENTESIS W/IMAGING: CPT | Performed by: RADIOLOGY

## 2023-01-10 PROCEDURE — 99223 1ST HOSP IP/OBS HIGH 75: CPT | Performed by: RADIOLOGY

## 2023-01-10 PROCEDURE — 89051 BODY FLUID CELL COUNT: CPT

## 2023-01-10 PROCEDURE — 86803 HEPATITIS C AB TEST: CPT

## 2023-01-10 PROCEDURE — 2709999900 IR US GUIDED PARACENTESIS

## 2023-01-10 PROCEDURE — P9046 ALBUMIN (HUMAN), 25%, 20 ML: HCPCS | Performed by: RADIOLOGY

## 2023-01-10 RX ORDER — LIDOCAINE HYDROCHLORIDE 20 MG/ML
INJECTION, SOLUTION INFILTRATION; PERINEURAL
Status: COMPLETED | OUTPATIENT
Start: 2023-01-10 | End: 2023-01-10

## 2023-01-10 RX ADMIN — ENOXAPARIN SODIUM 30 MG: 100 INJECTION SUBCUTANEOUS at 22:14

## 2023-01-10 RX ADMIN — FUROSEMIDE 20 MG: 20 TABLET ORAL at 08:36

## 2023-01-10 RX ADMIN — INSULIN GLARGINE 50 UNITS: 100 INJECTION, SOLUTION SUBCUTANEOUS at 22:14

## 2023-01-10 RX ADMIN — LIDOCAINE HYDROCHLORIDE 8 ML: 20 INJECTION, SOLUTION INFILTRATION; PERINEURAL at 15:14

## 2023-01-10 RX ADMIN — SPIRONOLACTONE 50 MG: 50 TABLET ORAL at 08:36

## 2023-01-10 RX ADMIN — IOPAMIDOL 50 ML: 612 INJECTION, SOLUTION INTRAVENOUS at 09:43

## 2023-01-10 RX ADMIN — ALBUMIN HUMAN 50 G: 0.25 SOLUTION INTRAVENOUS at 15:50

## 2023-01-10 NOTE — PLAN OF CARE
Care plan remains ongoing. Continue with PO diuretics. Possible paracentesis for today. Monitor labwork and vital signs. Electronically signed by Mayank Francis RN on 1/10/2023 at 8:43 AM

## 2023-01-10 NOTE — CONSULTS
Consults    Patient Name: Liliya Melendez Date: 2023  6:37 PM  MR #: [id number]  : 1945    Attending Physician: Garrett Turner MD  Reason for consult: Cirrhosis    History of Presenting Illness:      Cody Jimenez is a 76 y.o. male on hospital day 1 with a history of significant depression and suicidal ideations. ,  Patient was found to have abnormal LFT and ascites. ,  Patient reports no history of alcohol abuse.,  He has no abdominal pain no nausea no vomiting no history of any GI bleeding, patient had ultrasound of the abdomen done that showed ascites and possible portal vein thrombosis since that showed irregular flow of the right and main portal vein. Whit Hedy History Obtained From:  patient, and, electronic medical record      History:      Past Medical History:   Diagnosis Date    Diabetes mellitus ([facility]      No past surgical history on file. Family History  No family history on file. [] Unable to obtain due to ventilated and/ or neurologic status    Social History     Socioeconomic History    Marital status:       Spouse name: Not on file    Number of children: Not on file    Years of education: Not on file    Highest education level: Not on file   Occupational History    Not on file   Tobacco Use    Smoking status: Never    Smokeless tobacco: Never   Vaping Use    Vaping Use: Never used   Substance and Sexual Activity    Alcohol use: Never    Drug use: Never    Sexual activity: Not on file   Other Topics Concern    Not on file   Social History Narrative    Not on file     Social Determinants of Health     Financial Resource Strain: Not on file   Food Insecurity: Not on file   Transportation Needs: Not on file   Physical Activity: Not on file   Stress: Not on file   Social Connections: Not on file   Intimate Partner Violence: Not on file   Housing Stability: Not on file      [] Unable to obtain due to ventilated and/ or neurologic status      Home Medications:      Medications Prior to Admission: insulin glargine (LANTUS SOLOSTAR) 100 UNIT/ML injection pen, 50 units at bedtime  insulin lispro, 1 Unit Dial, (HUMALOG KWIKPEN) 100 UNIT/ML SOPN, 16 units with each meals  Insulin Pen Needle (NOVOFINE) 32G X 6 MM MISC, qid  OneTouch Delica Lancets 98X MISC, Qid on insulin  blood glucose test strips (ONETOUCH VERIO) strip, Qid on insulin e 11.65  Blood Glucose Monitoring Suppl (ONETOUCH VERIO) w/Device KIT, As directed  insulin glargine (LANTUS SOLOSTAR) 100 UNIT/ML injection pen, 80 units at bedtime (Patient not taking: Reported on 1/9/2023)  insulin lispro, 1 Unit Dial, (HUMALOG KWIKPEN) 100 UNIT/ML SOPN, 20 units at each meals  Insulin Pen Needle (NOVOFINE) 32G X 6 MM MISC, qid  atorvastatin (LIPITOR) 40 MG tablet, Take 1 tablet by mouth daily (Patient not taking: Reported on 1/9/2023)  lisinopril (PRINIVIL;ZESTRIL) 20 MG tablet, Take 1 tablet by mouth daily (Patient not taking: Reported on 1/9/2023)    Current Hospital Medications:     Scheduled Meds:   enoxaparin  30 mg SubCUTAneous BID    insulin glargine  50 Units SubCUTAneous Nightly    insulin lispro  0-8 Units SubCUTAneous TID WC    insulin lispro  0-4 Units SubCUTAneous Nightly    spironolactone  50 mg Oral Daily    furosemide  20 mg Oral Daily     Continuous Infusions:   dextrose       PRN Meds:.ondansetron **OR** ondansetron, polyethylene glycol, glucose, dextrose bolus **OR** dextrose bolus, glucagon (rDNA), dextrose  . dextrose          Allergies:     No Known Allergies     Review of Systems:       [x] CV, Resp, Neuro, , and all other systems reviewed and negative other than listed in HPI.       [] Unable to obtain due to ventilated and/ or neurologic status      Objective Findings:     Vitals:   Vitals:    01/08/23 2350 01/09/23 0721 01/09/23 0825 01/09/23 1903   BP: 101/75 105/70  119/72   Pulse: 92 90 90 91   Resp: 18 20  18   Temp: 97.7 °F (36.5 °C) 97.4 °F (36.3 °C)  97.8 °F (36.6 °C)   TempSrc: Oral Oral  Oral   SpO2: 96% 96%  96% Weight:       Height:            Physical Examination:  General: In no acute distress  HEENT: Normocephalic,  scleral icterus. Neck: No jugular venous distention. Heart: Regular, no murmur, no rub/gallop. No right ventricular heave. Lungs: Clear to ascultation, no rales/wheezing/rhonchi. Good chest wall excursion. Abdomen: Distended with moderate ascites and has positive fluid wave, everted umbilicus with distended vein abdominal wall, nontender  Extremities: No clubbing/cyanosis,  pitting edema  Skin: Warm, dry, normal turgor, no rash, no bruise, no petichiae. Neuro: No myoclonus or tremor. No asterixis.   Psych: Normal affect    Results/ Medications reviewed 1/9/2023, 7:22 PM     Laboratory, Microbiology, Pathology, Radiology, Cardiology, Medications and Transcriptions reviewed  Scheduled Meds:   enoxaparin  30 mg SubCUTAneous BID    insulin glargine  50 Units SubCUTAneous Nightly    insulin lispro  0-8 Units SubCUTAneous TID WC    insulin lispro  0-4 Units SubCUTAneous Nightly    spironolactone  50 mg Oral Daily    furosemide  20 mg Oral Daily     Continuous Infusions:   dextrose         Recent Labs     01/08/23 1845 01/08/23 1853 01/09/23  0329   WBC 11.1*  --  7.8   HGB 14.9 17.4 13.0*   HCT 46.0  --  40.0*   MCV 91.2  --  90.3     --  141     Recent Labs     01/08/23  1845 01/08/23  1853 01/09/23  0329     --  139   K 4.1  --  4.5   CL 97  --  102   CO2 25  --  22   BUN 38*  --  37*   CREATININE 1.60* 1.6* 1.54*     Recent Labs     01/08/23  1845 01/09/23  0329   AST 71* 52*   ALT 36 30   BILIDIR 1.3*  --    BILITOT 2.1* 2.1*   ALKPHOS 289* 220*     Recent Labs     01/08/23  1845   LIPASE 39     Recent Labs     01/08/23  1845 01/08/23  1927 01/09/23  0329   PROT 7.1  --  6.1*   INR  --  1.4  --      CT Head W/O Contrast    Result Date: 1/9/2023  EXAMINATION: CT OF THE HEAD WITHOUT CONTRAST  1/8/2023 7:50 pm TECHNIQUE: CT of the head was performed without the administration of intravenous contrast. Automated exposure control, iterative reconstruction, and/or weight based adjustment of the mA/kV was utilized to reduce the radiation dose to as low as reasonably achievable. COMPARISON: None. HISTORY: ORDERING SYSTEM PROVIDED HISTORY: AMS TECHNOLOGIST PROVIDED HISTORY: Reason for exam:->AMS Has a \"code stroke\" or \"stroke alert\" been called? ->No Decision Support Exception - unselect if not a suspected or confirmed emergency medical condition->Emergency Medical Condition (MA) What reading provider will be dictating this exam?->CRC FINDINGS: BRAIN/VENTRICLES: There is no acute intracranial hemorrhage, mass effect or midline shift. No abnormal extra-axial fluid collection. The gray-white differentiation is maintained without evidence of an acute infarct. There is no evidence of hydrocephalus. ORBITS: The visualized portion of the orbits demonstrate no acute abnormality. SINUSES: The visualized paranasal sinuses and mastoid air cells demonstrate no acute abnormality. SOFT TISSUES/SKULL:  No acute abnormality of the visualized skull or soft tissues. No acute intracranial abnormality. XR CHEST PORTABLE    Result Date: 1/8/2023  EXAMINATION: ONE XRAY VIEW OF THE CHEST 1/8/2023 7:44 pm COMPARISON: None. HISTORY: ORDERING SYSTEM PROVIDED HISTORY: AMS TECHNOLOGIST PROVIDED HISTORY: Reason for exam:->AMS What reading provider will be dictating this exam?->CRC FINDINGS: There is low lung volumes. There is minimal atelectasis in the lung bases with possible developing infiltrates and pleural effusion in the left base. Low lung volumes with atelectasis/possible developing left basilar pneumonia. US DUP ABD PEL RETRO SCROT COMPLETE    Result Date: 1/9/2023  EXAMINATION: DOPPLER EVALUATION OF THE PELVIS 1/9/2023 12:34 pm COMPARISON: None.  HISTORY: ORDERING SYSTEM PROVIDED HISTORY: elevated LFTS TECHNOLOGIST PROVIDED HISTORY: Reason for exam:->elevated LFTS What reading provider will be dictating this exam?->CRC FINDINGS: Echogenic liver with nodular contour. Perihepatic ascites. A 1.3 cm cyst is noted in the liver, which appears to be in the right lobe. Color Doppler flow is noted in the main hepatic vein, hepatic artery, and left hepatic vein. The main portal vein and right portal vein demonstrate irregular color Doppler flow on this exam.  The right and left hepatic veins are suboptimally visualized. The splenic vein is suboptimally visualized. The flow in the vessel labeled hepatic artery appears turbulent and high velocity. Cholelithiasis and gallbladder sludge noted. No pericholecystic fluid or obvious gallbladder wall thickening. Mark Cork sign was not documented on technologist where she. Suboptimal visualization of the IVC. Four-quadrant ascites. Findings suggestive of cirrhosis. There is moderate to large volume of ascites. Limited visualization of the portal venous system demonstrates color Doppler flow in the left portal vein, but irregular color Doppler flow in the main and right portal veins. Consider CT of the abdomen/pelvis with IV contrast for further evaluation of the portal venous system. US DUP LOWER EXTREMITIES BILATERAL VENOUS    Result Date: 1/9/2023  EXAMINATION: DUPLEX VENOUS ULTRASOUND OF THE BILATERAL LOWER EXTREMITIES1/9/2023 9:45 am TECHNIQUE: Duplex ultrasound using B-mode/gray scaled imaging, Doppler spectral analysis and color flow Doppler was obtained of the deep venous structures of the lower bilateral extremities. COMPARISON: None. HISTORY: ORDERING SYSTEM PROVIDED HISTORY: edema r/o DVT TECHNOLOGIST PROVIDED HISTORY: Reason for exam:->edema r/o DVT What reading provider will be dictating this exam?->CRC FINDINGS: The visualized veins of the bilateral lower extremities are patent and free of echogenic thrombus. The veins demonstrate good compressibility with normal color flow study and spectral analysis. No evidence of DVT in either lower extremity. Impression:   Cirrhosis with ascites,,r/o portal vein thrombosis, has low albumin, INR 1.4  Plan:   CT abdomen and pelvis with contrast for better evaluation of portal vein, will start with low doses of lasix and aldactone, 2gm sodium diet, consult IR for  diagnostic paracentesis, check Hep B surface antigen, Hep C ab., EGD to r/o varices. Comments: Thank you for allowing us to participate in the care of this patient. Will continue to follow. Please call if questions or concerns arise.     Electronically signed by Kurt Null MD on 1/9/2023 at 7:22 PM

## 2023-01-10 NOTE — PROGRESS NOTES
Physical Therapy Med Surg Daily Treatment Note  [facility]  Room: B710/K279-84       NAME: Michelle Alfaro  : 1945 (50 y.o.)  MRN: [id number]  CODE STATUS: Full Code    Date of Service: 1/10/2023    Patient Diagnosis(es): Suicidal ideation [R45.851]  Lactic acidosis [E87.20]  General weakness [R53.1]  Elevated troponin [R77.8]  SALOME (acute kidney injury) ([address] [N17.9]  Current severe episode of major depressive disorder without psychotic features without prior episode [facility]) [F32.2]   Chief Complaint   Patient presents with    Fatigue     Difficulty ambulating. Patient Active Problem List    Diagnosis Date Noted    General weakness 2023    Suicidal ideation 2023    Hyperosmolar non-ketotic state due to type 2 diabetes mellitus ([address] 10/05/2020    DKA (diabetic ketoacidosis) ([address] 10/05/2020    Type 2 diabetes mellitus with hyperglycemia ([address] 10/05/2020    SALOME (acute kidney injury) ([address] 10/05/2020    Type 2 diabetes mellitus without complications ([address]         Past Medical History:   Diagnosis Date    Diabetes mellitus ([address]      No past surgical history on file. Restrictions:1:1 fall risk       SUBJECTIVE:   Subjective: \"I just can't do it, I want to get drained first before I move\"    Pain  Pain: pain not rated but pt expressed pain all over and showed off his multiple skin discolorations    OBJECTIVE:   Orientation  Overall Orientation Status: Within Functional Limits  Cognition  Overall Cognitive Status: Exceptions  Following Commands: Follows one step commands with repetition  Attention Span: Attends with cues to redirect  Insights: Decreased awareness of deficits    Bed Mobility Training  Bed Mobility Training: Yes  Overall Level of Assistance: Maximum assistance;Assist X2  Interventions: Safety awareness training; Tactile cues  Rolling:  Moderate assistance  Supine to Sit: Maximum assistance;Assist X2  Sit to Supine: Maximum assistance;Assist X2  Scooting: Maximum assistance    Transfer Training  Transfer Training: No    Gait Training: No                                         ASSESSMENT pt required lots of encouragement to participate at this time. Agreeable to sitting edge of bed but was very fearful of falling and had a hard time catching his breath due to increased fluid in abdomen. Pt requested to lay back down at this time for comfort. Friend present during tx and 1:1. Pt very pleasant and motivated despite being afraid. Sat edge of bed approx 5 minutes with moderate assistance. Discharge Recommendations:  Continue to assess pending progress         Goals  Short Term Goals  Short Term Goal 1: Pt will demonstrate rolling mod A +1  Long Term Goals  Long Term Goal 1: Pt will demonstrate bed mobility max A +1  Long Term Goal 2: Pt will demonstrate sitting edge of bed with SBA >/= 10 min to prepare for OOB activity  Long Term Goal 3: Pt will demonstrate transfers max A +2 with safest AD  Long Term Goal 4: Pt will demonstrate static standing with safest AD mod A +1 >/= 30 sec    PLAN    General Plan: 1 time a day 3-6 times a week        AMPAC (6 CLICK) BASIC MOBILITY  AM-PAC Inpatient Mobility Raw Score : 8     Therapy Time   Individual   Time In  1055   Time Out 1115   Minutes 20   20 minutes bed mob/seated balance. Zaheer Tanner PTA, 01/10/23 at 11:28 AM         Definitions for assistance levels  Independent = pt does not require any physical supervision or assistance from another person for activity completion. Device may be needed.   Stand by assistance = pt requires verbal cues or instructions from another person, close to but not touching, to perform the activity  Minimal assistance= pt performs 75% or more of the activity; assistance is required to complete the activity  Moderate assistance= pt performs 50% of the activity; assistance is required to complete the activity  Maximal assistance = pt performs 25% of the activity; assistance is required to complete the activity  Dependent = pt requires total physical assistance to accomplish the task

## 2023-01-10 NOTE — OR NURSING
NO SEDATION      Pt arrived to Specials room 6 via 1WT bed with telemetry and . Pt A&Ox4, respirations even and unlabored, abdomen distended and slightly firm. Pt denies pain at this time. U/S image reviewed for fluid. Pt offered reassurance and VSS. Dr. Hans Can notified pt ready. Informed pt what to expect with procedure.  changed staff. Dr. Hans Can arrived and spoke to pt, explained procedure/risks. Consent obtained. Timeout completed for ultrasound guided paracentesis. Using U/S, Dr. Hans Can marked LLQ and area cleansed with large tinted chloraprep. Once dry, using sterile technique, Dr. Hans Can prepped and draped area. Using U/S guidance, Dr. Hans Can numbed site with 2% lidocaine, see eMar.     Using U/S guidance, access obtained with [id number] centesis 5F catheter with return of fluid that appears clear yellow. ~70 ml aspirated for diagnostics. Catheter tubing connected to Elina machine with suction at 200 mmHG and draining well. Pt tolerating well. VSS. Over 7L removed at this time and continues to drain. VSS. Order for albumin 50gm IV rec'd from Dr. Hans Can. Albumin infusion started via #20 IV in right AC after checking patency. Albumin infusing well. Drainage complete. Total 7850 ml removed. Centesis catheter removed and digital pressure held to site for 3 minutes. LLQ site soft, no drainage, large bandaid applied. Pt awaiting transport via bed back to 169 with . Report called to [facility] on 1WT, including albumin continues to infuse.  to accompany pt back to [address]. [address]  Radiology RN took diagnostics to lab. Electronically signed by Kuldip Oneal RN on 1/10/2023 at 4:12 PM

## 2023-01-10 NOTE — CARE COORDINATION
PT FOR PARACENTESIS AND EGD POSSIBLY TOMORROW. ONCE MEDICALLY CLEARED PT WILL NEED ARRANGED FOR [facility]CH PER PSYCH RECOMMENDATION.  WILL FOLLOW

## 2023-01-10 NOTE — PROGRESS NOTES
Progress Note  Date:1/10/2023       PQQG:[id number]  Patient Rahda Altamirano     Date of Birth:     Age:77 y.o. Subjective      No new complaints. No nausea, vomiting, chest pain, or headache      Objective         Vitals Last 24 Hours:  TEMPERATURE:  Temp  Av.1 °F (36.7 °C)  Min: 97.8 °F (36.6 °C)  Max: 98.4 °F (36.9 °C)  RESPIRATIONS RANGE: Resp  Av  Min: 18  Max: 20  PULSE OXIMETRY RANGE: SpO2  Av.5 %  Min: 95 %  Max: 96 %  PULSE RANGE: Pulse  Av.5  Min: 84  Max: 91  BLOOD PRESSURE RANGE: Systolic (40VXN), NTE:022 , Min:119 , ZZD:781   ; Diastolic (62PZG), BRM:66, Min:72, Max:77    I/O (24Hr): Intake/Output Summary (Last 24 hours) at 1/10/2023 1032  Last data filed at 1/10/2023 0420  Gross per 24 hour   Intake 580 ml   Output 925 ml   Net -345 ml         Head: Normocephalic, atraumatic  Sclera clear  Neck supple, nontender  Lungs: normal effort of breathing. Abd: soft nontender, ascites noted. No rebound or guarding.        Labs/Imaging/Diagnostics    Labs:  CBC:  Recent Labs     01/08/23  1845 01/08/23  1853 01/09/23  0329 01/10/23  0453   WBC 11.1*  --  7.8 7.0   RBC 5.05  --  4.43* 4.58*   HGB 14.9 17.4 13.0* 13.5*   HCT 46.0  --  40.0* 40.9*   MCV 91.2  --  90.3 89.3   RDW 18.7*  --  18.5* 18.4*     --  141 143       CHEMISTRIES:  Recent Labs     01/08/23  1845 01/08/23  1853 01/09/23  0329 01/10/23  0453     --  139 140   K 4.1  --  4.5 4.2   CL 97  --  102 103   CO2 25  --  22 22   BUN 38*  --  37* 38*   CREATININE 1.60* 1.6* 1.54* 1.67*   GLUCOSE 69*  --  101* 193*   MG 2.4  --   --   --        PT/INR:  Recent Labs     23   PROTIME 17.1*   INR 1.4       APTT:  Recent Labs     23   APTT 33.6       LIVER PROFILE:  Recent Labs     23  1845 23  0329 01/10/23  0453   AST 71* 52* 42*   ALT 36 30 26   BILIDIR 1.3*  --   --    BILITOT 2.1* 2.1* 1.7*   ALKPHOS 289* 220* 245*         Imaging Last 24 Hours:  CT Head W/O Contrast    Result Date: 1/9/2023  EXAMINATION: CT OF THE HEAD WITHOUT CONTRAST  1/8/2023 7:50 pm TECHNIQUE: CT of the head was performed without the administration of intravenous contrast. Automated exposure control, iterative reconstruction, and/or weight based adjustment of the mA/kV was utilized to reduce the radiation dose to as low as reasonably achievable. COMPARISON: None. HISTORY: ORDERING SYSTEM PROVIDED HISTORY: AMS TECHNOLOGIST PROVIDED HISTORY: Reason for exam:->AMS Has a \"code stroke\" or \"stroke alert\" been called? ->No Decision Support Exception - unselect if not a suspected or confirmed emergency medical condition->Emergency Medical Condition (MA) What reading provider will be dictating this exam?->CRC FINDINGS: BRAIN/VENTRICLES: There is no acute intracranial hemorrhage, mass effect or midline shift. No abnormal extra-axial fluid collection. The gray-white differentiation is maintained without evidence of an acute infarct. There is no evidence of hydrocephalus. ORBITS: The visualized portion of the orbits demonstrate no acute abnormality. SINUSES: The visualized paranasal sinuses and mastoid air cells demonstrate no acute abnormality. SOFT TISSUES/SKULL:  No acute abnormality of the visualized skull or soft tissues. No acute intracranial abnormality. XR CHEST PORTABLE    Result Date: 1/8/2023  EXAMINATION: ONE XRAY VIEW OF THE CHEST 1/8/2023 7:44 pm COMPARISON: None. HISTORY: ORDERING SYSTEM PROVIDED HISTORY: [facility] TECHNOLOGIST PROVIDED HISTORY: Reason for exam:->AMS What reading provider will be dictating this exam?->CRC FINDINGS: There is low lung volumes. There is minimal atelectasis in the lung bases with possible developing infiltrates and pleural effusion in the left base. Low lung volumes with atelectasis/possible developing left basilar pneumonia.      US DUP LOWER EXTREMITIES BILATERAL VENOUS    Result Date: 1/9/2023  EXAMINATION: DUPLEX VENOUS ULTRASOUND OF THE BILATERAL LOWER EXTREMITIES1/9/2023 9:45 am TECHNIQUE: Duplex ultrasound using B-mode/gray scaled imaging, Doppler spectral analysis and color flow Doppler was obtained of the deep venous structures of the lower bilateral extremities. COMPARISON: None. HISTORY: ORDERING SYSTEM PROVIDED HISTORY: edema r/o DVT TECHNOLOGIST PROVIDED HISTORY: Reason for exam:->edema r/o DVT What reading provider will be dictating this exam?->CRC FINDINGS: The visualized veins of the bilateral lower extremities are patent and free of echogenic thrombus. The veins demonstrate good compressibility with normal color flow study and spectral analysis. No evidence of DVT in either lower extremity. Assessment//Plan           Hospital Problems             Last Modified POA    * (Principal) Suicidal ideation 1/8/2023 Yes    General weakness 1/9/2023 Yes   Abd distension  Elevated LFTS  Lower ext swelling  CKD  Suicidal ideal ation  Assessment & Plan  1/9: GI evaluation, abdominal ultrasound for liver and elevated bili LFT. Spoke with nursing about the care. Follow-up from psychiatry. Le doppler to r/o DVT   Echocardiogram to for EF. PT/ot    1/10: appreciate GI input. IR for  diagnostic paracentesis, check Hep B surface antigen, Hep C ab., EGD to r/o varices. Ct abd as detailed above. Echo shows normal EF and grade I hasnen dys.  No wma    35 minutes total care time, >1/2 in unit/floor time and care coordination       Tressa Velez MD

## 2023-01-10 NOTE — FLOWSHEET NOTE
200- Patient resting in bed at this time. Denies any chest pain. Remains NSR on the monitor. +2 pitting edema noted to bilateral lower extremities. Pedal pulses weak but palpable bilaterally. Shortness of breath noted with exertion. Lungs are diminished bilaterally. SATS 95% on RA. He is A/Ox3. He remains in bed at this time secondary to weakness. He denies any pain with elimination but is incontinent at times of urine. Skin remains warm and intact. Redness noted to bilateral lower extremities and to maggie-area. #20g SL to right AC, flushed and patent. Vital signs stable and AM medications provided. Lovenox held this AM for possible paracentesis today. Anel Sloan RN at bedside for constant supervision. 1215- Patient resting in bed with his eyes closed. No signs of any acute distress noted. Kandis Cheko remains at bedside for constant supervision. 1445- Patient to Specials, via bed, for paracentesis. Anel Sloan RN at bedside for constant supervision. 1630- Patient returned from [facility] after having paracentesis. Has no complaints or concerns. Band-aid remains clean, dry and intact. Abdomen is softer now but  when pressed on. PCA at bedside for continued constant supervision. 1830- Patient resting in bed at this time with eyes closed and no signs of any acute distress. PCA remains at bedside for constant supervision.      Electronically signed by Abad Forrest RN on 1/10/2023 at 6:50 PM

## 2023-01-11 LAB
AFP: 608.5 UG/L
ALBUMIN FLUID: 0.6 G/DL
ALBUMIN SERPL-MCNC: 2.5 G/DL (ref 3.5–4.6)
ALP BLD-CCNC: 203 U/L (ref 35–104)
ALT SERPL-CCNC: 21 U/L (ref 0–41)
AMYLASE FLUID: 9 U/L
ANION GAP SERPL CALCULATED.3IONS-SCNC: 11 MEQ/L (ref 9–15)
AST SERPL-CCNC: 36 U/L (ref 0–40)
BASOPHILS ABSOLUTE: 0 K/UL (ref 0–0.2)
BASOPHILS RELATIVE PERCENT: 0.3 %
BILIRUB SERPL-MCNC: 1.8 MG/DL (ref 0.2–0.7)
BUN BLDV-MCNC: 34 MG/DL (ref 8–23)
CALCIUM SERPL-MCNC: 8.3 MG/DL (ref 8.5–9.9)
CHLORIDE BLD-SCNC: 105 MEQ/L (ref 95–107)
CO2: 24 MEQ/L (ref 20–31)
CREAT SERPL-MCNC: 1.46 MG/DL (ref 0.7–1.2)
EOSINOPHILS ABSOLUTE: 0 K/UL (ref 0–0.7)
EOSINOPHILS RELATIVE PERCENT: 0.3 %
GFR SERPL CREATININE-BSD FRML MDRD: 48.5 ML/MIN/{1.73_M2}
GLOBULIN: 3.3 G/DL (ref 2.3–3.5)
GLUCOSE BLD-MCNC: 102 MG/DL (ref 70–99)
GLUCOSE BLD-MCNC: 106 MG/DL (ref 70–99)
GLUCOSE BLD-MCNC: 115 MG/DL (ref 70–99)
GLUCOSE BLD-MCNC: 117 MG/DL (ref 70–99)
GLUCOSE BLD-MCNC: 128 MG/DL (ref 70–99)
HCT VFR BLD CALC: 43.5 % (ref 42–52)
HEMOGLOBIN: 14.1 G/DL (ref 14–18)
LACTATE DEHYDROGENASE, FLUID: 95 U/L
LYMPHOCYTES ABSOLUTE: 1.2 K/UL (ref 1–4.8)
LYMPHOCYTES RELATIVE PERCENT: 11.7 %
MCH RBC QN AUTO: 29.3 PG (ref 27–31.3)
MCHC RBC AUTO-ENTMCNC: 32.4 % (ref 33–37)
MCV RBC AUTO: 90.4 FL (ref 79–92.2)
MITOCHONDRIAL M2 AB, IGG: 1.4 U/ML (ref 0–4)
MONOCYTES ABSOLUTE: 0.7 K/UL (ref 0.2–0.8)
MONOCYTES RELATIVE PERCENT: 6.7 %
NEUTROPHILS ABSOLUTE: 8.2 K/UL (ref 1.4–6.5)
NEUTROPHILS RELATIVE PERCENT: 81 %
PATH CONSULT FLUID: NORMAL
PDW BLD-RTO: 18.4 % (ref 11.5–14.5)
PERFORMED ON: ABNORMAL
PLATELET # BLD: 132 K/UL (ref 130–400)
POTASSIUM REFLEX MAGNESIUM: 4.3 MEQ/L (ref 3.4–4.9)
RBC # BLD: 4.81 M/UL (ref 4.7–6.1)
SARS-COV-2, NAAT: NOT DETECTED
SODIUM BLD-SCNC: 140 MEQ/L (ref 135–144)
SPECIMEN TYPE: NORMAL
TOTAL PROTEIN: 5.8 G/DL (ref 6.3–8)
WBC # BLD: 10.1 K/UL (ref 4.8–10.8)

## 2023-01-11 PROCEDURE — 80053 COMPREHEN METABOLIC PANEL: CPT

## 2023-01-11 PROCEDURE — 87635 SARS-COV-2 COVID-19 AMP PRB: CPT

## 2023-01-11 PROCEDURE — 99232 SBSQ HOSP IP/OBS MODERATE 35: CPT | Performed by: SPECIALIST

## 2023-01-11 PROCEDURE — 6370000000 HC RX 637 (ALT 250 FOR IP)

## 2023-01-11 PROCEDURE — 51798 US URINE CAPACITY MEASURE: CPT

## 2023-01-11 PROCEDURE — 97535 SELF CARE MNGMENT TRAINING: CPT

## 2023-01-11 PROCEDURE — 6370000000 HC RX 637 (ALT 250 FOR IP): Performed by: NURSE PRACTITIONER

## 2023-01-11 PROCEDURE — 36415 COLL VENOUS BLD VENIPUNCTURE: CPT

## 2023-01-11 PROCEDURE — 6370000000 HC RX 637 (ALT 250 FOR IP): Performed by: SPECIALIST

## 2023-01-11 PROCEDURE — 6370000000 HC RX 637 (ALT 250 FOR IP): Performed by: INTERNAL MEDICINE

## 2023-01-11 PROCEDURE — 6370000000 HC RX 637 (ALT 250 FOR IP): Performed by: PSYCHIATRY & NEUROLOGY

## 2023-01-11 PROCEDURE — 85025 COMPLETE CBC W/AUTO DIFF WBC: CPT

## 2023-01-11 PROCEDURE — 99232 SBSQ HOSP IP/OBS MODERATE 35: CPT | Performed by: PSYCHIATRY & NEUROLOGY

## 2023-01-11 PROCEDURE — 6360000002 HC RX W HCPCS

## 2023-01-11 PROCEDURE — 2060000000 HC ICU INTERMEDIATE R&B

## 2023-01-11 RX ORDER — TRAMADOL HYDROCHLORIDE 50 MG/1
25 TABLET ORAL EVERY 6 HOURS PRN
Status: DISCONTINUED | OUTPATIENT
Start: 2023-01-11 | End: 2023-01-20 | Stop reason: HOSPADM

## 2023-01-11 RX ORDER — SERTRALINE HYDROCHLORIDE 25 MG/1
25 TABLET, FILM COATED ORAL DAILY
Status: DISCONTINUED | OUTPATIENT
Start: 2023-01-11 | End: 2023-01-20 | Stop reason: HOSPADM

## 2023-01-11 RX ORDER — TRAMADOL HYDROCHLORIDE 50 MG/1
25 TABLET ORAL ONCE
Status: COMPLETED | OUTPATIENT
Start: 2023-01-11 | End: 2023-01-11

## 2023-01-11 RX ORDER — FUROSEMIDE 20 MG/1
20 TABLET ORAL 2 TIMES DAILY
Status: DISCONTINUED | OUTPATIENT
Start: 2023-01-11 | End: 2023-01-20 | Stop reason: HOSPADM

## 2023-01-11 RX ADMIN — TRAMADOL HYDROCHLORIDE 25 MG: 50 TABLET ORAL at 16:33

## 2023-01-11 RX ADMIN — INSULIN GLARGINE 50 UNITS: 100 INJECTION, SOLUTION SUBCUTANEOUS at 21:31

## 2023-01-11 RX ADMIN — FUROSEMIDE 20 MG: 20 TABLET ORAL at 17:12

## 2023-01-11 RX ADMIN — SPIRONOLACTONE 50 MG: 50 TABLET ORAL at 08:49

## 2023-01-11 RX ADMIN — ONDANSETRON 4 MG: 2 INJECTION INTRAMUSCULAR; INTRAVENOUS at 21:32

## 2023-01-11 RX ADMIN — ENOXAPARIN SODIUM 30 MG: 100 INJECTION SUBCUTANEOUS at 08:49

## 2023-01-11 RX ADMIN — FUROSEMIDE 20 MG: 20 TABLET ORAL at 08:49

## 2023-01-11 RX ADMIN — SERTRALINE HYDROCHLORIDE 25 MG: 25 TABLET ORAL at 17:13

## 2023-01-11 RX ADMIN — TRAMADOL HYDROCHLORIDE 25 MG: 50 TABLET, FILM COATED ORAL at 00:55

## 2023-01-11 RX ADMIN — TRAMADOL HYDROCHLORIDE 25 MG: 50 TABLET ORAL at 21:32

## 2023-01-11 RX ADMIN — TRAMADOL HYDROCHLORIDE 25 MG: 50 TABLET ORAL at 10:18

## 2023-01-11 RX ADMIN — ENOXAPARIN SODIUM 30 MG: 100 INJECTION SUBCUTANEOUS at 21:32

## 2023-01-11 ASSESSMENT — ENCOUNTER SYMPTOMS
SHORTNESS OF BREATH: 0
RESPIRATORY NEGATIVE: 1
VOMITING: 0
DIARRHEA: 0
ABDOMINAL PAIN: 0
CONSTIPATION: 0
PHOTOPHOBIA: 0
WHEEZING: 0
EYES NEGATIVE: 1
ABDOMINAL DISTENTION: 1
BACK PAIN: 0
COUGH: 0
NAUSEA: 0

## 2023-01-11 ASSESSMENT — PAIN - FUNCTIONAL ASSESSMENT
PAIN_FUNCTIONAL_ASSESSMENT: PREVENTS OR INTERFERES SOME ACTIVE ACTIVITIES AND ADLS
PAIN_FUNCTIONAL_ASSESSMENT: PREVENTS OR INTERFERES SOME ACTIVE ACTIVITIES AND ADLS

## 2023-01-11 ASSESSMENT — PAIN SCALES - GENERAL
PAINLEVEL_OUTOF10: 5
PAINLEVEL_OUTOF10: 5

## 2023-01-11 ASSESSMENT — PAIN DESCRIPTION - LOCATION
LOCATION: ABDOMEN
LOCATION: ABDOMEN

## 2023-01-11 NOTE — PROGRESS NOTES
Collin Koenig is a 76 y.o. male patient. Current Facility-Administered Medications   Medication Dose Route Frequency Provider Last Rate Last Admin    enoxaparin Sodium (LOVENOX) injection 30 mg  30 mg SubCUTAneous BID BELKIS Ellsworth - CNP   30 mg at 01/09/23 2141    ondansetron (ZOFRAN-ODT) disintegrating tablet 4 mg  4 mg Oral Q8H PRN BELKIS Day CNP        Or    ondansetron (ZOFRAN) injection 4 mg  4 mg IntraVENous Q6H PRN BELKIS Day CNP        polyethylene glycol (GLYCOLAX) packet 17 g  17 g Oral Daily PRN BELKIS Day CNP        insulin glargine (LANTUS) injection vial 50 Units  50 Units SubCUTAneous Nightly BELKIS Day - CNP        insulin lispro (HUMALOG) injection vial 0-8 Units  0-8 Units SubCUTAneous TID WC BELKIS Day - CNP        insulin lispro (HUMALOG) injection vial 0-4 Units  0-4 Units SubCUTAneous Nightly BELKIS Day CNP        glucose chewable tablet 16 g  4 tablet Oral PRN BELKIS Day - CNP        dextrose bolus 10% 125 mL  125 mL IntraVENous PRN BELKIS Day CNP        Or    dextrose bolus 10% 250 mL  250 mL IntraVENous PRN BELKIS Day CNP        glucagon (rDNA) injection 1 mg  1 mg SubCUTAneous PRN BELKIS Day CNP        dextrose 10 % infusion   IntraVENous Continuous PRN BELKIS Ellsworth - JUSTINA        spironolactone (ALDACTONE) tablet 50 mg  50 mg Oral Daily Quita Graf MD   50 mg at 01/10/23 0836    furosemide (LASIX) tablet 20 mg  20 mg Oral Daily Quita Graf MD   20 mg at 01/10/23 0836     No Known Allergies  Principal Problem:    Suicidal ideation  Active Problems:    General weakness  Resolved Problems:    * No resolved hospital problems. *    Blood pressure 102/61, pulse (!) 106, temperature 97.9 °F (36.6 °C), temperature source Oral, resp. rate 20, height 5' 8\" (1.727 m), weight 268 lb (121.6 kg), SpO2 94 %.     Subjective vague abdominal discomfort,   Objective ascitic fluid probably transudate, SAAG was not calculated since fluid albumin was not done, however low fluid protien noted, no pleopcytosis, CT abdomen results noted ? Hepatocellular carcinoma. Assessment & Plan will check AFP.     Kurt Null MD  1/10/2023

## 2023-01-11 NOTE — PROGRESS NOTES
Pt awake at times and has a flat affect but is courteous and pleasant. C/O abdominal pain and tender to touch on evaluation especially right lower quad. Hypoactive bowel sounds present and patient voiding frequently small amounts of breonna urine. Noted +2 edema bilat lower legs.

## 2023-01-11 NOTE — CONSULTS
CC:   Chief Complaint   Patient presents with    Fatigue     Difficulty ambulating. HPI:  Patient is a pleasant 68-year-old gentleman who presented to the emergency department with a complaint of fatigue. Patient came by ambulance. He at that time stated he had not eaten or drink anything for several weeks and feels like he had given up and did not want left. His friends had come over and made him food she ate. His friends called the ambulance to take him to the hospital. Upon admission, patient noted to have ascites. Patient was found to have abnormal liver function tests, though denies any alcohol or drug abuse. Interventional radiology was consulted for diagnostic and therapeutic ultrasound-guided paracentesis. No family history on file. Past Surgical History:   Procedure Laterality Date    PARACENTESIS Left 01/10/2023    7850 ml removed by Dr. Cathi Byrnes - therapeutic & diagnostic        Past Medical History:   Diagnosis Date    Diabetes mellitus [facility])        Social History     Socioeconomic History    Marital status:      Spouse name: None    Number of children: None    Years of education: None    Highest education level: None   Tobacco Use    Smoking status: Never    Smokeless tobacco: Never   Vaping Use    Vaping Use: Never used   Substance and Sexual Activity    Alcohol use: Never    Drug use: Never       No Known Allergies    No current facility-administered medications on file prior to encounter.      Current Outpatient Medications on File Prior to Encounter   Medication Sig Dispense Refill    insulin glargine (LANTUS SOLOSTAR) 100 UNIT/ML injection pen 50 units at bedtime 15 pen 3    insulin lispro, 1 Unit Dial, (HUMALOG KWIKPEN) 100 UNIT/ML SOPN 16 units with each meals 15 pen 3    Insulin Pen Needle (NOVOFINE) 32G X 6 MM MISC qid 300 each 3    OneTouch Delica Lancets 17V MISC Qid on insulin 200 each 3    blood glucose test strips (ONETOUCH VERIO) strip Qid on insulin e 11.65 200 each 3 Blood Glucose Monitoring Suppl (ONETOUCH VERIO) w/Device KIT As directed 1 kit 0    insulin glargine (LANTUS SOLOSTAR) 100 UNIT/ML injection pen 80 units at bedtime (Patient not taking: Reported on 1/9/2023) 15 pen 3    insulin lispro, 1 Unit Dial, (HUMALOG KWIKPEN) 100 UNIT/ML SOPN 20 units at each meals 15 pen 3    Insulin Pen Needle (NOVOFINE) 32G X 6 MM MISC qid 300 each 3    atorvastatin (LIPITOR) 40 MG tablet Take 1 tablet by mouth daily (Patient not taking: Reported on 1/9/2023) 30 tablet 0    lisinopril (PRINIVIL;ZESTRIL) 20 MG tablet Take 1 tablet by mouth daily (Patient not taking: Reported on 1/9/2023) 30 tablet 0       Review of Systems   Constitutional:  Positive for fatigue. Negative for chills, diaphoresis and fever. HENT: Negative. Negative for congestion, ear pain, hearing loss and tinnitus. Eyes: Negative. Negative for photophobia. Respiratory: Negative. Negative for cough, shortness of breath and wheezing. Cardiovascular: Negative. Negative for chest pain, palpitations and leg swelling. Gastrointestinal:  Positive for abdominal distention. Negative for abdominal pain, constipation, diarrhea, nausea and vomiting. Genitourinary: Negative. Negative for dysuria, flank pain, frequency, hematuria and urgency. Musculoskeletal: Negative. Negative for back pain and neck pain. Skin: Negative. Negative for rash. Allergic/Immunologic: Negative for environmental allergies. Neurological: Negative. Negative for dizziness, tremors, weakness and headaches. Hematological:  Does not bruise/bleed easily. Psychiatric/Behavioral:  Positive for behavioral problems, dysphoric mood and suicidal ideas. Negative for hallucinations. The patient is not nervous/anxious.       OBJECTIVE:  /71   Pulse 94   Temp 97.1 °F (36.2 °C) (Oral)   Resp 18   Ht 5' 8\" (1.727 m)   Wt 268 lb (121.6 kg)   SpO2 94%   BMI 40.75 kg/m²     Physical Exam  Constitutional:       General: He is not in acute distress. Appearance: He is well-developed. He is not diaphoretic. HENT:      Head: Normocephalic and atraumatic. Nose: Nose normal.      Mouth/Throat:      Pharynx: No oropharyngeal exudate. Eyes:      General: No scleral icterus. Right eye: No discharge. Left eye: No discharge. Conjunctiva/sclera: Conjunctivae normal.   Neck:      Thyroid: No thyromegaly. Vascular: No JVD. Trachea: No tracheal deviation. Cardiovascular:      Rate and Rhythm: Normal rate and regular rhythm. Heart sounds: Normal heart sounds. No murmur heard. No friction rub. No gallop. Pulmonary:      Effort: No respiratory distress. Breath sounds: No stridor. No wheezing or rales. Chest:      Chest wall: No tenderness. Abdominal:      General: Bowel sounds are normal. There is distension. Palpations: Abdomen is soft. There is no mass. Tenderness: There is no abdominal tenderness. There is no guarding or rebound. Hernia: No hernia is present. Musculoskeletal:         General: No tenderness or deformity. Cervical back: Neck supple. Skin:     General: Skin is dry. Coloration: Skin is not pale. Findings: No erythema or rash. Neurological:      Mental Status: He is alert and oriented to person, place, and time. Cranial Nerves: No cranial nerve deficit. Psychiatric:         Behavior: Behavior is slowed. Lab Results   Component Value Date/Time    WBC 10.1 01/11/2023 04:59 AM    HGB 14.1 01/11/2023 04:59 AM    HCT 43.5 01/11/2023 04:59 AM     01/11/2023 04:59 AM    MCV 90.4 01/11/2023 04:59 AM       CT ABDOMEN PELVIS W IV CONTRAST Additional Contrast? None    Result Date: 1/10/2023  EXAMINATION: CT OF THE ABDOMEN AND PELVIS WITH CONTRAST 1/10/2023 9:41 am TECHNIQUE: CT of the abdomen and pelvis was performed with the administration of intravenous contrast. Multiplanar reformatted images are provided for review.  Automated exposure control, iterative reconstruction, and/or weight based adjustment of the mA/kV was utilized to reduce the radiation dose to as low as reasonably achievable. COMPARISON: CTA chest dated 06/09/2022 HISTORY: ORDERING SYSTEM PROVIDED HISTORY: CIRRHOSIS TECHNOLOGIST PROVIDED HISTORY: Reason for exam:->CIRRHOSIS Additional Contrast?->None What reading provider will be dictating this exam?->CRC FINDINGS: Lower Chest: Lung bases are clear. Organs: Liver is heterogeneous with multifocal areas of hypoenhancement including a coalescent area occupying much of the right hepatic lobe 14 cm in dimension of indeterminate significance although hepatocellular carcinoma of diffuse involvement not excluded with multifocal other areas scattered throughout. Perihepatic ascites. Gallbladder contains numerous stones in the dependent portion neck of the gallbladder of cholelithiasis without wall thickening. .  Pancreas unremarkable. Spleen enlarged to 14.8 cm in craniocaudal dimension. Adrenals without nodule. Kidneys without suspicious renal lesion and no hydronephrosis. Low-attenuation areas bilateral of renal cortical cysts as well as bilateral renal cortical scarring atrophy. GI/Bowel: No focal thickening or disproportion dilatation of bowel. No inflammatory findings. Pelvis: No suspicious pelvic lesion or bulky pelvic adenopathy/free fluid. Peritoneum/Retroperitoneum: Scattered mildly enlarged retroperitoneal lymph nodes measuring up to a periaortic left 17 mm lymph node. Aortocaval 2 cm lymph node along with 2 cm periportal lymph nodes. Mesenteric edema with moderate volume abdominopelvic ascites. Vasculature: Grossly normal caliber of abdominal aorta and vasculature Bones/Soft Tissues: No acute osseous or soft tissue findings. Fat containing left direct inguinal hernia.      Progressive hepatic disease with low-attenuation areas scattered throughout diffusely becoming coalescent or confluent up to 14 cm in the right hepatic lobe occupying much of the right hepatic dome appears grossly progressed from 06/09/2022 CTA chest comparison with partial imaging was performed of the upper abdomen. Underline hepatocellular carcinoma with diffuse involvement not excluded. Perihepatic ascites with sequela portal hypertension including splenomegaly. Periportal and retroperitoneal adenopathy additionally noted. Moderate ascites     US DUP ABD PEL RETRO SCROT COMPLETE    Result Date: 1/9/2023  EXAMINATION: DOPPLER EVALUATION OF THE PELVIS 1/9/2023 12:34 pm COMPARISON: None. HISTORY: ORDERING SYSTEM PROVIDED HISTORY: elevated LFTS TECHNOLOGIST PROVIDED HISTORY: Reason for exam:->elevated LFTS What reading provider will be dictating this exam?->CRC FINDINGS: Echogenic liver with nodular contour. Perihepatic ascites. A 1.3 cm cyst is noted in the liver, which appears to be in the right lobe. Color Doppler flow is noted in the main hepatic vein, hepatic artery, and left hepatic vein. The main portal vein and right portal vein demonstrate irregular color Doppler flow on this exam.  The right and left hepatic veins are suboptimally visualized. The splenic vein is suboptimally visualized. The flow in the vessel labeled hepatic artery appears turbulent and high velocity. Cholelithiasis and gallbladder sludge noted. No pericholecystic fluid or obvious gallbladder wall thickening. Geanie Huron sign was not documented on technologist where she. Suboptimal visualization of the IVC. Four-quadrant ascites. Findings suggestive of cirrhosis. There is moderate to large volume of ascites. Limited visualization of the portal venous system demonstrates color Doppler flow in the left portal vein, but irregular color Doppler flow in the main and right portal veins. Consider CT of the abdomen/pelvis with IV contrast for further evaluation of the portal venous system.         ASSESSMENT ANDPLAN:    ASSESSMENT: Patient with ascites of unclear origin with abnormal liver function tests, patient denies any alcohol use. PLAN: Diagnostic and therapeutic ultrasound-guided paracentesis. Patient understands the risks, benefits, and alternatives to the procedure including bleeding, infection, damage to bowel loops. Patient agrees to proceed.     Nan Linton MD, [facility]

## 2023-01-11 NOTE — PROGRESS NOTES
[facility] [place] [facility]. FOLLOW-UP NOTE       1/11/2023     Patient was seen and examined in person, Chart reviewed   Patient's case discussed with staff/team    Chief Complaint: Depression SI    Interim History:     Patient seen with his friend at the bedside. Patient is minimizing depressive symptoms and suicidal thoughts currently. He is medically compromised and is undergoing investigation for the ascites. Moreover patient is unable to ambulate independently. He has been living alone. I discussed with the patient today about his willingness to consider alternative to inpatient psych unit which would be a SNF placement with psychiatry input to treat his depression. Patient is hesitant to consider that plan. His friend encouraged him to think about it. Patient agreed to think about it and he wanted some time to make a decision. Initially when patient came to the hospital with suicidal thoughts he was thinking about shooting himself with a gun and he has multiple guns at home. He clearly denies having any suicidal thoughts. Appetite:   [x] Normal/Unchanged  [] Increased  [] Decreased      Sleep:       [] Normal/Unchanged  [x] Fair       [] Poor              Energy:    [] Normal/Unchanged  [] Increased  [x] Decreased        SI [] Present  [x] Absent    HI  []Present  [x] Absent     Aggression:  [] yes  [x] no    Patient is [x] able  [] unable to CONTRACT FOR SAFETY     PAST MEDICAL/PSYCHIATRIC HISTORY:   Past Medical History:   Diagnosis Date    Diabetes mellitus ([facility] [address]        FAMILY/SOCIAL HISTORY:  No family history on file. Social History     Socioeconomic History    Marital status:       Spouse name: Not on file    Number of children: Not on file    Years of education: Not on file    Highest education level: Not on file   Occupational History    Not on file   Tobacco Use    Smoking status: Never    Smokeless tobacco: Never   Vaping Use    Vaping Use: Never used Substance and Sexual Activity    Alcohol use: Never    Drug use: Never    Sexual activity: Not on file   Other Topics Concern    Not on file   Social History Narrative    Not on file     Social Determinants of Health     Financial Resource Strain: Not on file   Food Insecurity: Not on file   Transportation Needs: Not on file   Physical Activity: Not on file   Stress: Not on file   Social Connections: Not on file   Intimate Partner Violence: Not on file   Housing Stability: Not on file           ROS:  [x] All negative/unchanged except if checked.  Explain positive(checked items) below:  [] Constitutional  [] Eyes  [] Ear/Nose/Mouth/Throat  [] Respiratory  [] CV  [] GI  []   [] Musculoskeletal  [] Skin/Breast  [] Neurological  [] Endocrine  [] Heme/Lymph  [] Allergic/Immunologic    Explanation:     MEDICATIONS:    Current Facility-Administered Medications:     traMADol (ULTRAM) tablet 25 mg, 25 mg, Oral, Q6H PRN, Tasneem Mesa MD, 25 mg at 01/11/23 1018    furosemide (LASIX) tablet 20 mg, 20 mg, Oral, BID, Tasneem Mesa MD    sertraline (ZOLOFT) tablet 25 mg, 25 mg, Oral, Daily, Debra Cherry MD    enoxaparin Sodium (LOVENOX) injection 30 mg, 30 mg, SubCUTAneous, BID, BELKIS Day CNP, 30 mg at 01/11/23 0849    ondansetron (ZOFRAN-ODT) disintegrating tablet 4 mg, 4 mg, Oral, Q8H PRN **OR** ondansetron (ZOFRAN) injection 4 mg, 4 mg, IntraVENous, Q6H PRN, BELKIS Day CNP    polyethylene glycol (GLYCOLAX) packet 17 g, 17 g, Oral, Daily PRN, BELKIS Day CNP    insulin glargine (LANTUS) injection vial 50 Units, 50 Units, SubCUTAneous, Nightly, BELKIS Day CNP, 50 Units at 01/10/23 2214    insulin lispro (HUMALOG) injection vial 0-8 Units, 0-8 Units, SubCUTAneous, TID WC, BELKIS Day CNP    insulin lispro (HUMALOG) injection vial 0-4 Units, 0-4 Units, SubCUTAneous, Nightly, BELKIS Day CNP    glucose chewable tablet 16 g, 4 tablet, Oral, PRN, BELKIS Obregon CNP    dextrose bolus 10% 125 mL, 125 mL, IntraVENous, PRN **OR** dextrose bolus 10% 250 mL, 250 mL, IntraVENous, PRN, BELKIS Day - CNP    glucagon (rDNA) injection 1 mg, 1 mg, SubCUTAneous, PRN, BELKIS Day - CNP    dextrose 10 % infusion, , IntraVENous, Continuous PRN, BELKIS Corado - CNP    spironolactone (ALDACTONE) tablet 50 mg, 50 mg, Oral, Daily, Jose Oreilly MD, 50 mg at 01/11/23 0849      Examination:  /68   Pulse 93   Temp 97 °F (36.1 °C) (Axillary)   Resp 18   Ht 5' 8\" (1.727 m)   Wt 268 lb (121.6 kg)   SpO2 94%   BMI 40.75 kg/m²   Gait - steady  Medication side effects(SE): no    Mental Status Examination:    Level of consciousness:  within normal limits   Appearance:  poor grooming and poor hygiene  Behavior/Motor:  psychomotor retardation  Attitude toward examiner:  cooperative  Speech:  slow   Mood: depressed  Affect:  mood congruent  Thought processes:  slow   Thought content:  Suicidal Ideation:  denies suicidal ideation, not really sure if he is minimizing the symptoms  Cognition:  oriented to person, place, and time   Concentration poor  Insight poor   Judgement poor     ASSESSMENT:   Patient symptoms are:  [] Well controlled  [] Improving  [] Worsening  [] No change      Diagnosis:   MDD single episode severe    LABS:    Recent Labs     01/09/23  0329 01/10/23  0453 01/11/23  0459   WBC 7.8 7.0 10.1   HGB 13.0* 13.5* 14.1    143 132     Recent Labs     01/09/23  0329 01/10/23  0453 01/11/23  0459    140 140   K 4.5 4.2 4.3    103 105   CO2 22 22 24   BUN 37* 38* 34*   CREATININE 1.54* 1.67* 1.46*   GLUCOSE 101* 193* 106*     Recent Labs     01/09/23  0329 01/10/23  0453 01/11/23  0459   BILITOT 2.1* 1.7* 1.8*   ALKPHOS 220* 245* 203*   AST 52* 42* 36   ALT 30 26 21     Lab Results   Component Value Date/Time    ETOH <10 01/08/2023 06:45 PM     Lab Results   Component Value Date/Time    TSH 2.620 01/08/2023 06:45 PM     No results found for: LITHIUM  No results found for: VALPROATE, CBMZ        Treatment Plan:  Reviewed current Medications with the patient. Zoloft 25 mg started  Risks, benefits, side effects, drug-to-drug interactions and alternatives to treatment were discussed. Considering his physical health need as well as his mental health need, I think the best place of treatment would be at a SNF with psychiatry input to treat the depression. The guns at home has to be secured as well. Patient is ambivalent about going to SNF. The alternative would be to get him to [facility] directly from the medical floor. Patient needs time to think about the plan overnight.   We will evaluate him again tomorrow and make a final decision on the treatment plan      Electronically signed by Lewis Shultz MD on 1/11/2023 at 3:32 PM

## 2023-01-11 NOTE — PROGRESS NOTES
Physical Therapy Missed Treatment   Facility/Department: [facility] MED SURG [id number]/[id number]    NAME: Nemo Pavon    : 1945 (35 y.o.)  MRN: [id number]    Account: [de-identified]  Gender: male    Chart reviewed, attempted PT at 1000. Patient unavailable 2° to:    [] Hold per nsg request    [x] Pt declined- pt resting in bed upon arrival and educated on role as therapy. Pt declines despite encouragement stating, \"maybe come back in an hour or so. \" Educated pt on time restraints but informed on possible return if time permits. [] Pt. . off floor for test/procedure. [] Pt. Unavailable       Will attempt PT treatment again at earliest convenience.       Electronically signed by Murtaza Irizarry PTA on 23 at 10:39 AM EST

## 2023-01-11 NOTE — CARE COORDINATION
CALL TO TRANSFER CENTER AND INITIATED GERIPSYCH TRANSFER. AWAITING COVID RESULT AND THEY WILL SUBMIT CASE.  RN AWARE

## 2023-01-11 NOTE — FLOWSHEET NOTE
Am nursing  assessment completed. Pt :   awake and resting in bed            Alert and oriented. Yocha Dehe  Breakfast: poor appetite  RESP:        even and non labored       Lung sounds:                                 Oxygen: room air  Complaints of: abdominal discomfort  Pain:  IV: SL  TELE: NSR  Dressings:  preventative heel mepilex applied  Precautions:    SUICIDAL 1:1 sitter care          Falls:  45      Valentín: 13  Chart and meds reviewed. Noted Labs:  na 104 k 4.3 bun 34 cr1.46 wbc 10.1 hgb 14.1  Plan for today:    Call light in reach. NOTES:  1018 prn ultram for complaints of abdominal discomfort. Physical therapy to return to work with pt.      [phone] therapy by to work with pt. Dr Larry Simental by for rounds. Electronically signed by Rachel Bruce RN on 1/11/2023 at 11:36 AM    1300 Dr Vani Wills by for rounds. States he will see pt tomorrow and continue to evaluate for discharge planning. Pt to remain 1:1 sitter care. Electronically signed by Rachel Bruce RN on 1/11/2023 at 2:00 PM    [phone] covid swab collected and sent for discharge planning. Electronically signed by Rachel Bruce RN on 1/11/2023 at 3:37 PM    1600 secure message sent to update Dr Patsy Bond updated AFP. Will round on pt today. Electronically signed by Rachel Bruce RN on 1/11/2023 at 4:01 PM    [phone] prn ultram for continued complaints of abdominal discomfort. Electronically signed by Rachel Bruce RN on 1/11/2023 at 8:21 PM    1823 per [place] [address]. . update given to [facility] requesting ekg and pink slipped faxed to [phone]. Electronically signed by Rachel Bruce RN on 1/11/2023 at 6:26 PM    1930 Dr Patsy Bond by for rounds. Pt updated on abnormal test findings. Pt tearful and upset. New consult for oncology to see pt. Reassurance given. Sitter at bedside. Electronically signed by Rachel Bruce RN on 1/11/2023 at 8:19 PM    2019 transfer center updated, discharge on hold. Electronically signed by Rhys Mitchell RN on 1/11/2023 at 8:20 PM

## 2023-01-11 NOTE — CARE COORDINATION
PT WILL NEED ARRANGED WITH [facility]SF[facility] ONCE MEDICALLY CLEARED FOR DC AND WILL ACCEPT A SNF LEVEL PATIENT REQUIRING THERAPY NEEDS.

## 2023-01-11 NOTE — PROGRESS NOTES
Physical Therapy Med Surg Daily Treatment Note  Facility/Department: [facility]  [facility]/[id number]       NAME: Jurgen Henry  : 1945 (22 y.o.)  MRN: [id number]  CODE STATUS: Full Code    Date of Service: 2023    Patient Diagnosis(es): Suicidal ideation [R45.851]  Lactic acidosis [E87.20]  General weakness [R53.1]  Elevated troponin [R77.8]  SALOME (acute kidney injury) ([address] [N17.9]  Current severe episode of major depressive disorder without psychotic features without prior episode [facility]) [F32.2]   Chief Complaint   Patient presents with    Fatigue     Difficulty ambulating. Patient Active Problem List    Diagnosis Date Noted    General weakness 2023    Suicidal ideation 2023    Hyperosmolar non-ketotic state due to type 2 diabetes mellitus ([address] 10/05/2020    DKA (diabetic ketoacidosis) ([address] 10/05/2020    Type 2 diabetes mellitus with hyperglycemia ([address] 10/05/2020    SALOME (acute kidney injury) ([address] 10/05/2020    Type 2 diabetes mellitus without complications ([address]         Past Medical History:   Diagnosis Date    Diabetes mellitus ([address]      Past Surgical History:   Procedure Laterality Date    PARACENTESIS Left 01/10/2023    7850 ml removed by Dr. Randall Theodore - therapeutic & diagnostic       Chart Reviewed: Yes    Restrictions:  Restrictions/Precautions: Fall Risk  Position Activity Restriction  Other position/activity restrictions: 1:1 suicide precautions    SUBJECTIVE:   Subjective: I am just so tired    Pain  Pain: reports abdominal discomfort but does not rate    OBJECTIVE:        Bed mobility  Rolling to Right: Moderate assistance;2 Person assistance  Supine to Sit: Contact guard assistance  Scooting: Moderate assistance  Bed Mobility Comments: HOB slightly elevated, use of hand rails. Pt reports increased abdominal pain with all bed mobility, requires increased time and effort to complete as well as multiple trials.  Pt reports increased dizziness once in seated, but able to maintain seated balance with CGA. PT Exercises  Exercise Treatment: seated AP/LAQ x10 BLE  Static Sitting Balance Exercises: seated EOB balance ~5 mins          Activity Tolerance  Activity Tolerance: Patient limited by endurance; Patient limited by fatigue          ASSESSMENT   Assessment: Pt requires significant motivation to participate with therapy this date. Pt requires increased time and effort with all mobility d/t increased pain and anxiety with any movement. Able to tolerate sitting EOB at this time before getting dizzy and requesting return to bed. Discharge Recommendations:  Continue to assess pending progress         Goals  Short Term Goals  Short Term Goal 1: Pt will demonstrate rolling mod A +1  Long Term Goals  Long Term Goal 1: Pt will demonstrate bed mobility max A +1  Long Term Goal 2: Pt will demonstrate sitting edge of bed with SBA >/= 10 min to prepare for OOB activity  Long Term Goal 3: Pt will demonstrate transfers max A +2 with safest AD  Long Term Goal 4: Pt will demonstrate static standing with safest AD mod A +1 >/= 30 sec    PLAN    General Plan: 1 time a day 3-6 times a week  Safety Devices  Type of Devices: Bed alarm in place, Call light within reach, Left in bed, Sitter present, All fall risk precautions in place     [facility] (6 CLICK) BASIC MOBILITY  AM-PAC Inpatient Mobility Raw Score : 8     Therapy Time   Individual   Time In 1120   Time Out 1139   Minutes 19      BM/trsf- 12  [address], 01/11/23 at 11:54 AM         Definitions for assistance levels  Independent = pt does not require any physical supervision or assistance from another person for activity completion. Device may be needed.   Stand by assistance = pt requires verbal cues or instructions from another person, close to but not touching, to perform the activity  Minimal assistance= pt performs 75% or more of the activity; assistance is required to complete the activity  Moderate assistance= pt performs 50% of the activity; assistance is required to complete the activity  Maximal assistance = pt performs 25% of the activity; assistance is required to complete the activity  Dependent = pt requires total physical assistance to accomplish the task

## 2023-01-11 NOTE — PROGRESS NOTES
Addendum  Patient medically stable/cleared for placement at [facility] per psychiatry recommendations

## 2023-01-11 NOTE — PROGRESS NOTES
Hospitalist Progress Note      PCP: John Lee    Date of Admission: 1/8/2023    Chief Complaint: weakness/abd pain     Subjective: pt awake/alert     Medications:  Reviewed    Infusion Medications    dextrose       Scheduled Medications    enoxaparin  30 mg SubCUTAneous BID    insulin glargine  50 Units SubCUTAneous Nightly    insulin lispro  0-8 Units SubCUTAneous TID     insulin lispro  0-4 Units SubCUTAneous Nightly    spironolactone  50 mg Oral Daily    furosemide  20 mg Oral Daily     PRN Meds: traMADol, ondansetron **OR** ondansetron, polyethylene glycol, glucose, dextrose bolus **OR** dextrose bolus, glucagon (rDNA), dextrose      Intake/Output Summary (Last 24 hours) at 1/11/2023 1138  Last data filed at 1/11/2023 0805  Gross per 24 hour   Intake 290 ml   Output 520 ml   Net -230 ml       Exam:    /71   Pulse 94   Temp 97.1 °F (36.2 °C) (Oral)   Resp 18   Ht 5' 8\" (1.727 m)   Wt 268 lb (121.6 kg)   SpO2 94%   BMI 40.75 kg/m²     General appearance: No apparent distress, appears stated age and cooperative. HEENT: Pupils equal, round, and reactive to light. Conjunctivae/corneas clear. Neck: Supple, with full range of motion. No jugular venous distention. Trachea midline. Respiratory:  Normal respiratory effort. Clear to auscultation, bilaterally without Rales/Wheezes/Rhonchi. Cardiovascular: Regular rate and rhythm with normal S1/S2 without murmurs, rubs or gallops. Abdomen:  distended with normal bowel sounds. Musculoskeletal:   edema bilaterally. Full range of motion without deformity. Skin: Skin color, texture, turgor normal.  No rashes or lesions. Neurologic:  Neurovascularly intact without any focal sensory/motor deficits.  Cranial nerves: II-XII intact, grossly non-focal.  Psychiatric: Alert and oriented, thought content appropriate, normal insight  Capillary Refill: Brisk,< 3 seconds   Peripheral Pulses: +2 palpable, equal bilaterally       Labs:   Recent Labs     01/09/23  0329 01/10/23  0453 01/11/23 0459   WBC 7.8 7.0 10.1   HGB 13.0* 13.5* 14.1   HCT 40.0* 40.9* 43.5    143 132     Recent Labs     01/09/23  0329 01/10/23  0453 01/11/23 0459    140 140   K 4.5 4.2 4.3    103 105   CO2 22 22 24   BUN 37* 38* 34*   CREATININE 1.54* 1.67* 1.46*   CALCIUM 8.4* 8.2* 8.3*     Recent Labs     01/08/23  1845 01/09/23 0329 01/10/23  0453 01/11/23  0459   AST 71* 52* 42* 36   ALT 36 30 26 21   BILIDIR 1.3*  --   --   --    BILITOT 2.1* 2.1* 1.7* 1.8*   ALKPHOS 289* 220* 245* 203*     Recent Labs     01/08/23  1927   INR 1.4     Recent Labs     01/08/23  1845 01/09/23  0103 01/09/23 0329   TROPONINI 0.054* 0.039* 0.040*       Urinalysis:      Lab Results   Component Value Date/Time    NITRU Negative 01/08/2023 06:45 PM    WBCUA 3-5 01/08/2023 06:45 PM    BACTERIA Negative 01/08/2023 06:45 PM    RBCUA 10-20 01/08/2023 06:45 PM    BLOODU SMALL 01/08/2023 06:45 PM    SPECGRAV 1.021 01/08/2023 06:45 PM    GLUCOSEU Negative 01/08/2023 06:45 PM       Radiology:  IR US GUIDED PARACENTESIS   Final Result   1. Status post technically successful ultrasound-guided paracentesis. Edwina Frey is a Male of 76 years age, referred for Ultrasound Guided Paracentesis. PROCEDURE: Survey of the abdomen showed large amount of ascites fluid. After obtaining informed consent, the patient was positioned supine on the sonography table. Using ultrasound, the skin over the left hemiabdomen was locally anesthetized with 1% lidocaine. Following that, a [place] needle was advanced into the fluid    pocket using ultrasound visualization. 7850cc, of clear yellow fluid were aspirated and sent for cytology, and pathology. The needle was removed, and hemostasis was obtained with pressure. A Band-Aid was placed. Post procedure images did not demonstrate hemorrhage at the target site.  The patient tolerated the procedure well. The patient left the department in good condition. A radiology nurse was in presence monitoring vital signs, assisting throughout the procedure. CT ABDOMEN PELVIS W IV CONTRAST Additional Contrast? None   Final Result   Progressive hepatic disease with low-attenuation areas scattered throughout   diffusely becoming coalescent or confluent up to 14 cm in the right hepatic   lobe occupying much of the right hepatic dome appears grossly progressed from   06/09/2022 CTA chest comparison with partial imaging was performed of the   upper abdomen. Underline hepatocellular carcinoma with diffuse involvement   not excluded. Perihepatic ascites with sequela portal hypertension including   splenomegaly. Periportal and retroperitoneal adenopathy additionally noted. Moderate ascites         US DUP ABD PEL RETRO SCROT COMPLETE   Final Result   Findings suggestive of cirrhosis. There is moderate to large volume of   ascites. Limited visualization of the portal venous system demonstrates color Doppler   flow in the left portal vein, but irregular color Doppler flow in the main   and right portal veins. Consider CT of the abdomen/pelvis with IV contrast   for further evaluation of the portal venous system. US DUP LOWER EXTREMITIES BILATERAL VENOUS   Final Result   No evidence of DVT in either lower extremity. CT Head W/O Contrast   Final Result   No acute intracranial abnormality. XR CHEST PORTABLE   Final Result   Low lung volumes with atelectasis/possible developing left basilar pneumonia. Assessment/Plan:    Active Hospital Problems    Diagnosis Date Noted    General weakness [R53.1] 01/09/2023     Priority: Medium    Suicidal ideation [R45.851] 01/08/2023     Priority: Medium         DVT Prophylaxis: lovenox   Diet: ADULT DIET; Regular; 5 carb choices (75 gm/meal); Low Sodium (2 gm);  Safety Tray; Safety Tray (Disposables)  Code Status: Full Code    PT/OT Eval Status: pending    Dispo - Ascites/elevated liver enzymes/hepatocellular CA? -post paracentesis, appreciate GI recommendations  Depression/suicidal- per psych.  1/1 sitter, May needs placement at DC  Morbid obesity with BMI 40%- supportive care  Edema/ascites- lasix  change to BID   CKD -stable Cr  DM- lantus/ISS  Medically stable for acute admission, will follow up along with consultants     Electronically signed by Latesha Quezada MD on 1/11/2023 at 11:38 AM

## 2023-01-12 LAB
ALBUMIN SERPL-MCNC: 2.4 G/DL (ref 3.5–4.6)
ALP BLD-CCNC: 200 U/L (ref 35–104)
ALT SERPL-CCNC: 20 U/L (ref 0–41)
ANION GAP SERPL CALCULATED.3IONS-SCNC: 11 MEQ/L (ref 9–15)
AST SERPL-CCNC: 34 U/L (ref 0–40)
BASOPHILS ABSOLUTE: 0 K/UL (ref 0–0.2)
BASOPHILS RELATIVE PERCENT: 0.1 %
BILIRUB SERPL-MCNC: 2 MG/DL (ref 0.2–0.7)
BUN BLDV-MCNC: 45 MG/DL (ref 8–23)
CALCIUM SERPL-MCNC: 8.6 MG/DL (ref 8.5–9.9)
CHLORIDE BLD-SCNC: 101 MEQ/L (ref 95–107)
CO2: 24 MEQ/L (ref 20–31)
CREAT SERPL-MCNC: 1.98 MG/DL (ref 0.7–1.2)
EOSINOPHILS ABSOLUTE: 0 K/UL (ref 0–0.7)
EOSINOPHILS RELATIVE PERCENT: 0.2 %
GFR SERPL CREATININE-BSD FRML MDRD: 33.7 ML/MIN/{1.73_M2}
GLOBULIN: 3.3 G/DL (ref 2.3–3.5)
GLUCOSE BLD-MCNC: 103 MG/DL (ref 70–99)
GLUCOSE BLD-MCNC: 103 MG/DL (ref 70–99)
GLUCOSE BLD-MCNC: 82 MG/DL (ref 70–99)
GLUCOSE BLD-MCNC: 92 MG/DL (ref 70–99)
HCT VFR BLD CALC: 45.2 % (ref 42–52)
HEMOGLOBIN: 14.8 G/DL (ref 14–18)
LYMPHOCYTES ABSOLUTE: 1.2 K/UL (ref 1–4.8)
LYMPHOCYTES RELATIVE PERCENT: 12.1 %
MCH RBC QN AUTO: 29.3 PG (ref 27–31.3)
MCHC RBC AUTO-ENTMCNC: 32.7 % (ref 33–37)
MCV RBC AUTO: 89.7 FL (ref 79–92.2)
MONOCYTES ABSOLUTE: 0.7 K/UL (ref 0.2–0.8)
MONOCYTES RELATIVE PERCENT: 7.1 %
NEUTROPHILS ABSOLUTE: 7.8 K/UL (ref 1.4–6.5)
NEUTROPHILS RELATIVE PERCENT: 80.5 %
PDW BLD-RTO: 18.5 % (ref 11.5–14.5)
PERFORMED ON: ABNORMAL
PERFORMED ON: NORMAL
PERFORMED ON: NORMAL
PLATELET # BLD: 161 K/UL (ref 130–400)
POTASSIUM REFLEX MAGNESIUM: 5.1 MEQ/L (ref 3.4–4.9)
RBC # BLD: 5.04 M/UL (ref 4.7–6.1)
REASON FOR REJECTION: NORMAL
REJECTED TEST: NORMAL
SODIUM BLD-SCNC: 136 MEQ/L (ref 135–144)
TOTAL PROTEIN: 5.7 G/DL (ref 6.3–8)
WBC # BLD: 9.7 K/UL (ref 4.8–10.8)

## 2023-01-12 PROCEDURE — 6370000000 HC RX 637 (ALT 250 FOR IP): Performed by: INTERNAL MEDICINE

## 2023-01-12 PROCEDURE — 99232 SBSQ HOSP IP/OBS MODERATE 35: CPT | Performed by: PSYCHIATRY & NEUROLOGY

## 2023-01-12 PROCEDURE — 6370000000 HC RX 637 (ALT 250 FOR IP): Performed by: PSYCHIATRY & NEUROLOGY

## 2023-01-12 PROCEDURE — 80053 COMPREHEN METABOLIC PANEL: CPT

## 2023-01-12 PROCEDURE — 6370000000 HC RX 637 (ALT 250 FOR IP): Performed by: SPECIALIST

## 2023-01-12 PROCEDURE — 36415 COLL VENOUS BLD VENIPUNCTURE: CPT

## 2023-01-12 PROCEDURE — 6360000002 HC RX W HCPCS

## 2023-01-12 PROCEDURE — 2580000003 HC RX 258: Performed by: INTERNAL MEDICINE

## 2023-01-12 PROCEDURE — 97535 SELF CARE MNGMENT TRAINING: CPT

## 2023-01-12 PROCEDURE — 2060000000 HC ICU INTERMEDIATE R&B

## 2023-01-12 PROCEDURE — 85025 COMPLETE CBC W/AUTO DIFF WBC: CPT

## 2023-01-12 RX ORDER — SODIUM CHLORIDE 9 MG/ML
INJECTION, SOLUTION INTRAVENOUS CONTINUOUS
Status: DISCONTINUED | OUTPATIENT
Start: 2023-01-12 | End: 2023-01-17

## 2023-01-12 RX ORDER — SCOLOPAMINE TRANSDERMAL SYSTEM 1 MG/1
1 PATCH, EXTENDED RELEASE TRANSDERMAL
Status: DISCONTINUED | OUTPATIENT
Start: 2023-01-12 | End: 2023-01-20 | Stop reason: HOSPADM

## 2023-01-12 RX ADMIN — SPIRONOLACTONE 50 MG: 50 TABLET ORAL at 08:18

## 2023-01-12 RX ADMIN — SODIUM CHLORIDE: 9 INJECTION, SOLUTION INTRAVENOUS at 13:53

## 2023-01-12 RX ADMIN — SODIUM CHLORIDE: 9 INJECTION, SOLUTION INTRAVENOUS at 23:07

## 2023-01-12 RX ADMIN — FUROSEMIDE 20 MG: 20 TABLET ORAL at 08:18

## 2023-01-12 RX ADMIN — FUROSEMIDE 20 MG: 20 TABLET ORAL at 17:23

## 2023-01-12 RX ADMIN — ENOXAPARIN SODIUM 30 MG: 100 INJECTION SUBCUTANEOUS at 21:23

## 2023-01-12 RX ADMIN — SERTRALINE HYDROCHLORIDE 25 MG: 25 TABLET ORAL at 08:18

## 2023-01-12 RX ADMIN — ONDANSETRON 4 MG: 2 INJECTION INTRAMUSCULAR; INTRAVENOUS at 08:52

## 2023-01-12 RX ADMIN — ENOXAPARIN SODIUM 30 MG: 100 INJECTION SUBCUTANEOUS at 08:18

## 2023-01-12 NOTE — CARE COORDINATION
SPOKE WITH [facility] [place] AS PT WAS TO GO TO [facility] LAST PM.  LAST EVENING RN CANCELLED THE TRANSFER. WILL NEED NEW CASE CALLED TO [facility]ESS [facility] ONCE PT MEDICALLY STABLE AGAIN.

## 2023-01-12 NOTE — PROGRESS NOTES
Marcel Flannery is a 76 y.o. male patient. Current Facility-Administered Medications   Medication Dose Route Frequency Provider Last Rate Last Admin    traMADol (ULTRAM) tablet 25 mg  25 mg Oral Q6H PRN Vani Culver MD   25 mg at 01/11/23 1633    furosemide (LASIX) tablet 20 mg  20 mg Oral BID Vani Culver MD   20 mg at 01/11/23 1712    sertraline (ZOLOFT) tablet 25 mg  25 mg Oral Daily Flavio Pavon MD   25 mg at 01/11/23 1713    enoxaparin Sodium (LOVENOX) injection 30 mg  30 mg SubCUTAneous BID BELKIS Benz CNP   30 mg at 01/11/23 0849    ondansetron (ZOFRAN-ODT) disintegrating tablet 4 mg  4 mg Oral Q8H PRN BELKIS Benz CNP        Or    ondansetron (ZOFRAN) injection 4 mg  4 mg IntraVENous Q6H PRN BELKIS Benz CNP        polyethylene glycol (GLYCOLAX) packet 17 g  17 g Oral Daily PRN BELKIS Benz CNP        insulin glargine (LANTUS) injection vial 50 Units  50 Units SubCUTAneous Nightly BELKIS Day CNP   50 Units at 01/10/23 2214    insulin lispro (HUMALOG) injection vial 0-8 Units  0-8 Units SubCUTAneous TID WC BELKIS Day CNP        insulin lispro (HUMALOG) injection vial 0-4 Units  0-4 Units SubCUTAneous Nightly BELKIS Day CNP        glucose chewable tablet 16 g  4 tablet Oral PRN BELKIS Day CNP        dextrose bolus 10% 125 mL  125 mL IntraVENous PRN BELKIS Day CNP        Or    dextrose bolus 10% 250 mL  250 mL IntraVENous PRN BELKIS Day CNP        glucagon (rDNA) injection 1 mg  1 mg SubCUTAneous PRN BELKIS Day CNP        dextrose 10 % infusion   IntraVENous Continuous PRN BELKIS Benz CNP        spironolactone (ALDACTONE) tablet 50 mg  50 mg Oral Daily Rui Lund MD   50 mg at 01/11/23 0849     No Known Allergies  Principal Problem:    Suicidal ideation  Active Problems:    General weakness  Resolved Problems:    * No resolved hospital problems.  *    Blood pressure 125/78, pulse 93, temperature 97 °F (36.1 °C), temperature source Axillary, resp. rate 19, height 5' 8\" (1.727 m), weight 268 lb (121.6 kg), SpO2 94 %. 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 Would get oncology consultation prognosis appears to be grave. He is waiting to be placed to a geriatric psych facility.     Vince Morales MD  1/11/2023

## 2023-01-12 NOTE — FLOWSHEET NOTE
Pt denies suicidal ideations. Resting quietly. No c/o at this time. Electronically signed by Sandra Cortez RN on 1/12/2023 at 4:16 PM

## 2023-01-12 NOTE — PROGRESS NOTES
Hospitalist Progress Note      PCP: John Lee    Date of Admission: 1/8/2023    Chief Complaint: nausea/weakness    Subjective: pt awake, has no complains on pain, has nausea today     Medications:  Reviewed    Infusion Medications    sodium chloride      dextrose       Scheduled Medications    scopolamine  1 patch TransDERmal Q72H    furosemide  20 mg Oral BID    sertraline  25 mg Oral Daily    enoxaparin  30 mg SubCUTAneous BID    insulin glargine  50 Units SubCUTAneous Nightly    insulin lispro  0-8 Units SubCUTAneous TID WC    insulin lispro  0-4 Units SubCUTAneous Nightly    spironolactone  50 mg Oral Daily     PRN Meds: traMADol, ondansetron **OR** ondansetron, polyethylene glycol, glucose, dextrose bolus **OR** dextrose bolus, glucagon (rDNA), dextrose      Intake/Output Summary (Last 24 hours) at 1/12/2023 1008  Last data filed at 1/12/2023 0130  Gross per 24 hour   Intake 235 ml   Output 200 ml   Net 35 ml       Exam:    /76   Pulse 92   Temp 97.6 °F (36.4 °C) (Oral)   Resp 16   Ht 5' 8\" (1.727 m)   Wt 268 lb (121.6 kg)   SpO2 90%   BMI 40.75 kg/m²     General appearance: No apparent distress, appears stated age and cooperative. HEENT: Pupils equal, round, and reactive to light. Conjunctivae/corneas clear. Neck: Supple, with full range of motion. No jugular venous distention. Trachea midline. Respiratory:  Normal respiratory effort. Clear to auscultation, bilaterally without Rales/Wheezes/Rhonchi. Cardiovascular: Regular rate and rhythm with normal S1/S2 without murmurs, rubs or gallops. Abdomen: Soft, distended with normal bowel sounds. Musculoskeletal: trace edema bilaterally. Full range of motion without deformity. Skin: Skin color, texture, turgor normal.  No rashes or lesions. Neurologic:  Neurovascularly intact without any focal sensory/motor deficits.  Cranial nerves: II-XII intact, grossly non-focal.  Psychiatric: Alert and oriented, thought content appropriate, normal insight  Capillary Refill: Brisk,< 3 seconds   Peripheral Pulses: +2 palpable, equal bilaterally       Labs:   Recent Labs     01/10/23  0453 01/11/23  0459 01/12/23  0503   WBC 7.0 10.1 9.7   HGB 13.5* 14.1 14.8   HCT 40.9* 43.5 45.2    132 161     Recent Labs     01/10/23  0453 01/11/23  0459 01/12/23  0659    140 136   K 4.2 4.3 5.1*    105 101   CO2 22 24 24   BUN 38* 34* 45*   CREATININE 1.67* 1.46* 1.98*   CALCIUM 8.2* 8.3* 8.6     Recent Labs     01/10/23  0453 01/11/23  0459 01/12/23  0659   AST 42* 36 34   ALT 26 21 20   BILITOT 1.7* 1.8* 2.0*   ALKPHOS 245* 203* 200*     No results for input(s): INR in the last 72 hours. No results for input(s): Towana Ball in the last 72 hours. Urinalysis:      Lab Results   Component Value Date/Time    NITRU Negative 01/08/2023 06:45 PM    WBCUA 3-5 01/08/2023 06:45 PM    BACTERIA Negative 01/08/2023 06:45 PM    RBCUA 10-20 01/08/2023 06:45 PM    BLOODU SMALL 01/08/2023 06:45 PM    SPECGRAV 1.021 01/08/2023 06:45 PM    GLUCOSEU Negative 01/08/2023 06:45 PM       Radiology:  IR US GUIDED PARACENTESIS   Final Result   1. Status post technically successful ultrasound-guided paracentesis. Renée Moore is a Male of 76 years age, referred for Ultrasound Guided Paracentesis. PROCEDURE: Survey of the abdomen showed large amount of ascites fluid. After obtaining informed consent, the patient was positioned supine on the sonography table. Using ultrasound, the skin over the left hemiabdomen was locally anesthetized with 1% lidocaine. Following that, a Yueh needle was advanced into the fluid    pocket using ultrasound visualization. 7850cc, of clear yellow fluid were aspirated and sent for cytology, and pathology. The needle was removed, and hemostasis was obtained with pressure. A Band-Aid was placed.      Post procedure images did not demonstrate hemorrhage at the target site. The patient tolerated the procedure well. The patient left the department in good condition. A radiology nurse was in presence monitoring vital signs, assisting throughout the procedure. CT ABDOMEN PELVIS W IV CONTRAST Additional Contrast? None   Final Result   Progressive hepatic disease with low-attenuation areas scattered throughout   diffusely becoming coalescent or confluent up to 14 cm in the right hepatic   lobe occupying much of the right hepatic dome appears grossly progressed from   06/09/2022 CTA chest comparison with partial imaging was performed of the   upper abdomen. Underline hepatocellular carcinoma with diffuse involvement   not excluded. Perihepatic ascites with sequela portal hypertension including   splenomegaly. Periportal and retroperitoneal adenopathy additionally noted. Moderate ascites         US DUP ABD PEL RETRO SCROT COMPLETE   Final Result   Findings suggestive of cirrhosis. There is moderate to large volume of   ascites. Limited visualization of the portal venous system demonstrates color Doppler   flow in the left portal vein, but irregular color Doppler flow in the main   and right portal veins. Consider CT of the abdomen/pelvis with IV contrast   for further evaluation of the portal venous system. US DUP LOWER EXTREMITIES BILATERAL VENOUS   Final Result   No evidence of DVT in either lower extremity. CT Head W/O Contrast   Final Result   No acute intracranial abnormality. XR CHEST PORTABLE   Final Result   Low lung volumes with atelectasis/possible developing left basilar pneumonia. Assessment/Plan:    Active Hospital Problems    Diagnosis Date Noted    General weakness [R53.1] 01/09/2023     Priority: Medium    Suicidal ideation [R45.851] 01/08/2023     Priority: Medium         DVT Prophylaxis: lovenox  Diet: ADULT DIET; Regular; 5 carb choices (75 gm/meal); Low Sodium (2 gm);  Safety Tray; Safety Tray (Disposables)  Code Status: Full Code    PT/OT Eval Status:     Dispo -    Ascites/elevated liver enzymes/hepatocellular CA? -post paracentesis, appreciate GI recommendations  oncology consultation ordered, prognosis appears to be grave. He is waiting to be placed to a geriatric psych facility-social service on case. Nausea/poor PO intake- initiating mild IV hydration, adding scopolamine patch   Depression/suicidal- per psych.  1/1 sitter, May needs placement at DC  Morbid obesity with BMI 40%- supportive care  Edema/ascites- lasix  change to BID   CKD -stable Cr  DM- lantus/ISS  Medically stable for acute admission, will follow up along with consultants       Electronically signed by Lashonda Sorensen MD on 1/12/2023 at 10:08 AM

## 2023-01-12 NOTE — FLOWSHEET NOTE
Constant observer discontinued per Dr Jory Jordan. Electronically signed by Felipe Pina RN on 1/12/2023 at 4:12 PM

## 2023-01-12 NOTE — PROGRESS NOTES
Physical Therapy Med Surg Daily Treatment Note  Facility/Department: [facility] TELEMETRY  [id number]       NAME: Marcel Flannery  : 1945 (31 y.o.)  MRN: [id number]  CODE STATUS: Full Code    Date of Service: 2023    Patient Diagnosis(es): Suicidal ideation [R45.851]  Lactic acidosis [E87.20]  General weakness [R53.1]  Elevated troponin [R77.8]  SALOME (acute kidney injury) ([address] [N17.9]  Current severe episode of major depressive disorder without psychotic features without prior episode [facility]) [F32.2]   Chief Complaint   Patient presents with    Fatigue     Difficulty ambulating. Patient Active Problem List    Diagnosis Date Noted    General weakness 2023    Suicidal ideation 2023    Hyperosmolar non-ketotic state due to type 2 diabetes mellitus ([address] 10/05/2020    DKA (diabetic ketoacidosis) ([address] 10/05/2020    Type 2 diabetes mellitus with hyperglycemia ([address] 10/05/2020    SALOME (acute kidney injury) ([address] 10/05/2020    Type 2 diabetes mellitus without complications ([address]         Past Medical History:   Diagnosis Date    Diabetes mellitus ([address]      Past Surgical History:   Procedure Laterality Date    PARACENTESIS Left 01/10/2023    7850 ml removed by Dr. Aguirre Graft - therapeutic & diagnostic       Chart Reviewed: Yes    Restrictions:  Restrictions/Precautions: Fall Risk  Position Activity Restriction  Other position/activity restrictions: 1:1 suicide precautions    SUBJECTIVE:   Subjective: I have to go pee    Pain  Pain: reports abdominal discomfort but does not rate    OBJECTIVE:        Bed mobility  Rolling to Left: Moderate assistance  Supine to Sit: Minimal assistance;2 Person assistance  Sit to Supine: Stand by assistance  Scooting: Contact guard assistance  Bed Mobility Comments: HOB minimally elevated, hand over hand assist provided and hand rails utilized.  Pt with good ability to maintain seated balance once at EOB, but only sits for ~ 3 minutes before returning to supine after stating, \" I need to pee\". Pt returned to supine and after several minutes does not urinate and when asked to come back to seated, declines all further mobility despite encouragement being provided. Activity Tolerance  Activity Tolerance: Patient limited by endurance; Patient limited by fatigue          ASSESSMENT   Assessment: Pt agreeable to therapy session this date. Pt with minimal participation, only agreeable to sitting EOB before returning to supine to attept to urinate and declines further mobility. Discharge Recommendations:  Continue to assess pending progress         Goals  Short Term Goals  Short Term Goal 1: Pt will demonstrate rolling mod A +1  Long Term Goals  Long Term Goal 1: Pt will demonstrate bed mobility max A +1  Long Term Goal 2: Pt will demonstrate sitting edge of bed with SBA >/= 10 min to prepare for OOB activity  Long Term Goal 3: Pt will demonstrate transfers max A +2 with safest AD  Long Term Goal 4: Pt will demonstrate static standing with safest AD mod A +1 >/= 30 sec    PLAN    General Plan: 1 time a day 3-6 times a week  Safety Devices  Type of Devices: All fall risk precautions in place, Bed alarm in place, Call light within reach, Left in bed     AMPA (6 CLICK) BASIC MOBILITY  AM-PAC Inpatient Mobility Raw Score : 8     Therapy Time   Individual   Time In 1413   Time Out 1426   Minutes 13      /[facility] [place], [place], 01/12/23 at 2:47 PM         Definitions for assistance levels  Independent = pt does not require any physical supervision or assistance from another person for activity completion. Device may be needed.   Stand by assistance = pt requires verbal cues or instructions from another person, close to but not touching, to perform the activity  Minimal assistance= pt performs 75% or more of the activity; assistance is required to complete the activity  Moderate assistance= pt performs 50% of the activity; assistance is required to complete the activity  Maximal assistance = pt performs 25% of the activity; assistance is required to complete the activity  Dependent = pt requires total physical assistance to accomplish the task

## 2023-01-12 NOTE — PROGRESS NOTES
Physical Therapy Missed Treatment   Facility/Department: [facility] MED SURG E520/C515-96    NAME: Babar Connors    : 1945 (69 y.o.)  MRN: [id number]    Account: [de-identified]  Gender: male    Chart reviewed, attempted PT at 10:05. Patient unavailable 2° to:        [x] Pt declined after multiple attempts and encouragement. Pt educated on the importance of OOB activities. Pt adamantly declined stating he just wants to sleep all day and  will try tomorrow. [x] Nsg notified        Will attempt PT treatment again at earliest convenience.       Electronically signed by Zaida Marie PTA on 23 at 10:09 AM EST

## 2023-01-12 NOTE — PROGRESS NOTES
[facility] FOLLOW-UP NOTE       1/12/2023     Patient was seen and examined in person, Chart reviewed   Patient's case discussed with staff/team    Chief Complaint: Depression SI    Interim History:     Patient has been consistently denying feeling suicidal since the first day when he made a threat. Patient since then has realized that he has so many family member who are interested in his welfare as well as his friends who have been calling them nonstop and visiting him. Patient feels overwhelmed with the support that he has received. Patient reported that he is not suicidal anymore. He is agreeable to go to receive physical therapy wherever it needs to be done. Patient has been sleeping better. He rated his mood to be 5 out of 10. Appetite:   [x] Normal/Unchanged  [] Increased  [] Decreased      Sleep:       [] Normal/Unchanged  [x] Fair       [] Poor              Energy:    [] Normal/Unchanged  [] Increased  [x] Decreased        SI [] Present  [x] Absent    HI  []Present  [x] Absent     Aggression:  [] yes  [x] no    Patient is [x] able  [] unable to CONTRACT FOR SAFETY     PAST MEDICAL/PSYCHIATRIC HISTORY:   Past Medical History:   Diagnosis Date    Diabetes mellitus ([facility]        FAMILY/SOCIAL HISTORY:  No family history on file. Social History     Socioeconomic History    Marital status:       Spouse name: Not on file    Number of children: Not on file    Years of education: Not on file    Highest education level: Not on file   Occupational History    Not on file   Tobacco Use    Smoking status: Never    Smokeless tobacco: Never   Vaping Use    Vaping Use: Never used   Substance and Sexual Activity    Alcohol use: Never    Drug use: Never    Sexual activity: Not on file   Other Topics Concern    Not on file   Social History Narrative    Not on file     Social Determinants of Health     Financial Resource Strain: Not on file   Food Insecurity: Not on file   Transportation Needs: Not on file   Physical Activity: Not on file   Stress: Not on file   Social Connections: Not on file   Intimate Partner Violence: Not on file   Housing Stability: Not on file           ROS:  [x] All negative/unchanged except if checked.  Explain positive(checked items) below:  [] Constitutional  [] Eyes  [] Ear/Nose/Mouth/Throat  [] Respiratory  [] CV  [] GI  []   [] Musculoskeletal  [] Skin/Breast  [] Neurological  [] Endocrine  [] Heme/Lymph  [] Allergic/Immunologic    Explanation:     MEDICATIONS:    Current Facility-Administered Medications:     0.9 % sodium chloride infusion, , IntraVENous, Continuous, La Nena Mahoney MD, Last Rate: 100 mL/hr at 01/12/23 1353, New Bag at 01/12/23 1353    scopolamine (TRANSDERM-SCOP) transdermal patch 1 patch, 1 patch, TransDERmal, Q72H, La Nena Mahoney MD    traMADol Shilpi Formosa) tablet 25 mg, 25 mg, Oral, Q6H PRN, La Nena Mahoney MD, 25 mg at 01/11/23 2132    furosemide (LASIX) tablet 20 mg, 20 mg, Oral, BID, La Nena Mahoney MD, 20 mg at 01/12/23 0818    sertraline (ZOLOFT) tablet 25 mg, 25 mg, Oral, Daily, Tristen Demarco MD, 25 mg at 01/12/23 0818    enoxaparin Sodium (LOVENOX) injection 30 mg, 30 mg, SubCUTAneous, BID, BELKIS Day CNP, 30 mg at 01/12/23 0818    ondansetron (ZOFRAN-ODT) disintegrating tablet 4 mg, 4 mg, Oral, Q8H PRN **OR** ondansetron (ZOFRAN) injection 4 mg, 4 mg, IntraVENous, Q6H PRN, BELKIS Herrera CNP, 4 mg at 01/12/23 0852    polyethylene glycol (GLYCOLAX) packet 17 g, 17 g, Oral, Daily PRN, BELKIS Herrera CNP    insulin glargine (LANTUS) injection vial 50 Units, 50 Units, SubCUTAneous, Nightly, BELKIS Day CNP, 50 Units at 01/11/23 2131    insulin lispro (HUMALOG) injection vial 0-8 Units, 0-8 Units, SubCUTAneous, TID WC, BELKIS Day CNP    insulin lispro (HUMALOG) injection vial 0-4 Units, 0-4 Units, SubCUTAneous, Nightly, BELKIS Day CNP    glucose chewable tablet 16 g, 4 tablet, Oral, PRN, BELKIS Fuentes CNP    dextrose bolus 10% 125 mL, 125 mL, IntraVENous, PRN **OR** dextrose bolus 10% 250 mL, 250 mL, IntraVENous, PRN, BELKIS Day CNP    glucagon (rDNA) injection 1 mg, 1 mg, SubCUTAneous, PRN, BELKIS Day CNP    dextrose 10 % infusion, , IntraVENous, Continuous PRN, BELKIS Fuentes CNP    spironolactone (ALDACTONE) tablet 50 mg, 50 mg, Oral, Daily, Iva Hamilton MD, 50 mg at 01/12/23 0818      Examination:  /63   Pulse 87   Temp 97.3 °F (36.3 °C) (Oral)   Resp 16   Ht 5' 8\" (1.727 m)   Wt 268 lb (121.6 kg)   SpO2 94%   BMI 40.75 kg/m²   Gait - steady  Medication side effects(SE): no    Mental Status Examination:    Level of consciousness:  within normal limits   Appearance:  poor grooming and poor hygiene  Behavior/Motor:  psychomotor retardation  Attitude toward examiner:  cooperative  Speech:  slow   Mood: depressed  Affect:  mood congruent  Thought processes:  slow   Thought content:  Suicidal Ideation:  denies suicidal ideation, not really sure if he is minimizing the symptoms  Cognition:  oriented to person, place, and time   Concentration poor  Insight poor   Judgement poor     ASSESSMENT:   Patient symptoms are:  [] Well controlled  [] Improving  [] Worsening  [] No change      Diagnosis:   MDD single episode severe    LABS:    Recent Labs     01/10/23  0453 01/11/23  0459 01/12/23  0503   WBC 7.0 10.1 9.7   HGB 13.5* 14.1 14.8    132 161     Recent Labs     01/10/23  0453 01/11/23  0459 01/12/23  0659    140 136   K 4.2 4.3 5.1*    105 101   CO2 22 24 24   BUN 38* 34* 45*   CREATININE 1.67* 1.46* 1.98*   GLUCOSE 193* 106* 103*     Recent Labs     01/10/23  0453 01/11/23  0459 01/12/23  0659   BILITOT 1.7* 1.8* 2.0*   ALKPHOS 245* 203* 200*   AST 42* 36 34   ALT 26 21 20     Lab Results   Component Value Date/Time    ETOH <10 01/08/2023 06:45 PM     Lab Results   Component Value Date/Time    TSH 2.620 01/08/2023 06:45 PM No results found for: LITHIUM  No results found for: VALPROATE, CBMZ        Treatment Plan:  Reviewed current Medications with the patient. continue current medication  DC sitter. Patient does not meet criteria for inpatient admission to psychiatric unit at this time. Instead patient would benefit from transfer to a SNF for rehabilitation unit for further stabilization of his physical health.   Patient is agreeable to the plan      Electronically signed by Shyam Arrieta MD on 1/12/2023 at 5:10 PM

## 2023-01-13 LAB
ALBUMIN SERPL-MCNC: 2 G/DL (ref 3.5–4.6)
ALP BLD-CCNC: 187 U/L (ref 35–104)
ALT SERPL-CCNC: 21 U/L (ref 0–41)
ANION GAP SERPL CALCULATED.3IONS-SCNC: 16 MEQ/L (ref 9–15)
AST SERPL-CCNC: 43 U/L (ref 0–40)
BASOPHILS ABSOLUTE: 0 K/UL (ref 0–0.2)
BASOPHILS RELATIVE PERCENT: 0.1 %
BILIRUB SERPL-MCNC: 2 MG/DL (ref 0.2–0.7)
BUN BLDV-MCNC: 53 MG/DL (ref 8–23)
CALCIUM SERPL-MCNC: 8.4 MG/DL (ref 8.5–9.9)
CHLORIDE BLD-SCNC: 99 MEQ/L (ref 95–107)
CO2: 21 MEQ/L (ref 20–31)
CREAT SERPL-MCNC: 2.44 MG/DL (ref 0.7–1.2)
EOSINOPHILS ABSOLUTE: 0 K/UL (ref 0–0.7)
EOSINOPHILS RELATIVE PERCENT: 0.3 %
GFR SERPL CREATININE-BSD FRML MDRD: 26.2 ML/MIN/{1.73_M2}
GLOBULIN: 4 G/DL (ref 2.3–3.5)
GLUCOSE BLD-MCNC: 101 MG/DL (ref 70–99)
GLUCOSE BLD-MCNC: 110 MG/DL (ref 70–99)
GLUCOSE BLD-MCNC: 117 MG/DL (ref 70–99)
GLUCOSE BLD-MCNC: 96 MG/DL (ref 70–99)
GLUCOSE BLD-MCNC: 97 MG/DL (ref 70–99)
GLUCOSE BLD-MCNC: 97 MG/DL (ref 70–99)
HCT VFR BLD CALC: 45.6 % (ref 42–52)
HEMOGLOBIN: 14.9 G/DL (ref 14–18)
LYMPHOCYTES ABSOLUTE: 1.1 K/UL (ref 1–4.8)
LYMPHOCYTES RELATIVE PERCENT: 12.9 %
MCH RBC QN AUTO: 29.9 PG (ref 27–31.3)
MCHC RBC AUTO-ENTMCNC: 32.7 % (ref 33–37)
MCV RBC AUTO: 91.5 FL (ref 79–92.2)
MONOCYTES ABSOLUTE: 0.6 K/UL (ref 0.2–0.8)
MONOCYTES RELATIVE PERCENT: 7.3 %
NEUTROPHILS ABSOLUTE: 6.5 K/UL (ref 1.4–6.5)
NEUTROPHILS RELATIVE PERCENT: 79.4 %
PDW BLD-RTO: 19 % (ref 11.5–14.5)
PERFORMED ON: ABNORMAL
PERFORMED ON: ABNORMAL
PERFORMED ON: NORMAL
PLATELET # BLD: 156 K/UL (ref 130–400)
POTASSIUM REFLEX MAGNESIUM: 5.8 MEQ/L (ref 3.4–4.9)
RBC # BLD: 4.99 M/UL (ref 4.7–6.1)
SODIUM BLD-SCNC: 136 MEQ/L (ref 135–144)
TOTAL PROTEIN: 6 G/DL (ref 6.3–8)
WBC # BLD: 8.2 K/UL (ref 4.8–10.8)

## 2023-01-13 PROCEDURE — 85025 COMPLETE CBC W/AUTO DIFF WBC: CPT

## 2023-01-13 PROCEDURE — 80053 COMPREHEN METABOLIC PANEL: CPT

## 2023-01-13 PROCEDURE — 6360000002 HC RX W HCPCS

## 2023-01-13 PROCEDURE — 99222 1ST HOSP IP/OBS MODERATE 55: CPT | Performed by: NURSE PRACTITIONER

## 2023-01-13 PROCEDURE — 6370000000 HC RX 637 (ALT 250 FOR IP): Performed by: SPECIALIST

## 2023-01-13 PROCEDURE — 2060000000 HC ICU INTERMEDIATE R&B

## 2023-01-13 PROCEDURE — 6370000000 HC RX 637 (ALT 250 FOR IP): Performed by: PSYCHIATRY & NEUROLOGY

## 2023-01-13 PROCEDURE — 36415 COLL VENOUS BLD VENIPUNCTURE: CPT

## 2023-01-13 PROCEDURE — 6370000000 HC RX 637 (ALT 250 FOR IP): Performed by: INTERNAL MEDICINE

## 2023-01-13 RX ORDER — SODIUM POLYSTYRENE SULFONATE 15 G/60ML
15 SUSPENSION ORAL; RECTAL ONCE
Status: COMPLETED | OUTPATIENT
Start: 2023-01-13 | End: 2023-01-13

## 2023-01-13 RX ADMIN — SPIRONOLACTONE 50 MG: 50 TABLET ORAL at 11:31

## 2023-01-13 RX ADMIN — SODIUM POLYSTYRENE SULFONATE 15 G: 15 SUSPENSION ORAL; RECTAL at 11:31

## 2023-01-13 RX ADMIN — SERTRALINE HYDROCHLORIDE 25 MG: 25 TABLET ORAL at 11:31

## 2023-01-13 RX ADMIN — ENOXAPARIN SODIUM 30 MG: 100 INJECTION SUBCUTANEOUS at 11:31

## 2023-01-13 RX ADMIN — ENOXAPARIN SODIUM 30 MG: 100 INJECTION SUBCUTANEOUS at 20:36

## 2023-01-13 NOTE — PROGRESS NOTES
Vascular Medicine and Interventional Radiology:    PROGRESS NOTE:       Isabelle Keith  : 1945  MR #: [id number]       PCP:  [address] [place]     Attending Physician: Lashonda Sorensen MD     Date of Admission: 2023  6:37 PM     Chief Complaint:   Chief Complaint   Patient presents with    Fatigue     Difficulty ambulating. SUBJECTIVE:   Isabelle Keith seen and examined. S/P paracentesis 1/10/2023 drained for 7850 cc fluid. He is sitting up in bed and reports abdomen with generalized discomfort and feeling of becoming distended again. IR also consulted today for percutaneous CT guided biopsy of liver mass of unknown etiology. Denies nausea or vomiting, afebrile. Generalized weakness. Past Medical History:   has a past medical history of Diabetes mellitus ([address] Past SurgicalHistory:   has a past surgical history that includes Paracentesis (Left, 01/10/2023). Allergies:Patient has no known allergies. Home Medications:   Prior to Admission medications    Medication Sig Start Date End Date Taking?  Authorizing Provider   insulin glargine (LANTUS SOLOSTAR) 100 UNIT/ML injection pen 50 units at bedtime 10/14/20   Kimo Palma MD   insulin lispro, 1 Unit Dial, (HUMALOG KWIKPEN) 100 UNIT/ML SOPN 16 units with each meals 10/14/20   Kimo Palma MD   Insulin Pen Needle (NOVOFINE) 32G X 6 MM MISC qid 10/6/20   MD Portia Beaulieu Delica Lancets 09G MISC Qid on insulin 10/6/20   Kimo Palma MD   blood glucose test strips (ONETOUCH VERIO) strip Qid on insulin e 11.65 10/6/20   Kimo Palma MD   Blood Glucose Monitoring Suppl ([place]far[place]) w/Device KIT As directed 10/6/20   Kimo Palma MD   insulin glargine (LANTUS SOLOSTAR) 100 UNIT/ML injection pen 80 units at bedtime  Patient not taking: Reported on 2023 10/6/20   Kimo Palma MD   insulin lispro, 1 Unit Dial, (HUMALOG KWIKPEN) 100 UNIT/ML SOPN 20 units at each meals 10/6/20   Rice Peasant, MD   Insulin Pen Needle (NOVOFINE) 32G X 6 MM MISC qid 10/6/20   Chandler Camarena MD   atorvastatin (LIPITOR) 40 MG tablet Take 1 tablet by mouth daily  Patient not taking: Reported on 1/9/2023 10/5/20   Patricia Shea DO   lisinopril (PRINIVIL;ZESTRIL) 20 MG tablet Take 1 tablet by mouth daily  Patient not taking: Reported on 1/9/2023 10/5/20   Patricia Shea DO        Family History: No family history on file. SocialHistory:    Social History     Socioeconomic History    Marital status:      Spouse name: Not on file    Number of children: Not on file    Years of education: Not on file    Highest education level: Not on file   Occupational History    Not on file   Tobacco Use    Smoking status: Never    Smokeless tobacco: Never   Vaping Use    Vaping Use: Never used   Substance and Sexual Activity    Alcohol use: Never    Drug use: Never    Sexual activity: Not on file   Other Topics Concern    Not on file   Social History Narrative    Not on file     Social Determinants of Health     Financial Resource Strain: Not on file   Food Insecurity: Not on file   Transportation Needs: Not on file   Physical Activity: Not on file   Stress: Not on file   Social Connections: Not on file   Intimate Partner Violence: Not on file   Housing Stability: Not on file        ROS:   Review of Systems   Constitutional: Negative. Negative for chills, fatigue and fever. HENT: Negative. Negative for congestion, ear pain, sore throat and trouble swallowing. Eyes: Negative. Negative for visual disturbance. Respiratory: Negative. Negative for cough, shortness of breath and wheezing. Cardiovascular: Negative. Negative for chest pain, palpitations and leg swelling. Gastrointestinal:  Positive for abdominal distention and abdominal pain (generalized discomfort). Negative for diarrhea, nausea and vomiting. Endocrine: Negative. Genitourinary: Negative. Negative for difficulty urinating, dysuria and hematuria. Musculoskeletal: Negative. Negative for back pain. Skin: Negative. Negative for color change, rash and wound. Neurological:  Positive for weakness (generalized). Negative for dizziness, light-headedness, numbness and headaches. Hematological: Negative. Does not bruise/bleed easily. Psychiatric/Behavioral: Negative. The patient is not nervous/anxious. All other systems reviewed and are negative. Objective:   Vitals: /62   Pulse 79   Temp 97.6 °F (36.4 °C) (Oral)   Resp 16   Ht 5' 8\" (1.727 m)   Wt 268 lb (121.6 kg)   SpO2 91%   BMI 40.75 kg/m²      Physical Examination:      Physical Exam  Constitutional:       General: He is not in acute distress. Appearance: Normal appearance. He is not ill-appearing. HENT:      Head: Normocephalic. Nose: No congestion. Cardiovascular:      Rate and Rhythm: Normal rate. Heart sounds: Normal heart sounds. Pulmonary:      Effort: Pulmonary effort is normal.   Abdominal:      General: Bowel sounds are normal. There is distension (moderate). Musculoskeletal:         General: Normal range of motion. Cervical back: Normal range of motion. Right lower leg: No edema. Left lower leg: No edema. Skin:     General: Skin is warm and dry. Neurological:      Mental Status: He is alert and oriented to person, place, and time. Psychiatric:         Mood and Affect: Mood normal.         Behavior: Behavior normal.     Narrative   EXAMINATION:   CT OF THE ABDOMEN AND PELVIS WITH CONTRAST 1/10/2023 9:41 am       TECHNIQUE:   CT of the abdomen and pelvis was performed with the administration of   intravenous contrast. Multiplanar reformatted images are provided for review. Automated exposure control, iterative reconstruction, and/or weight based   adjustment of the mA/kV was utilized to reduce the radiation dose to as low   as reasonably achievable.        COMPARISON:   CTA chest dated 06/09/2022       HISTORY:   ORDERING SYSTEM PROVIDED HISTORY: CIRRHOSIS   TECHNOLOGIST PROVIDED HISTORY:   Reason for exam:->CIRRHOSIS   Additional Contrast?->None   What reading provider will be dictating this exam?->CRC       FINDINGS:   Lower Chest: Lung bases are clear. Organs: Liver is heterogeneous with multifocal areas of hypoenhancement   including a coalescent area occupying much of the right hepatic lobe 14 cm in   dimension of indeterminate significance although hepatocellular carcinoma of   diffuse involvement not excluded with multifocal other areas scattered   throughout. Perihepatic ascites. Gallbladder contains numerous stones in   the dependent portion neck of the gallbladder of cholelithiasis without wall   thickening. .  Pancreas unremarkable. Spleen enlarged to 14.8 cm in   craniocaudal dimension. Adrenals without nodule. Kidneys without suspicious   renal lesion and no hydronephrosis. Low-attenuation areas bilateral of renal   cortical cysts as well as bilateral renal cortical scarring atrophy. GI/Bowel: No focal thickening or disproportion dilatation of bowel. No   inflammatory findings. Pelvis: No suspicious pelvic lesion or bulky pelvic adenopathy/free fluid. Peritoneum/Retroperitoneum: Scattered mildly enlarged retroperitoneal lymph   nodes measuring up to a periaortic left 17 mm lymph node. Aortocaval 2 cm   lymph node along with 2 cm periportal lymph nodes. Mesenteric edema with   moderate volume abdominopelvic ascites. Vasculature: Grossly normal caliber of abdominal aorta and vasculature       Bones/Soft Tissues: No acute osseous or soft tissue findings. Fat containing   left direct inguinal hernia.            Impression   Progressive hepatic disease with low-attenuation areas scattered throughout   diffusely becoming coalescent or confluent up to 14 cm in the right hepatic   lobe occupying much of the right hepatic dome appears grossly progressed from   06/09/2022 CTA chest comparison with partial imaging was performed of the   upper abdomen. Underline hepatocellular carcinoma with diffuse involvement   not excluded. Perihepatic ascites with sequela portal hypertension including   splenomegaly. Periportal and retroperitoneal adenopathy additionally noted. Moderate ascites           1/10/2023:   Impression   1. Status post technically successful ultrasound-guided paracentesis. Dmitry Crenshaw is a Male of 76 years age, referred for Ultrasound Guided Paracentesis. PROCEDURE: Survey of the abdomen showed large amount of ascites fluid. After obtaining informed consent, the patient was positioned supine on the sonography table. Using ultrasound, the skin over the left hemiabdomen was locally anesthetized with 1% lidocaine. Following that, a iPosition needle was advanced into the fluid    pocket using ultrasound visualization. 7850cc, of clear yellow fluid were aspirated and sent for cytology, and pathology. The needle was removed, and hemostasis was obtained with pressure. A Band-Aid was placed. Post procedure images did not demonstrate hemorrhage at the target site. The patient tolerated the procedure well. The patient left the department in good condition. A radiology nurse was in presence monitoring vital signs, assisting throughout the procedure. ASSESSMENT:   Ascites of unknown etiology: S/P paracentesis 1/10/2023 drained for 7850 cc fluid. Abdominal distention appears to be returning. 2. Large hepatic mass of unknown etiology. IR requested to do percutaneous CT guided needle biopsy. Ct abdomen reviewed myself and results discussed with patient. Large 14 cm right lobe hepatic mass. PLAN:   On Tuesday 1/17/2023 plan for therapeutic paracentesis followed by percutaneous CT guided needle aspiration and biopsy of right hepatic mass. To do with conscious sedation. NPO after midnight Monday. No PM SQ Lovenox Monday or on Tuesday.  Can resume day after biopsy on 1/18/2023. Follow up with primary or oncology for pathology results.      Electronically signed by BELKIS Ochoa CNP on 1/16/23 at 9:55 AM EST

## 2023-01-13 NOTE — PROGRESS NOTES
Nursing report received from first shift nurse. Pt in bed resting. Pt Currently denies pain. Wearing O2 via nasal canula. Bed in lowest position. Side rails x2, call light in reach. Nurse will monitor Pt throughout the night. Perfect serve sent to Dr. Zulema Choe. Pt  requesting to hold pt lantus due to pt not eating much. Was given the okay per Dr. Zulema Choe to hold Lantus tonight.

## 2023-01-13 NOTE — CONSULTS
Hematology/Oncology Consult  Encounter Date: 2023 9:16 AM    Mr. Yoseph Lennon is a 76 y.o. male  : 1945  MRN: [id number]  Acct Number: [de-identified]  Requesting Provider: La Nena Mahoney MD    Reason for request: Probable malignancy      CONSULTANT: Jennifer Nicholson MD    HPI: Mr. Sebas Frank is a 76years old  male who is being seen in consultation at the request of Dr. Jonathan Galeano regarding probable malignancy . Mr. Sebas Frank has been quite depressed for last 6 weeks and stopped drinking or eating as He wanted to end his life. His friends brought him food but He drank chicken soup but not any solid foods, His friend called the ambulance to take him to the Hospital . Patient was noted to have significant ascites and abnormal liver function tests . CT scan of abdomen and pelvis showed multifocal areas of enhancement including a coalescent area occupying much of the Right hepatic lobe, 14 cms in dimension. There were other areas of enhancement . Hepatic carcinoma or Liver mets were suspected . CT scan also showed retroperitoneal lymphadenopathy . He had paracentesis done as He was noted to have significant ascites . Mr. Sebas Frank denies change in bowel habits, nausea, vomiting , melena or hematochezia . He has lost significant muscle mass because of starvation . He notice increasing abdominal distension and generalized anasarca. He has been sedentary not moving much from his chair. He lives alone . All of his family are in [place] and daughter lives in [place]. His wife  6 years ago and has been depressed since then .     Patient Active Problem List   Diagnosis    Type 2 diabetes mellitus without complications ([address]    Hyperosmolar non-ketotic state due to type 2 diabetes mellitus ([address]    DKA (diabetic ketoacidosis) (HCC)    Type 2 diabetes mellitus with hyperglycemia ([address]    SALOME (acute kidney injury) ([address]    Suicidal ideation    General weakness     Past Medical History:   Diagnosis Date    Diabetes mellitus ([address]      PSH:    Appendectomy many years ago . No family history on file. Father , mother , Brother and sister  of cancer . Social History     Socioeconomic History    Marital status:       Spouse name: Not on file    Number of children: Not on file    Years of education: Not on file    Highest education level: Not on file   Occupational History    Not on file   Tobacco Use    Smoking status: Never    Smokeless tobacco: Never   Vaping Use    Vaping Use: Never used   Substance and Sexual Activity    Alcohol use: Never    Drug use: Never    Sexual activity: Not on file   Other Topics Concern    Not on file   Social History Narrative    Not on file     Social Determinants of Health     Financial Resource Strain: Not on file   Food Insecurity: Not on file   Transportation Needs: Not on file   Physical Activity: Not on file   Stress: Not on file   Social Connections: Not on file   Intimate Partner Violence: Not on file   Housing Stability: Not on file            Current Facility-Administered Medications   Medication Dose Route Frequency Provider Last Rate Last Admin    0.9 % sodium chloride infusion   IntraVENous Continuous Jose Antonio Funes  mL/hr at 23 2307 New Bag at 23 2307    scopolamine (TRANSDERM-SCOP) transdermal patch 1 patch  1 patch TransDERmal Q72H Jose Antonio Funes MD   1 patch at 23 1723    traMADol (ULTRAM) tablet 25 mg  25 mg Oral Q6H PRN Jose Antonio Funes MD   25 mg at 23 2132    furosemide (LASIX) tablet 20 mg  20 mg Oral BID Jose Antonio Funes MD   20 mg at 23 1723    sertraline (ZOLOFT) tablet 25 mg  25 mg Oral Daily Cullen Grier MD   25 mg at 23 0818    enoxaparin Sodium (LOVENOX) injection 30 mg  30 mg SubCUTAneous BID BELKIS Centeno CNP   30 mg at 23 2123    ondansetron (ZOFRAN-ODT) disintegrating tablet 4 mg  4 mg Oral Q8H PRN BELKIS Centeno CNP        Or    ondansetron (ZOFRAN) injection 4 mg  4 mg IntraVENous Q6H PRN Maru Thierry Camacho, APRN - CNP   4 mg at 01/12/23 0852    polyethylene glycol (GLYCOLAX) packet 17 g  17 g Oral Daily PRN BELKIS Nolen CNP        insulin glargine (LANTUS) injection vial 50 Units  50 Units SubCUTAneous Nightly BELKIS Day - CNP   50 Units at 01/11/23 2131    insulin lispro (HUMALOG) injection vial 0-8 Units  0-8 Units SubCUTAneous TID WC BELKIS Day CNP        insulin lispro (HUMALOG) injection vial 0-4 Units  0-4 Units SubCUTAneous Nightly BELKIS Day CNP        glucose chewable tablet 16 g  4 tablet Oral PRN BELKIS Day CNP        dextrose bolus 10% 125 mL  125 mL IntraVENous PRN BELKIS Day CNP        Or    dextrose bolus 10% 250 mL  250 mL IntraVENous PRN BELKIS Day CNP        glucagon (rDNA) injection 1 mg  1 mg SubCUTAneous PRN BELKIS Day CNP        dextrose 10 % infusion   IntraVENous Continuous PRN BELKIS Nolen CNP        spironolactone (ALDACTONE) tablet 50 mg  50 mg Oral Daily Flori Baptiste MD   50 mg at 01/12/23 0818       No Known Allergies     Review of Systems is negative except for symptoms mentioned in HPI. PHYSICAL EXAMINATION:   VITAL SIGNS: /62   Pulse 79   Temp 97.6 °F (36.4 °C) (Oral)   Resp 16   Ht 5' 8\" (1.727 m)   Wt 268 lb (121.6 kg)   SpO2 91%   BMI 40.75 kg/m²     (4) Completely disabled, unable to carry out self-care and confined to bed or chair    GENERAL: In no acute distress, poorly nourished, well- developed,alert and oriented to person place and time. SKIN: Warm and dry, without jaundice, ecchymoses, or petechiae. HEENT: Normocephalic, sclera slightly icteric, oral mucosa moist without lesion or exudate in the visible oral cavity or oropharynx, tongue mid-line with good mobility and no deviation with extension. NODES: No palpable adenopathy in the neck Levels I-V, bilateral   Supraclavicular fossae, axillary chains, or inguinal regions.   LUNGS: Good inspiratory effort, no accessory muscle use, clear bilaterally, no focal wheeze, rales or rhonchi. CARDIAC: Regular rate and rhythm, without murmurs, rubs or gallops. ABDOMINAL: Normal bowel soundspresent, Distended . RUQ tenderness noted . Remote surgical scar in RLQ. Huge ascites noted . Difficult to appreciate hepatosplenomegaly . MUSKL: Decreased muscle mass   GENITALS: Deferred  RECTAL: Deferred   EXTREMITIES:generalized edema noted   NEUROLOGIC: No grossly apparent focal deficits. He is depressed .     LAB RESULTS:  Recent Results (from the past 24 hour(s))   POCT Glucose    Collection Time: 01/12/23 12:30 PM   Result Value Ref Range    POC Glucose 82 70 - 99 mg/dl    Performed on ACCU-CHEK    POCT Glucose    Collection Time: 01/12/23  4:41 PM   Result Value Ref Range    POC Glucose 92 70 - 99 mg/dl    Performed on ACCU-CHEK    POCT Glucose    Collection Time: 01/12/23  8:14 PM   Result Value Ref Range    POC Glucose 103 (H) 70 - 99 mg/dl    Performed on ACCU-CHEK    POCT Glucose    Collection Time: 01/13/23  2:15 AM   Result Value Ref Range    POC Glucose 117 (H) 70 - 99 mg/dl    Performed on ACCU-CHEK    CBC with Auto Differential    Collection Time: 01/13/23  4:59 AM   Result Value Ref Range    WBC 8.2 4.8 - 10.8 K/uL    RBC 4.99 4.70 - 6.10 M/uL    Hemoglobin 14.9 14.0 - 18.0 g/dL    Hematocrit 45.6 42.0 - 52.0 %    MCV 91.5 79.0 - 92.2 fL    MCH 29.9 27.0 - 31.3 pg    MCHC 32.7 (L) 33.0 - 37.0 %    RDW 19.0 (H) 11.5 - 14.5 %    Platelets 433 473 - 039 K/uL    Neutrophils % 79.4 %    Lymphocytes % 12.9 %    Monocytes % 7.3 %    Eosinophils % 0.3 %    Basophils % 0.1 %    Neutrophils Absolute 6.5 1.4 - 6.5 K/uL    Lymphocytes Absolute 1.1 1.0 - 4.8 K/uL    Monocytes Absolute 0.6 0.2 - 0.8 K/uL    Eosinophils Absolute 0.0 0.0 - 0.7 K/uL    Basophils Absolute 0.0 0.0 - 0.2 K/uL   Comprehensive Metabolic Panel w/ Reflex to MG    Collection Time: 01/13/23  4:59 AM   Result Value Ref Range    Sodium 136 135 - 144 mEq/L Potassium reflex Magnesium 5.8 (H) 3.4 - 4.9 mEq/L    Chloride 99 95 - 107 mEq/L    CO2 21 20 - 31 mEq/L    Anion Gap 16 (H) 9 - 15 mEq/L    Glucose 110 (H) 70 - 99 mg/dL    BUN 53 (H) 8 - 23 mg/dL    Creatinine 2.44 (H) 0.70 - 1.20 mg/dL    Est, Glom Filt Rate 26.2 (L) >60    Calcium 8.4 (L) 8.5 - 9.9 mg/dL    Total Protein 6.0 (L) 6.3 - 8.0 g/dL    Albumin 2.0 (L) 3.5 - 4.6 g/dL    Total Bilirubin 2.0 (H) 0.2 - 0.7 mg/dL    Alkaline Phosphatase 187 (H) 35 - 104 U/L    ALT 21 0 - 41 U/L    AST 43 (H) 0 - 40 U/L    Globulin 4.0 (H) 2.3 - 3.5 g/dL   POCT Glucose    Collection Time: 01/13/23  6:07 AM   Result Value Ref Range    POC Glucose 101 (H) 70 - 99 mg/dl    Performed on ACCU-CHEK      Recent Labs     01/13/23  0459   GLUCOSE 110*     Contains abnormal data AFP Tumor Marker  Order: [id number]  Status: Final result    Visible to patient: No (not released)    Next appt: None    0 Result Notes  Component Ref Range & Units 1/10/23  8:21 PM    AFP (Alpha Fetoprotein) <8.4 ug/L 608.5 High     Comment: The Roche \"ECLIA\" assay is used. Results obtained with different assay   methods cannot be used interchangeably. PATH CONSULT HEMO BODY FLD  Order: [id number]  Status: Final result    Visible to patient: No (not released)    Next appt: None    0 Result Notes  Component 1/10/23  3:33 PM    Path Consult Fluid Reviewed    Comment: The slide is reviewed. Malignant cells are not identified. DORENE         Pathology:     RADIOLOGY RESULTS:  CT Head W/O Contrast    Result Date: 1/9/2023  EXAMINATION: CT OF THE HEAD WITHOUT CONTRAST  1/8/2023 7:50 pm TECHNIQUE: CT of the head was performed without the administration of intravenous contrast. Automated exposure control, iterative reconstruction, and/or weight based adjustment of the mA/kV was utilized to reduce the radiation dose to as low as reasonably achievable. COMPARISON: None.  HISTORY: ORDERING SYSTEM PROVIDED HISTORY: AMS TECHNOLOGIST PROVIDED HISTORY: Reason for exam:->AMS Has a \"code stroke\" or \"stroke alert\" been called? ->No Decision Support Exception - unselect if not a suspected or confirmed emergency medical condition->Emergency Medical Condition (MA) What reading provider will be dictating this exam?->CRC FINDINGS: BRAIN/VENTRICLES: There is no acute intracranial hemorrhage, mass effect or midline shift. No abnormal extra-axial fluid collection. The gray-white differentiation is maintained without evidence of an acute infarct. There is no evidence of hydrocephalus. ORBITS: The visualized portion of the orbits demonstrate no acute abnormality. SINUSES: The visualized paranasal sinuses and mastoid air cells demonstrate no acute abnormality. SOFT TISSUES/SKULL:  No acute abnormality of the visualized skull or soft tissues. No acute intracranial abnormality. CT ABDOMEN PELVIS W IV CONTRAST Additional Contrast? None    Result Date: 1/10/2023  EXAMINATION: CT OF THE ABDOMEN AND PELVIS WITH CONTRAST 1/10/2023 9:41 am TECHNIQUE: CT of the abdomen and pelvis was performed with the administration of intravenous contrast. Multiplanar reformatted images are provided for review. Automated exposure control, iterative reconstruction, and/or weight based adjustment of the mA/kV was utilized to reduce the radiation dose to as low as reasonably achievable. COMPARISON: CTA chest dated 06/09/2022 HISTORY: ORDERING SYSTEM PROVIDED HISTORY: CIRRHOSIS TECHNOLOGIST PROVIDED HISTORY: Reason for exam:->CIRRHOSIS Additional Contrast?->None What reading provider will be dictating this exam?->CRC FINDINGS: Lower Chest: Lung bases are clear. Organs: Liver is heterogeneous with multifocal areas of hypoenhancement including a coalescent area occupying much of the right hepatic lobe 14 cm in dimension of indeterminate significance although hepatocellular carcinoma of diffuse involvement not excluded with multifocal other areas scattered throughout. Perihepatic ascites.   Gallbladder contains numerous stones in the dependent portion neck of the gallbladder of cholelithiasis without wall thickening. .  Pancreas unremarkable. Spleen enlarged to 14.8 cm in craniocaudal dimension. Adrenals without nodule. Kidneys without suspicious renal lesion and no hydronephrosis. Low-attenuation areas bilateral of renal cortical cysts as well as bilateral renal cortical scarring atrophy. GI/Bowel: No focal thickening or disproportion dilatation of bowel. No inflammatory findings. Pelvis: No suspicious pelvic lesion or bulky pelvic adenopathy/free fluid. Peritoneum/Retroperitoneum: Scattered mildly enlarged retroperitoneal lymph nodes measuring up to a periaortic left 17 mm lymph node. Aortocaval 2 cm lymph node along with 2 cm periportal lymph nodes. Mesenteric edema with moderate volume abdominopelvic ascites. Vasculature: Grossly normal caliber of abdominal aorta and vasculature Bones/Soft Tissues: No acute osseous or soft tissue findings. Fat containing left direct inguinal hernia. Progressive hepatic disease with low-attenuation areas scattered throughout diffusely becoming coalescent or confluent up to 14 cm in the right hepatic lobe occupying much of the right hepatic dome appears grossly progressed from 06/09/2022 CTA chest comparison with partial imaging was performed of the upper abdomen. Underline hepatocellular carcinoma with diffuse involvement not excluded. Perihepatic ascites with sequela portal hypertension including splenomegaly. Periportal and retroperitoneal adenopathy additionally noted. Moderate ascites     XR CHEST PORTABLE    Result Date: 1/8/2023  EXAMINATION: ONE XRAY VIEW OF THE CHEST 1/8/2023 7:44 pm COMPARISON: None. HISTORY: ORDERING SYSTEM PROVIDED HISTORY: AMS TECHNOLOGIST PROVIDED HISTORY: Reason for exam:->AMS What reading provider will be dictating this exam?->CRC FINDINGS: There is low lung volumes.   There is minimal atelectasis in the lung bases with possible developing infiltrates and pleural effusion in the left base. Low lung volumes with atelectasis/possible developing left basilar pneumonia. IR US GUIDED PARACENTESIS    1. Status post technically successful ultrasound-guided paracentesis. Zo Cole is a Male of 76 years age, referred for Ultrasound Guided Paracentesis. PROCEDURE: Survey of the abdomen showed large amount of ascites fluid. After obtaining informed consent, the patient was positioned supine on the sonography table. Using ultrasound, the skin over the left hemiabdomen was locally anesthetized with 1% lidocaine. Following that, a Yueh needle was advanced into the fluid pocket using ultrasound visualization. 7850cc, of clear yellow fluid were aspirated and sent for cytology, and pathology. The needle was removed, and hemostasis was obtained with pressure. A Band-Aid was placed. Post procedure images did not demonstrate hemorrhage at the target site. The patient tolerated the procedure well. The patient left the department in good condition. A radiology nurse was in presence monitoring vital signs, assisting throughout the procedure. US DUP ABD PEL RETRO SCROT COMPLETE    Result Date: 1/9/2023  EXAMINATION: DOPPLER EVALUATION OF THE PELVIS 1/9/2023 12:34 pm COMPARISON: None. HISTORY: ORDERING SYSTEM PROVIDED HISTORY: elevated LFTS TECHNOLOGIST PROVIDED HISTORY: Reason for exam:->elevated LFTS What reading provider will be dictating this exam?->CRC FINDINGS: Echogenic liver with nodular contour. Perihepatic ascites. A 1.3 cm cyst is noted in the liver, which appears to be in the right lobe. Color Doppler flow is noted in the main hepatic vein, hepatic artery, and left hepatic vein. The main portal vein and right portal vein demonstrate irregular color Doppler flow on this exam.  The right and left hepatic veins are suboptimally visualized. The splenic vein is suboptimally visualized.   The flow in the vessel labeled hepatic artery appears turbulent and high velocity. Cholelithiasis and gallbladder sludge noted. No pericholecystic fluid or obvious gallbladder wall thickening. Pam Patella sign was not documented on technologist where she. Suboptimal visualization of the IVC. Four-quadrant ascites. Findings suggestive of cirrhosis. There is moderate to large volume of ascites. Limited visualization of the portal venous system demonstrates color Doppler flow in the left portal vein, but irregular color Doppler flow in the main and right portal veins. Consider CT of the abdomen/pelvis with IV contrast for further evaluation of the portal venous system. US DUP LOWER EXTREMITIES BILATERAL VENOUS    Result Date: 1/9/2023  EXAMINATION: DUPLEX VENOUS ULTRASOUND OF THE BILATERAL LOWER EXTREMITIES1/9/2023 9:45 am TECHNIQUE: Duplex ultrasound using B-mode/gray scaled imaging, Doppler spectral analysis and color flow Doppler was obtained of the deep venous structures of the lower bilateral extremities. COMPARISON: None. HISTORY: ORDERING SYSTEM PROVIDED HISTORY: edema r/o DVT TECHNOLOGIST PROVIDED HISTORY: Reason for exam:->edema r/o DVT What reading provider will be dictating this exam?->CRC FINDINGS: The visualized veins of the bilateral lower extremities are patent and free of echogenic thrombus. The veins demonstrate good compressibility with normal color flow study and spectral analysis. No evidence of DVT in either lower extremity. .     ASSESSMENT AND PLAN  Hepatocellular Carcinoma more likely. He seems to have metastatic retroperitoneal lymphadenopathy. Cirrhosis of Liver with Portal Hypertension   Severe Depression . This could be a paraneoplastic manifestation . 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 Hypoproteinemia   Malignant Cachexia    Recommendations : I discussed all the findings so far with the patient and the need for Liver biopsy to establish the diagnosis .  Patient consents to have Liver biopsy . After the diagnosis is established, I shall discuss further management with Interventional Radiology. If He is not a candidate for Non invasive procedures, Patient may be treated with Immunotherapy + Targeted therapy . His Prognosis is extremely poor . Thanks for this consultation .       Electronically signed by Cuate Baugh MD on 1/13/2023 at 9:16 AM

## 2023-01-13 NOTE — CARE COORDINATION
LSW went in to patient's room to discuss discharge planning. Patient said he just received bad news from the doctor and asked LSW to return later.

## 2023-01-13 NOTE — PROGRESS NOTES
Hospitalist Progress Note      PCP: [address] [facility]    Date of Admission: 1/8/2023    Chief Complaint: weakness/depression    Subjective: pt awake/alert     Medications:  Reviewed    Infusion Medications    sodium chloride 100 mL/hr at 01/12/23 2307    dextrose       Scheduled Medications    sodium polystyrene  15 g Oral Once    scopolamine  1 patch TransDERmal Q72H    [Held by provider] furosemide  20 mg Oral BID    sertraline  25 mg Oral Daily    enoxaparin  30 mg SubCUTAneous BID    insulin glargine  50 Units SubCUTAneous Nightly    insulin lispro  0-8 Units SubCUTAneous TID WC    insulin lispro  0-4 Units SubCUTAneous Nightly    spironolactone  50 mg Oral Daily     PRN Meds: traMADol, ondansetron **OR** ondansetron, polyethylene glycol, glucose, dextrose bolus **OR** dextrose bolus, glucagon (rDNA), dextrose      Intake/Output Summary (Last 24 hours) at 1/13/2023 0943  Last data filed at 1/12/2023 1323  Gross per 24 hour   Intake --   Output 50 ml   Net -50 ml       Exam:    /62   Pulse 79   Temp 97.6 °F (36.4 °C) (Oral)   Resp 16   Ht 5' 8\" (1.727 m)   Wt 268 lb (121.6 kg)   SpO2 91%   BMI 40.75 kg/m²     General appearance: No apparent distress, appears stated age and cooperative. HEENT: Pupils equal, round, and reactive to light. Conjunctivae/corneas clear. Neck: Supple, with full range of motion. No jugular venous distention. Trachea midline. Respiratory:  Normal respiratory effort. Clear to auscultation, bilaterally without Rales/Wheezes/Rhonchi. Cardiovascular: Regular rate and rhythm with normal S1/S2 without murmurs, rubs or gallops. Abdomen: Soft,  distended with normal bowel sounds. Musculoskeletal:   edema bilaterally. Full range of motion without deformity. Skin: Skin color, texture, turgor normal.  No rashes or lesions. Neurologic:  Neurovascularly intact without any focal sensory/motor deficits.  Cranial nerves: II-XII intact, grossly non-focal.  Psychiatric: Alert and oriented, thought content appropriate, normal insight  Capillary Refill: Brisk,< 3 seconds   Peripheral Pulses: +2 palpable, equal bilaterally       Labs:   Recent Labs     01/11/23 0459 01/12/23 0503 01/13/23 0459   WBC 10.1 9.7 8.2   HGB 14.1 14.8 14.9   HCT 43.5 45.2 45.6    161 156     Recent Labs     01/11/23 0459 01/12/23 0659 01/13/23 0459    136 136   K 4.3 5.1* 5.8*    101 99   CO2 24 24 21   BUN 34* 45* 53*   CREATININE 1.46* 1.98* 2.44*   CALCIUM 8.3* 8.6 8.4*     Recent Labs     01/11/23 0459 01/12/23 0659 01/13/23 0459   AST 36 34 43*   ALT 21 20 21   BILITOT 1.8* 2.0* 2.0*   ALKPHOS 203* 200* 187*     No results for input(s): INR in the last 72 hours. No results for input(s): Reyne Irish in the last 72 hours. Urinalysis:      Lab Results   Component Value Date/Time    NITRU Negative 01/08/2023 06:45 PM    WBCUA 3-5 01/08/2023 06:45 PM    BACTERIA Negative 01/08/2023 06:45 PM    RBCUA 10-20 01/08/2023 06:45 PM    BLOODU SMALL 01/08/2023 06:45 PM    SPECGRAV 1.021 01/08/2023 06:45 PM    GLUCOSEU Negative 01/08/2023 06:45 PM       Radiology:  IR US GUIDED PARACENTESIS   Final Result   1. Status post technically successful ultrasound-guided paracentesis. Kristofer Swanson is a Male of 76 years age, referred for Ultrasound Guided Paracentesis. PROCEDURE: Survey of the abdomen showed large amount of ascites fluid. After obtaining informed consent, the patient was positioned supine on the sonography table. Using ultrasound, the skin over the left hemiabdomen was locally anesthetized with 1% lidocaine. Following that, a Yueh needle was advanced into the fluid    pocket using ultrasound visualization. 7850cc, of clear yellow fluid were aspirated and sent for cytology, and pathology. The needle was removed, and hemostasis was obtained with pressure. A Band-Aid was placed.      Post procedure images did not demonstrate hemorrhage at the target site. The patient tolerated the procedure well. The patient left the department in good condition. A radiology nurse was in presence monitoring vital signs, assisting throughout the procedure. CT ABDOMEN PELVIS W IV CONTRAST Additional Contrast? None   Final Result   Progressive hepatic disease with low-attenuation areas scattered throughout   diffusely becoming coalescent or confluent up to 14 cm in the right hepatic   lobe occupying much of the right hepatic dome appears grossly progressed from   06/09/2022 CTA chest comparison with partial imaging was performed of the   upper abdomen. Underline hepatocellular carcinoma with diffuse involvement   not excluded. Perihepatic ascites with sequela portal hypertension including   splenomegaly. Periportal and retroperitoneal adenopathy additionally noted. Moderate ascites         US DUP ABD PEL RETRO SCROT COMPLETE   Final Result   Findings suggestive of cirrhosis. There is moderate to large volume of   ascites. Limited visualization of the portal venous system demonstrates color Doppler   flow in the left portal vein, but irregular color Doppler flow in the main   and right portal veins. Consider CT of the abdomen/pelvis with IV contrast   for further evaluation of the portal venous system. US DUP LOWER EXTREMITIES BILATERAL VENOUS   Final Result   No evidence of DVT in either lower extremity. CT Head W/O Contrast   Final Result   No acute intracranial abnormality. XR CHEST PORTABLE   Final Result   Low lung volumes with atelectasis/possible developing left basilar pneumonia. Assessment/Plan:    Active Hospital Problems    Diagnosis Date Noted    General weakness [R53.1] 01/09/2023     Priority: Medium    Suicidal ideation [R45.851] 01/08/2023     Priority: Medium         DVT Prophylaxis: lovenox  Diet: ADULT DIET;  Regular; 5 carb choices (75 gm/meal); Low Sodium (2 gm); Safety Tray; Safety Tray (Disposables)  Code Status: Full Code    PT/OT Eval Status: done    Dispo - Ascites/elevated liver enzymes/hepatocellular CA? -post paracentesis, appreciate GI/oncology recommendations    prognosis appears to be grave. Nausea/poor PO intake- initiating mild IV hydration, adding scopolamine patch  SALOME/CKD- hold lasix, continue IV NS, follow up with BMP    Depression/suicidal- per psych.  1/1 sitter removed, May needs placement at DC  Morbid obesity with BMI 40%- supportive care  DM- lantus/ISS  Medically stable for acute admission, will follow up along with consultants       Electronically signed by Philip Young MD on 1/13/2023 at 9:43 AM

## 2023-01-13 NOTE — FLOWSHEET NOTE
Myles Will RN made aware of paracentesis (1/17 -1430) and liver mass bx procedure (1/17 -1500) date and time. Stated she would pass information along.

## 2023-01-14 LAB
ALBUMIN SERPL-MCNC: 2.1 G/DL (ref 3.5–4.6)
ALP BLD-CCNC: 181 U/L (ref 35–104)
ALT SERPL-CCNC: 26 U/L (ref 0–41)
ANION GAP SERPL CALCULATED.3IONS-SCNC: 14 MEQ/L (ref 9–15)
AST SERPL-CCNC: 47 U/L (ref 0–40)
BASOPHILS ABSOLUTE: 0.1 K/UL (ref 0–0.2)
BASOPHILS RELATIVE PERCENT: 0.7 %
BILIRUB SERPL-MCNC: 1.6 MG/DL (ref 0.2–0.7)
BLOOD CULTURE, ROUTINE: NORMAL
BLOOD CULTURE, ROUTINE: NORMAL
BUN BLDV-MCNC: 59 MG/DL (ref 8–23)
CALCIUM SERPL-MCNC: 8.1 MG/DL (ref 8.5–9.9)
CHLORIDE BLD-SCNC: 102 MEQ/L (ref 95–107)
CO2: 22 MEQ/L (ref 20–31)
CREAT SERPL-MCNC: 2.36 MG/DL (ref 0.7–1.2)
EOSINOPHILS ABSOLUTE: 0 K/UL (ref 0–0.7)
EOSINOPHILS RELATIVE PERCENT: 0.6 %
GFR SERPL CREATININE-BSD FRML MDRD: 27.3 ML/MIN/{1.73_M2}
GLOBULIN: 3.6 G/DL (ref 2.3–3.5)
GLUCOSE BLD-MCNC: 100 MG/DL (ref 70–99)
GLUCOSE BLD-MCNC: 100 MG/DL (ref 70–99)
GLUCOSE BLD-MCNC: 103 MG/DL (ref 70–99)
GLUCOSE BLD-MCNC: 109 MG/DL (ref 70–99)
GLUCOSE BLD-MCNC: 110 MG/DL (ref 70–99)
GLUCOSE BLD-MCNC: 117 MG/DL (ref 70–99)
HCT VFR BLD CALC: 43.7 % (ref 42–52)
HEMOGLOBIN: 14.6 G/DL (ref 14–18)
LYMPHOCYTES ABSOLUTE: 1 K/UL (ref 1–4.8)
LYMPHOCYTES RELATIVE PERCENT: 12.7 %
MCH RBC QN AUTO: 30 PG (ref 27–31.3)
MCHC RBC AUTO-ENTMCNC: 33.5 % (ref 33–37)
MCV RBC AUTO: 89.6 FL (ref 79–92.2)
MONOCYTES ABSOLUTE: 0.7 K/UL (ref 0.2–0.8)
MONOCYTES RELATIVE PERCENT: 8.3 %
NEUTROPHILS ABSOLUTE: 6.1 K/UL (ref 1.4–6.5)
NEUTROPHILS RELATIVE PERCENT: 77.7 %
PDW BLD-RTO: 18.6 % (ref 11.5–14.5)
PERFORMED ON: ABNORMAL
PLATELET # BLD: 167 K/UL (ref 130–400)
POTASSIUM REFLEX MAGNESIUM: 5.3 MEQ/L (ref 3.4–4.9)
RBC # BLD: 4.88 M/UL (ref 4.7–6.1)
REASON FOR REJECTION: NORMAL
REJECTED TEST: NORMAL
SODIUM BLD-SCNC: 138 MEQ/L (ref 135–144)
TOTAL PROTEIN: 5.7 G/DL (ref 6.3–8)
WBC # BLD: 7.9 K/UL (ref 4.8–10.8)

## 2023-01-14 PROCEDURE — 6370000000 HC RX 637 (ALT 250 FOR IP): Performed by: SPECIALIST

## 2023-01-14 PROCEDURE — 6370000000 HC RX 637 (ALT 250 FOR IP): Performed by: PSYCHIATRY & NEUROLOGY

## 2023-01-14 PROCEDURE — 6370000000 HC RX 637 (ALT 250 FOR IP)

## 2023-01-14 PROCEDURE — 2060000000 HC ICU INTERMEDIATE R&B

## 2023-01-14 PROCEDURE — 2580000003 HC RX 258: Performed by: INTERNAL MEDICINE

## 2023-01-14 PROCEDURE — 6360000002 HC RX W HCPCS

## 2023-01-14 PROCEDURE — 6370000000 HC RX 637 (ALT 250 FOR IP): Performed by: INTERNAL MEDICINE

## 2023-01-14 PROCEDURE — 85025 COMPLETE CBC W/AUTO DIFF WBC: CPT

## 2023-01-14 PROCEDURE — 36415 COLL VENOUS BLD VENIPUNCTURE: CPT

## 2023-01-14 PROCEDURE — 80053 COMPREHEN METABOLIC PANEL: CPT

## 2023-01-14 RX ORDER — SODIUM POLYSTYRENE SULFONATE 15 G/60ML
15 SUSPENSION ORAL; RECTAL ONCE
Status: COMPLETED | OUTPATIENT
Start: 2023-01-14 | End: 2023-01-14

## 2023-01-14 RX ADMIN — SPIRONOLACTONE 50 MG: 50 TABLET ORAL at 08:05

## 2023-01-14 RX ADMIN — SODIUM POLYSTYRENE SULFONATE 15 G: 15 SUSPENSION ORAL; RECTAL at 09:44

## 2023-01-14 RX ADMIN — SERTRALINE HYDROCHLORIDE 25 MG: 25 TABLET ORAL at 08:05

## 2023-01-14 RX ADMIN — INSULIN GLARGINE 50 UNITS: 100 INJECTION, SOLUTION SUBCUTANEOUS at 21:55

## 2023-01-14 RX ADMIN — TRAMADOL HYDROCHLORIDE 25 MG: 50 TABLET ORAL at 22:03

## 2023-01-14 RX ADMIN — ENOXAPARIN SODIUM 30 MG: 100 INJECTION SUBCUTANEOUS at 21:55

## 2023-01-14 RX ADMIN — ENOXAPARIN SODIUM 30 MG: 100 INJECTION SUBCUTANEOUS at 08:05

## 2023-01-14 RX ADMIN — SODIUM CHLORIDE: 9 INJECTION, SOLUTION INTRAVENOUS at 14:58

## 2023-01-14 ASSESSMENT — PAIN SCALES - GENERAL
PAINLEVEL_OUTOF10: 0
PAINLEVEL_OUTOF10: 9
PAINLEVEL_OUTOF10: 0
PAINLEVEL_OUTOF10: 0

## 2023-01-14 ASSESSMENT — PAIN DESCRIPTION - DESCRIPTORS: DESCRIPTORS: ACHING

## 2023-01-14 ASSESSMENT — PAIN DESCRIPTION - LOCATION: LOCATION: HIP

## 2023-01-14 ASSESSMENT — PAIN DESCRIPTION - ORIENTATION: ORIENTATION: RIGHT;LEFT

## 2023-01-14 NOTE — PROGRESS NOTES
Hospitalist Progress Note      PCP: [address] [facility]    Date of Admission: 1/8/2023    Chief Complaint: weakness    Subjective: pt awake/alert     Medications:  Reviewed    Infusion Medications    sodium chloride 100 mL/hr at 01/12/23 2307    dextrose       Scheduled Medications    scopolamine  1 patch TransDERmal Q72H    [Held by provider] furosemide  20 mg Oral BID    sertraline  25 mg Oral Daily    enoxaparin  30 mg SubCUTAneous BID    insulin glargine  50 Units SubCUTAneous Nightly    insulin lispro  0-8 Units SubCUTAneous TID WC    insulin lispro  0-4 Units SubCUTAneous Nightly    spironolactone  50 mg Oral Daily     PRN Meds: traMADol, ondansetron **OR** ondansetron, polyethylene glycol, glucose, dextrose bolus **OR** dextrose bolus, glucagon (rDNA), dextrose    No intake or output data in the 24 hours ending 01/14/23 0928    Exam:    /69   Pulse 78   Temp 97.2 °F (36.2 °C) (Oral)   Resp 18   Ht 5' 8\" (1.727 m)   Wt 268 lb (121.6 kg)   SpO2 95%   BMI 40.75 kg/m²     General appearance: No apparent distress, appears stated age and cooperative. HEENT: Pupils equal, round, and reactive to light. Conjunctivae/corneas clear. Neck: Supple, with full range of motion. No jugular venous distention. Trachea midline. Respiratory:  Normal respiratory effort. Clear to auscultation, bilaterally without Rales/Wheezes/Rhonchi. Cardiovascular: Regular rate and rhythm with normal S1/S2 without murmurs, rubs or gallops. Abdomen: Soft, non-tender, distended with normal bowel sounds. Musculoskeletal:  edema bilaterally. Full range of motion without deformity. Skin: Skin color, texture, turgor normal.  No rashes or lesions. Neurologic:  Neurovascularly intact without any focal sensory/motor deficits.  Cranial nerves: II-XII intact, grossly non-focal.  Psychiatric: Alert and oriented, thought content appropriate, normal insight  Capillary Refill: Brisk,< 3 seconds   Peripheral Pulses: +2 palpable, equal bilaterally       Labs:   Recent Labs     01/12/23  0503 01/13/23  0459 01/14/23  0454   WBC 9.7 8.2 7.9   HGB 14.8 14.9 14.6   HCT 45.2 45.6 43.7    156 167     Recent Labs     01/12/23  0659 01/13/23  0459 01/14/23  0821    136 138   K 5.1* 5.8* 5.3*    99 102   CO2 24 21 22   BUN 45* 53* 59*   CREATININE 1.98* 2.44* 2.36*   CALCIUM 8.6 8.4* 8.1*     Recent Labs     01/12/23  0659 01/13/23  0459 01/14/23  0821   AST 34 43* 47*   ALT 20 21 26   BILITOT 2.0* 2.0* 1.6*   ALKPHOS 200* 187* 181*     No results for input(s): INR in the last 72 hours. No results for input(s): Victor Manuel Khan in the last 72 hours. Urinalysis:      Lab Results   Component Value Date/Time    NITRU Negative 01/08/2023 06:45 PM    WBCUA 3-5 01/08/2023 06:45 PM    BACTERIA Negative 01/08/2023 06:45 PM    RBCUA 10-20 01/08/2023 06:45 PM    BLOODU SMALL 01/08/2023 06:45 PM    SPECGRAV 1.021 01/08/2023 06:45 PM    GLUCOSEU Negative 01/08/2023 06:45 PM       Radiology:  IR US GUIDED PARACENTESIS   Final Result   1. Status post technically successful ultrasound-guided paracentesis. Jania Rodriguez is a Male of 76 years age, referred for Ultrasound Guided Paracentesis. PROCEDURE: Survey of the abdomen showed large amount of ascites fluid. After obtaining informed consent, the patient was positioned supine on the sonography table. Using ultrasound, the skin over the left hemiabdomen was locally anesthetized with 1% lidocaine. Following that, a Yueh needle was advanced into the fluid    pocket using ultrasound visualization. 7850cc, of clear yellow fluid were aspirated and sent for cytology, and pathology. The needle was removed, and hemostasis was obtained with pressure. A Band-Aid was placed. Post procedure images did not demonstrate hemorrhage at the target site. The patient tolerated the procedure well.    The patient left the department in good condition. A radiology nurse was in presence monitoring vital signs, assisting throughout the procedure. CT ABDOMEN PELVIS W IV CONTRAST Additional Contrast? None   Final Result   Progressive hepatic disease with low-attenuation areas scattered throughout   diffusely becoming coalescent or confluent up to 14 cm in the right hepatic   lobe occupying much of the right hepatic dome appears grossly progressed from   06/09/2022 CTA chest comparison with partial imaging was performed of the   upper abdomen. Underline hepatocellular carcinoma with diffuse involvement   not excluded. Perihepatic ascites with sequela portal hypertension including   splenomegaly. Periportal and retroperitoneal adenopathy additionally noted. Moderate ascites         US DUP ABD PEL RETRO SCROT COMPLETE   Final Result   Findings suggestive of cirrhosis. There is moderate to large volume of   ascites. Limited visualization of the portal venous system demonstrates color Doppler   flow in the left portal vein, but irregular color Doppler flow in the main   and right portal veins. Consider CT of the abdomen/pelvis with IV contrast   for further evaluation of the portal venous system. US DUP LOWER EXTREMITIES BILATERAL VENOUS   Final Result   No evidence of DVT in either lower extremity. CT Head W/O Contrast   Final Result   No acute intracranial abnormality. XR CHEST PORTABLE   Final Result   Low lung volumes with atelectasis/possible developing left basilar pneumonia. Assessment/Plan:    Active Hospital Problems    Diagnosis Date Noted    General weakness [R53.1] 01/09/2023     Priority: Medium    Suicidal ideation [R45.851] 01/08/2023     Priority: Medium         DVT Prophylaxis: lovenox  Diet: ADULT DIET; Regular; 5 carb choices (75 gm/meal); Low Sodium (2 gm);  Safety Tray; Safety Tray (Disposables)  Code Status: Full Code    PT/OT Eval Status: done    Dispo - Ascites/elevated liver enzymes/hepatocellular CA? -post paracentesis, appreciate GI/oncology recommendations-plan for another paracentesis/liver biopsy on Tuesday next week     prognosis appears to be grave. Nausea/poor PO intake- initiating mild IV hydration, adding scopolamine patch  Hyperkalemia- kayexalate today, BMP AM   SALOME/CKD- hold lasix, continue IV NS, follow up with BMP    Depression/suicidal- per psych.  1/1 sitter removed, May needs placement at [place]  Morbid obesity with BMI 40%- supportive care  DM- lantus/ISS  Medically stable for acute admission, will follow up along with consultants         Called his sister- ms Erwin Mcelroy [phone], questions answered, update given       Electronically signed by Robert Bashir MD on 1/14/2023 at 9:28 AM

## 2023-01-14 NOTE — PROGRESS NOTES
Physical Therapy Missed Treatment   Facility/Department: [facility] MED SURG [id number]    NAME: Jean Paul Bradley    : 1945 (89 y.o.)  MRN: [id number]    Account: [de-identified]  Gender: male    Chart reviewed, attempted PT at 9946. Patient unavailable 2° to:    [] Hold per ns request    [x] Pt declined pt unwilling to participate at this time despite max encouragement and education pt reports he is too tired and just can't do it at this time. Pt states he would be agreeable if PT could return at a later time. [] Pt. . off floor for test/procedure. [] Pt. Unavailable       Will attempt PT treatment again at earliest convenience.       Electronically signed by Mateus Hoffmann PTA on 23 at 9:34 AM EST

## 2023-01-14 NOTE — PROGRESS NOTES
Nursing report received from first shift nurse. Pt in bed resting. Pt Currently denies pain. Wearing O2 via nasal canula. Bed in lowest position. Side rails x2, call light in reach. Nurse will monitor Pt throughout the night     Pt Lantus held per Dr. Afshan Hernandez.  Pt accu check was 97

## 2023-01-14 NOTE — PROGRESS NOTES
[facility] Occupational Therapy      Date: 2023  Patient Name: Ethel Nelson        MRN: [id number]  Account: [de-identified]   : 1945  (76 y.o.)  Room: [place] [address]    Chart reviewed, attempted OT at [phone]. Patient not seen 2° to:    Pt in bed resting upon arrival. Pt reporting significant fatigue this afternoon. Encouraged pt to participate in ADL tasks, however, he declined. Pt declined any therapy at this time reporting that he did not sleep well last night and would like to rest.      Will attempt again when able.     Electronically signed by BERNABE Solorio on 2023 at 3:37 PM

## 2023-01-14 NOTE — PROGRESS NOTES
Physical Therapy Missed Treatment   Facility/Department: [facility] MED SURG [id number]    NAME: Cristopher Green    : 1945 (38 y.o.)  MRN: [id number]    Account: [de-identified]  Gender: male    Chart reviewed, attempted PT at 200. Patient unavailable 2° to:    [] Hold per nsg request    [x] Pt declined pt unwilling to work with therapy stating \"I just went through hell. \" PCA confirms pt only rolled side to side. Max education and encouragement given, pt still unwilling. This is the second time today pt declined to participate. [x] Nsg notified   [] Other notified    [] Pt. . off floor for test/procedure. [] Pt. Unavailable       Will attempt PT treatment again at earliest convenience.       Electronically signed by Marlyn Mitchell PTA on 23 at 11:45 AM EST

## 2023-01-15 LAB
ALBUMIN SERPL-MCNC: 2.1 G/DL (ref 3.5–4.6)
ALP BLD-CCNC: 197 U/L (ref 35–104)
ALT SERPL-CCNC: 29 U/L (ref 0–41)
ANION GAP SERPL CALCULATED.3IONS-SCNC: 17 MEQ/L (ref 9–15)
AST SERPL-CCNC: 50 U/L (ref 0–40)
BASOPHILS ABSOLUTE: 0 K/UL (ref 0–0.2)
BASOPHILS RELATIVE PERCENT: 0 %
BILIRUB SERPL-MCNC: 1.5 MG/DL (ref 0.2–0.7)
BUN BLDV-MCNC: 59 MG/DL (ref 8–23)
CALCIUM SERPL-MCNC: 8.6 MG/DL (ref 8.5–9.9)
CHLORIDE BLD-SCNC: 102 MEQ/L (ref 95–107)
CO2: 20 MEQ/L (ref 20–31)
CREAT SERPL-MCNC: 2.3 MG/DL (ref 0.7–1.2)
EOSINOPHILS ABSOLUTE: 0 K/UL (ref 0–0.7)
EOSINOPHILS RELATIVE PERCENT: 0.1 %
GFR SERPL CREATININE-BSD FRML MDRD: 28.1 ML/MIN/{1.73_M2}
GLOBULIN: 4 G/DL (ref 2.3–3.5)
GLUCOSE BLD-MCNC: 100 MG/DL (ref 70–99)
GLUCOSE BLD-MCNC: 111 MG/DL (ref 70–99)
GLUCOSE BLD-MCNC: 78 MG/DL (ref 70–99)
GLUCOSE BLD-MCNC: 83 MG/DL (ref 70–99)
GLUCOSE BLD-MCNC: 84 MG/DL (ref 70–99)
HCT VFR BLD CALC: 45.4 % (ref 42–52)
HEMOGLOBIN: 15.1 G/DL (ref 14–18)
LYMPHOCYTES ABSOLUTE: 1 K/UL (ref 1–4.8)
LYMPHOCYTES RELATIVE PERCENT: 13 %
MCH RBC QN AUTO: 30.1 PG (ref 27–31.3)
MCHC RBC AUTO-ENTMCNC: 33.2 % (ref 33–37)
MCV RBC AUTO: 90.5 FL (ref 79–92.2)
MONOCYTES ABSOLUTE: 0.7 K/UL (ref 0.2–0.8)
MONOCYTES RELATIVE PERCENT: 8.5 %
NEUTROPHILS ABSOLUTE: 6.2 K/UL (ref 1.4–6.5)
NEUTROPHILS RELATIVE PERCENT: 78.4 %
PDW BLD-RTO: 19.2 % (ref 11.5–14.5)
PERFORMED ON: ABNORMAL
PERFORMED ON: NORMAL
PLATELET # BLD: 176 K/UL (ref 130–400)
POTASSIUM REFLEX MAGNESIUM: 4.6 MEQ/L (ref 3.4–4.9)
POTASSIUM SERPL-SCNC: 4.6 MEQ/L (ref 3.4–4.9)
RBC # BLD: 5.01 M/UL (ref 4.7–6.1)
SODIUM BLD-SCNC: 139 MEQ/L (ref 135–144)
TOTAL PROTEIN: 6.1 G/DL (ref 6.3–8)
WBC # BLD: 7.9 K/UL (ref 4.8–10.8)

## 2023-01-15 PROCEDURE — 6370000000 HC RX 637 (ALT 250 FOR IP): Performed by: SPECIALIST

## 2023-01-15 PROCEDURE — 6370000000 HC RX 637 (ALT 250 FOR IP): Performed by: INTERNAL MEDICINE

## 2023-01-15 PROCEDURE — 6370000000 HC RX 637 (ALT 250 FOR IP): Performed by: PSYCHIATRY & NEUROLOGY

## 2023-01-15 PROCEDURE — 2580000003 HC RX 258: Performed by: INTERNAL MEDICINE

## 2023-01-15 PROCEDURE — 80053 COMPREHEN METABOLIC PANEL: CPT

## 2023-01-15 PROCEDURE — 2060000000 HC ICU INTERMEDIATE R&B

## 2023-01-15 PROCEDURE — 36415 COLL VENOUS BLD VENIPUNCTURE: CPT

## 2023-01-15 PROCEDURE — 6360000002 HC RX W HCPCS

## 2023-01-15 PROCEDURE — 85025 COMPLETE CBC W/AUTO DIFF WBC: CPT

## 2023-01-15 RX ADMIN — ENOXAPARIN SODIUM 30 MG: 100 INJECTION SUBCUTANEOUS at 08:31

## 2023-01-15 RX ADMIN — SODIUM CHLORIDE: 9 INJECTION, SOLUTION INTRAVENOUS at 20:22

## 2023-01-15 RX ADMIN — SERTRALINE HYDROCHLORIDE 25 MG: 25 TABLET ORAL at 08:31

## 2023-01-15 RX ADMIN — SODIUM CHLORIDE: 9 INJECTION, SOLUTION INTRAVENOUS at 01:16

## 2023-01-15 RX ADMIN — ENOXAPARIN SODIUM 30 MG: 100 INJECTION SUBCUTANEOUS at 20:16

## 2023-01-15 RX ADMIN — SPIRONOLACTONE 50 MG: 50 TABLET ORAL at 08:31

## 2023-01-15 RX ADMIN — SODIUM CHLORIDE: 9 INJECTION, SOLUTION INTRAVENOUS at 11:23

## 2023-01-15 ASSESSMENT — PAIN SCALES - GENERAL
PAINLEVEL_OUTOF10: 0

## 2023-01-15 ASSESSMENT — ENCOUNTER SYMPTOMS
VOMITING: 0
NAUSEA: 0
DIARRHEA: 0
COUGH: 0
SHORTNESS OF BREATH: 0

## 2023-01-15 NOTE — PROGRESS NOTES
Progress Note  Date:1/15/2023       OIEP:[id number]  Patient Efraín Torres     Date of Birth:     Age:77 y.o. Subjective    Subjective:  Symptoms:  He reports malaise and weakness. No shortness of breath, cough, chest pain, headache, chest pressure, anorexia, diarrhea or anxiety. Diet:  No nausea or vomiting. Review of Systems   Respiratory:  Negative for cough and shortness of breath. Cardiovascular:  Negative for chest pain. Gastrointestinal:  Negative for anorexia, diarrhea, nausea and vomiting. Neurological:  Positive for weakness. Objective         Vitals Last 24 Hours:  TEMPERATURE:  Temp  Av.4 °F (36.3 °C)  Min: 97.3 °F (36.3 °C)  Max: 97.6 °F (36.4 °C)  RESPIRATIONS RANGE: Resp  Av  Min: 18  Max: 18  PULSE OXIMETRY RANGE: SpO2  Av %  Min: 95 %  Max: 95 %  PULSE RANGE: Pulse  Av.7  Min: 18  Max: 81  BLOOD PRESSURE RANGE: Systolic (18LNS), BXR:473 , Min:113 , KQU:276   ; Diastolic (13HDC), KVC:97, Min:62, Max:69    I/O (24Hr): Intake/Output Summary (Last 24 hours) at 1/15/2023 1329  Last data filed at 1/15/2023 0604  Gross per 24 hour   Intake 3240 ml   Output 900 ml   Net 2340 ml       Objective:  General Appearance:  Comfortable, well-appearing and in no acute distress. Vital signs: (most recent): Blood pressure 115/65, pulse 80, temperature 97.4 °F (36.3 °C), temperature source Oral, resp. rate 18, height 5' 8\" (1.727 m), weight 268 lb (121.6 kg), SpO2 95 %. HEENT: Normal HEENT exam.    Lungs:  Normal effort. Heart: S1 normal and S2 normal.    Abdomen: Abdomen is soft. Bowel sounds are normal.     Extremities: Normal range of motion. There is dependent edema. Neurological: Patient is alert. Pupils:  Pupils are equal, round, and reactive to light. Skin:  Warm and dry.     Labs/Imaging/Diagnostics    Labs:  CBC:  Recent Labs     23  0459 23  0454 01/15/23  0626   WBC 8.2 7.9 7.9   RBC 4.99 4.88 5.01   HGB 14.9 14.6 15.1   HCT 45.6 43.7 45.4   MCV 91.5 89.6 90.5   RDW 19.0* 18.6* 19.2*    167 176       CHEMISTRIES:  Recent Labs     01/13/23  0459 01/14/23  0821 01/15/23  0626    138 139   K 5.8* 5.3* 4.6  4.6   CL 99 102 102   CO2 21 22 20   BUN 53* 59* 59*   CREATININE 2.44* 2.36* 2.30*   GLUCOSE 110* 109* 111*       PT/INR:  No results for input(s): PROTIME, INR in the last 72 hours. APTT:  No results for input(s): APTT in the last 72 hours. LIVER PROFILE:  Recent Labs     01/13/23  0459 01/14/23  0821 01/15/23  0626   AST 43* 47* 50*   ALT 21 26 29   BILITOT 2.0* 1.6* 1.5*   ALKPHOS 187* 181* 197*         Imaging Last 24 Hours:  CT Head W/O Contrast    Result Date: 1/9/2023  EXAMINATION: CT OF THE HEAD WITHOUT CONTRAST  1/8/2023 7:50 pm TECHNIQUE: CT of the head was performed without the administration of intravenous contrast. Automated exposure control, iterative reconstruction, and/or weight based adjustment of the mA/kV was utilized to reduce the radiation dose to as low as reasonably achievable. COMPARISON: None. HISTORY: ORDERING SYSTEM PROVIDED HISTORY: [facility] TECHNOLOGIST PROVIDED HISTORY: Reason for exam:->[facility] Has a \"code stroke\" or \"stroke alert\" been called? ->No Decision Support Exception - unselect if not a suspected or confirmed emergency medical condition->Emergency Medical Condition (MA) What reading provider will be dictating this exam?->CRC FINDINGS: BRAIN/VENTRICLES: There is no acute intracranial hemorrhage, mass effect or midline shift. No abnormal extra-axial fluid collection. The gray-white differentiation is maintained without evidence of an acute infarct. There is no evidence of hydrocephalus. ORBITS: The visualized portion of the orbits demonstrate no acute abnormality. SINUSES: The visualized paranasal sinuses and mastoid air cells demonstrate no acute abnormality. SOFT TISSUES/SKULL:  No acute abnormality of the visualized skull or soft tissues.      No acute intracranial abnormality. XR CHEST PORTABLE    Result Date: 1/8/2023  EXAMINATION: ONE XRAY VIEW OF THE CHEST 1/8/2023 7:44 pm COMPARISON: None. HISTORY: ORDERING SYSTEM PROVIDED HISTORY: [facility] TECHNOLOGIST PROVIDED HISTORY: Reason for exam:->[facility] What reading provider will be dictating this exam?->CRC FINDINGS: There is low lung volumes. There is minimal atelectasis in the lung bases with possible developing infiltrates and pleural effusion in the left base. Low lung volumes with atelectasis/possible developing left basilar pneumonia. US DUP LOWER EXTREMITIES BILATERAL VENOUS    Result Date: 1/9/2023  EXAMINATION: DUPLEX VENOUS ULTRASOUND OF THE BILATERAL LOWER EXTREMITIES1/9/2023 9:45 am TECHNIQUE: Duplex ultrasound using B-mode/gray scaled imaging, Doppler spectral analysis and color flow Doppler was obtained of the deep venous structures of the lower bilateral extremities. COMPARISON: None. HISTORY: ORDERING SYSTEM PROVIDED HISTORY: edema r/o DVT TECHNOLOGIST PROVIDED HISTORY: Reason for exam:->edema r/o DVT What reading provider will be dictating this exam?->CRC FINDINGS: The visualized veins of the bilateral lower extremities are patent and free of echogenic thrombus. The veins demonstrate good compressibility with normal color flow study and spectral analysis. No evidence of DVT in either lower extremity. Assessment//Plan           Hospital Problems             Last Modified POA    * (Principal) Suicidal ideation 1/8/2023 Yes    General weakness 1/9/2023 Yes   Abd distension  Elevated LFTS  Lower ext swelling  SALOME on CKD  Suicidal ideal ation  [address]    Assessment & Plan  1/9: GI evaluation, abdominal ultrasound for liver and elevated bili LFT. Spoke with nursing about the care. Follow-up from psychiatry. Le doppler to r/o DVT   Echocardiogram to for EF. PT/ot  1/15: Patient going for liver biopsy on Tuesday. On IV fluid for acute kidney injury, sitter has been discontinued by psychiatry.   Patient is recommended to go to SNF as per psych. Status post abdominal paracentesis.   Electronically signed by Campos Caballero MD on 1/9/23 at 11:03 AM EST

## 2023-01-16 LAB
ANION GAP SERPL CALCULATED.3IONS-SCNC: 16 MEQ/L (ref 9–15)
BUN BLDV-MCNC: 59 MG/DL (ref 8–23)
CALCIUM SERPL-MCNC: 8 MG/DL (ref 8.5–9.9)
CHLORIDE BLD-SCNC: 105 MEQ/L (ref 95–107)
CO2: 20 MEQ/L (ref 20–31)
CREAT SERPL-MCNC: 2.02 MG/DL (ref 0.7–1.2)
GFR SERPL CREATININE-BSD FRML MDRD: 32.9 ML/MIN/{1.73_M2}
GLUCOSE BLD-MCNC: 126 MG/DL (ref 70–99)
GLUCOSE BLD-MCNC: 67 MG/DL (ref 70–99)
GLUCOSE BLD-MCNC: 79 MG/DL (ref 70–99)
GLUCOSE BLD-MCNC: 88 MG/DL (ref 70–99)
GLUCOSE BLD-MCNC: 95 MG/DL (ref 70–99)
PERFORMED ON: ABNORMAL
PERFORMED ON: ABNORMAL
PERFORMED ON: NORMAL
PERFORMED ON: NORMAL
POTASSIUM SERPL-SCNC: 4.5 MEQ/L (ref 3.4–4.9)
REASON FOR REJECTION: NORMAL
REJECTED TEST: NORMAL
SODIUM BLD-SCNC: 141 MEQ/L (ref 135–144)

## 2023-01-16 PROCEDURE — 6360000002 HC RX W HCPCS: Performed by: INTERNAL MEDICINE

## 2023-01-16 PROCEDURE — 6370000000 HC RX 637 (ALT 250 FOR IP): Performed by: PSYCHIATRY & NEUROLOGY

## 2023-01-16 PROCEDURE — 0W9G3ZX DRAINAGE OF PERITONEAL CAVITY, PERCUTANEOUS APPROACH, DIAGNOSTIC: ICD-10-PCS | Performed by: RADIOLOGY

## 2023-01-16 PROCEDURE — 6370000000 HC RX 637 (ALT 250 FOR IP): Performed by: INTERNAL MEDICINE

## 2023-01-16 PROCEDURE — 2060000000 HC ICU INTERMEDIATE R&B

## 2023-01-16 PROCEDURE — 6360000002 HC RX W HCPCS

## 2023-01-16 PROCEDURE — 80048 BASIC METABOLIC PNL TOTAL CA: CPT

## 2023-01-16 PROCEDURE — 6370000000 HC RX 637 (ALT 250 FOR IP): Performed by: SPECIALIST

## 2023-01-16 PROCEDURE — 36415 COLL VENOUS BLD VENIPUNCTURE: CPT

## 2023-01-16 PROCEDURE — 2580000003 HC RX 258: Performed by: INTERNAL MEDICINE

## 2023-01-16 RX ORDER — INSULIN GLARGINE 100 [IU]/ML
40 INJECTION, SOLUTION SUBCUTANEOUS NIGHTLY
Status: DISCONTINUED | OUTPATIENT
Start: 2023-01-16 | End: 2023-01-20 | Stop reason: HOSPADM

## 2023-01-16 RX ORDER — FENTANYL CITRATE 50 UG/ML
50 INJECTION, SOLUTION INTRAMUSCULAR; INTRAVENOUS
OUTPATIENT
Start: 2023-01-17

## 2023-01-16 RX ORDER — LIDOCAINE HYDROCHLORIDE 20 MG/ML
10 INJECTION, SOLUTION INFILTRATION; PERINEURAL
OUTPATIENT
Start: 2023-01-17

## 2023-01-16 RX ORDER — 0.9 % SODIUM CHLORIDE 0.9 %
250 INTRAVENOUS SOLUTION INTRAVENOUS
OUTPATIENT
Start: 2023-01-17

## 2023-01-16 RX ORDER — MIDAZOLAM HYDROCHLORIDE 2 MG/2ML
0.5 INJECTION, SOLUTION INTRAMUSCULAR; INTRAVENOUS
OUTPATIENT
Start: 2023-01-17

## 2023-01-16 RX ADMIN — TRAMADOL HYDROCHLORIDE 25 MG: 50 TABLET ORAL at 08:05

## 2023-01-16 RX ADMIN — SODIUM CHLORIDE: 9 INJECTION, SOLUTION INTRAVENOUS at 06:55

## 2023-01-16 RX ADMIN — ENOXAPARIN SODIUM 30 MG: 100 INJECTION SUBCUTANEOUS at 08:05

## 2023-01-16 RX ADMIN — SPIRONOLACTONE 50 MG: 50 TABLET ORAL at 08:05

## 2023-01-16 RX ADMIN — SERTRALINE HYDROCHLORIDE 25 MG: 25 TABLET ORAL at 08:05

## 2023-01-16 RX ADMIN — HYDROMORPHONE HYDROCHLORIDE 0.25 MG: 1 INJECTION, SOLUTION INTRAMUSCULAR; INTRAVENOUS; SUBCUTANEOUS at 17:11

## 2023-01-16 ASSESSMENT — ENCOUNTER SYMPTOMS
ABDOMINAL DISTENTION: 1
NAUSEA: 0
COLOR CHANGE: 0
SORE THROAT: 0
BACK PAIN: 0
DIARRHEA: 0
RESPIRATORY NEGATIVE: 1
SHORTNESS OF BREATH: 0
VOMITING: 0
ABDOMINAL PAIN: 1
EYES NEGATIVE: 1
TROUBLE SWALLOWING: 0
WHEEZING: 0
COUGH: 0

## 2023-01-16 ASSESSMENT — PAIN SCALES - GENERAL
PAINLEVEL_OUTOF10: 0
PAINLEVEL_OUTOF10: 7
PAINLEVEL_OUTOF10: 8
PAINLEVEL_OUTOF10: 0

## 2023-01-16 ASSESSMENT — PAIN DESCRIPTION - ORIENTATION: ORIENTATION: MID

## 2023-01-16 ASSESSMENT — PAIN DESCRIPTION - LOCATION
LOCATION: ABDOMEN
LOCATION: BACK;ABDOMEN

## 2023-01-16 ASSESSMENT — PAIN DESCRIPTION - DESCRIPTORS
DESCRIPTORS: ACHING
DESCRIPTORS: ACHING

## 2023-01-16 NOTE — FLOWSHEET NOTE
Spoke with Patricia Karimi RN regarding pt radiology procedures on 1/17 at 1430 and 1500. Nursing communication order placed for light breakfast on 1/17.

## 2023-01-16 NOTE — PROGRESS NOTES
Physical Therapy Missed Treatment   Facility/Department: [facility] MED SURG [id number]/[id number]    NAME: Zandra Goltz    : 1945 (87 y.o.)  MRN: [id number]    Account: [de-identified]  Gender: male    Chart reviewed, attempted PT at 09:11. Patient unavailable 2° to:        [x] Pt declined after encouragement and multiple attempts this AM. Pt stated he is in too much pain after being cleaned up and bedding changed. Will try again in the PM.     [x] Nsg notified             Will attempt PT treatment again at earliest convenience.       Electronically signed by Leo Martinez PTA on 23 at 9:26 AM EST

## 2023-01-16 NOTE — CARE COORDINATION
MET WITH PATIENT, FOR LIVER BIOPSY TOMORROW. AWARE HE WILL NEED SNF UPON DC.  STATES HIS SISTER WILL BE COMING IN TODAY FROM OUT OF TOWN. SNF LIST LEFT AT BEDSIDE. SECOND IMM GIVEN.     1500  CALLED PTS SISTER, KATHY, SHE IS FLYING HERE FROM [place] [employer] AROUND 10. SISTER AWARE NEEDED SNF AND WILL DISCUSS WITH PT IN AM.  CM/LSW TO FOLLOW UP TOMORROW FOR CHOICE.

## 2023-01-16 NOTE — PROGRESS NOTES
[facility] Occupational Therapy      Date: 2023  Patient Name: Omkar Hernandez        MRN: [id number]  Account: [de-identified]   : 1945  (76 y.o.)  Room: [place] [address]    Chart reviewed, attempted OT at 15:40 for OT treatment. Patient not seen 2° to:    Pt. declined, stating: Tired    Will attempt again when able.     Electronically signed by Raguel Lombard, OTA on 2023 at 3:52 PM

## 2023-01-16 NOTE — PROGRESS NOTES
Hospitalist Daily Progress Note  Name: Makenna Landry  Age: 76 y.o. Gender: male  CodeStatus: Full Code  Allergies: No Known Allergies    Chief Complaint:Fatigue (Difficulty ambulating.)    Primary Care Provider: [address] [place] [address]  InpatientTreatment Team: Treatment Team: Attending Provider: Misbah Raymond DO; Consulting Physician: Maryana Plunkett MD; Consulting Physician: Ventura Vieira MD; Consulting Physician: Mable Payne MD; : Glory Narvaez, RN; Respiratory Therapist (Day): Linnette Edge RCP; Utilization Reviewer: Shelly Alicea RN; Registered Nurse: Ramila Oliver. Ashanti Bahler; Patient Care Tech: Radha [facility]  Admission Date: 1/8/2023      Subjective: Patient is seen evaluated bedside. He continues to have lower abdominal extremity pain. He is more lethargic today. Awaiting biopsy for diagnosis of hepatocellular carcinoma, CT-guided biopsy is set for possibly tomorrow. Patient is afebrile vitals are stable. BUN/creatinine remain elevated 59 and 2.02 respectively had an episode of hypoglycemia this morning which seems to be resolved    Physical Exam  Constitutional:       Appearance: He is obese. He is ill-appearing and toxic-appearing. HENT:      Head: Normocephalic and atraumatic. Mouth/Throat:      Mouth: Mucous membranes are dry. Pharynx: Oropharynx is clear. Eyes:      Conjunctiva/sclera: Conjunctivae normal.      Pupils: Pupils are equal, round, and reactive to light. Cardiovascular:      Rate and Rhythm: Normal rate. Heart sounds: No murmur heard. No gallop. Pulmonary:      Breath sounds: No wheezing, rhonchi or rales. Abdominal:      General: There is distension. Tenderness: There is abdominal tenderness. There is no guarding. Musculoskeletal:         General: Swelling present. Right lower leg: Edema present. Left lower leg: Edema present.    Neurological:      General: No focal deficit present. Mental Status: He is alert and oriented to person, place, and time. Psychiatric:         Mood and Affect: Mood normal.         Thought Content: Thought content normal.       Review of Systems  14 point ros is reviewed and negative except for as above. Medications:  Reviewed    Infusion Medications:    sodium chloride 100 mL/hr at 01/16/23 0655    dextrose       Scheduled Medications:    scopolamine  1 patch TransDERmal Q72H    [Held by provider] furosemide  20 mg Oral BID    sertraline  25 mg Oral Daily    enoxaparin  30 mg SubCUTAneous BID    insulin glargine  50 Units SubCUTAneous Nightly    insulin lispro  0-8 Units SubCUTAneous TID WC    insulin lispro  0-4 Units SubCUTAneous Nightly    spironolactone  50 mg Oral Daily     PRN Meds: traMADol, ondansetron **OR** ondansetron, polyethylene glycol, glucose, dextrose bolus **OR** dextrose bolus, glucagon (rDNA), dextrose    Labs:   Recent Labs     01/14/23  0454 01/15/23  0626   WBC 7.9 7.9   HGB 14.6 15.1   HCT 43.7 45.4    176     Recent Labs     01/14/23  0821 01/15/23  0626 01/16/23  0614    139 141   K 5.3* 4.6  4.6 4.5    102 105   CO2 22 20 20   BUN 59* 59* 59*   CREATININE 2.36* 2.30* 2.02*   CALCIUM 8.1* 8.6 8.0*     Recent Labs     01/14/23  0821 01/15/23  0626   AST 47* 50*   ALT 26 29   BILITOT 1.6* 1.5*   ALKPHOS 181* 197*     No results for input(s): INR in the last 72 hours. No results for input(s): Michael Clap in the last 72 hours. Urinalysis:   Lab Results   Component Value Date/Time    NITRU Negative 01/08/2023 06:45 PM    WBCUA 3-5 01/08/2023 06:45 PM    BACTERIA Negative 01/08/2023 06:45 PM    RBCUA 10-20 01/08/2023 06:45 PM    BLOODU SMALL 01/08/2023 06:45 PM    SPECGRAV 1.021 01/08/2023 06:45 PM    GLUCOSEU Negative 01/08/2023 06:45 PM       Radiology:   Most recent    Chest CT      WITH CONTRAST:No results found for this or any previous visit.        WITHOUT CONTRAST: No results found for this or any previous visit. CXR      2-view: No results found for this or any previous visit. Portable: Results for orders placed during the hospital encounter of 01/08/23    XR CHEST PORTABLE    Narrative  EXAMINATION:  ONE XRAY VIEW OF THE CHEST    1/8/2023 7:44 pm    COMPARISON:  None. HISTORY:  ORDERING SYSTEM PROVIDED HISTORY: AMS  TECHNOLOGIST PROVIDED HISTORY:  Reason for exam:->AMS  What reading provider will be dictating this exam?->CRC    FINDINGS:  There is low lung volumes. There is minimal atelectasis in the lung bases  with possible developing infiltrates and pleural effusion in the left base. Impression  Low lung volumes with atelectasis/possible developing left basilar pneumonia. Echo No results found for this or any previous visit. Assessment/Plan:    Active Hospital Problems    Diagnosis Date Noted    General weakness [R53.1] 01/09/2023     Priority: Medium    Suicidal ideation [R45.851] 01/08/2023     Priority: Medium     Liver mass with elevated AFP: concern for [facility] [address] Biopsy tomorrow. Prognosis can be better determined following path results. Heme/Onc following. Consult to palliative care for symptom management. Overall poor prognosis. SALOME with concern for hepatorenal: nephrology consult, baseline Cr was around 1.4. urine siodium/creatinine, pending. Check UA. Avoid nephrotoxic agents, clinically dry. On NS 100ml/hr currently. Lasix held on aldactone 50mg daily. DMII: lantus 50 U nightly Humalog sliding scale, reduce lantus to 40 given hypoglycemia. Depression: zoloft. Severe protein calorie malnutrition: consult dietary. Additional work up or/and treatment plan may be added today or then after based on clinical progression. I am managing a portion of pt care. Some medical issues are handled byother specialists. Additional work up and treatment should be done in out pt setting by pt PCP and other out pt providers.      In addition to examining and evaluating pt, I spent additional time explaining care, normaland abnormal findings, and treatment plan. All of pt questions were answered. Counseling, diet and education were provided. Case will be discussed with nursing staff when appropriate. Family will be updated if and whenappropriate.       Electronically signed by Elma Chappell DO on 1/16/2023 at 11:24 AM

## 2023-01-16 NOTE — PLAN OF CARE
Nutrition Problem #1: Inadequate oral intake  Intervention: Food and/or Nutrient Delivery: Continue Current Diet, Start Oral Nutrition Supplement  Nutritional  Goals: PO intake >50%.

## 2023-01-16 NOTE — PROGRESS NOTES
Physical Therapy Missed Treatment   Facility/Department: [facility] MED SURG [id number]    NAME: Cody Jimenez    : 1945 (89 y.o.)  MRN: [id number]    Account: [de-identified]  Gender: male    Chart reviewed, attempted PT at 13:08. Patient unavailable 2° to:        [x] Pt declined for the second time today. Attempted to see pt in the AM and PM, pt adamantly declined both time. Pt educated on the importance of OOB activities, but still continues to refuse. [x] Nsg notified   [] Other notified          Will attempt PT treatment again at earliest convenience.       Electronically signed by Zandra Yates, PTA on 23 at 1:13 PM EST

## 2023-01-16 NOTE — PROGRESS NOTES
Comprehensive Nutrition Assessment    Type and Reason for Visit:  Initial, Consult    Nutrition Recommendations/Plan:   Obtain bed scale wt once bed is zeroed  Trial diabetic supplement TID   Continue carb control 5 diet, encouraging PO intake      Malnutrition Assessment:  Malnutrition Status: At risk for malnutrition (Comment) (01/16/23 3999)    Context:  Social/Environmental Circumstances     Findings of the 6 clinical characteristics of malnutrition:  Energy Intake:  50% or less estimated energy requirements for 1 month or longer  Weight Loss:  Unable to assess     Body Fat Loss:  No significant body fat loss     Muscle Mass Loss:  No significant muscle mass loss    Fluid Accumulation:  Unable to assess     Strength:  Not Performed    Nutrition Assessment:    Pt at risk for malnutrition with suboptimal PO intake x 5 wees related to depression. UTD if wt loss is present with limited bed scale weights available. RD to trial high calorie supplement TID to help optimize nutrition status. Noted pending liver biopsy for possible new cancer dx requiring increased nutrient needs. Nutrition Related Findings:    Pt presents with SI. CT of the abdomen showed liver lesion probably malignant. Biopsy pending for tomorrow. Pmhx: DM. Pt reports no PO intake x 5 weeks as a suicide attempt. Last BM 1/15. Labs (1/16): BUN/Cr=59/2.02; Gluc=. Elevated LFTs. NS @100ml/hr. Meds reviewed. Continues with poor PO/appetite. +2BLE edema noted. Wound Type: None       Current Nutrition Intake & Therapies:    Average Meal Intake: 1-25%  Average Supplements Intake: None Ordered  ADULT DIET; Regular; 5 carb choices (75 gm/meal); Low Sodium (2 gm);  Safety Tray; Safety Tray (Disposables)  Diet NPO Exceptions are: Sips of Water with Meds  ADULT ORAL NUTRITION SUPPLEMENT; Breakfast, Lunch, Dinner; Diabetic Oral Supplement    Anthropometric Measures:  Height: 5' 8\" (172.7 cm)  Ideal Body Weight (IBW): 154 lbs (70 kg)    Admission Body Weight: 268 lb (121.6 kg) (1/8 stated)  Current Body Weight: 268 lb (121.6 kg) (1/8),   Weight Source: Stated  Current BMI (kg/m2): 40.8  Usual Body Weight: 231 lb 3 oz (104.9 kg) (bed 2020)  % Weight Change (Calculated): 15.9                    BMI Categories: Obese Class 3 (BMI 40.0 or greater)    Estimated Daily Nutrient Needs:  Energy Requirements Based On: Kcal/kg  Weight Used for Energy Requirements: Current  Energy (kcal/day): 1824-2000kcal (15-17kcal/kg)  Weight Used for Protein Requirements: Ideal  Protein (g/day): 119-140g (1.7-2g/kg)  Method Used for Fluid Requirements: 1 ml/kcal  Fluid (ml/day): ~2000ml    Nutrition Diagnosis:   Inadequate oral intake related to psychological cause or life stress as evidenced by poor intake prior to admission, intake 0-25%    Nutrition Interventions:   Food and/or Nutrient Delivery: Continue Current Diet, Start Oral Nutrition Supplement  Nutrition Education/Counseling: Education declined  Coordination of Nutrition Care: Continue to monitor while inpatient       Goals:     Goals: PO intake 75% or greater, other (specify)  Specify Other Goals: Improved appetite    Nutrition Monitoring and Evaluation:   Behavioral-Environmental Outcomes: None Identified  Food/Nutrient Intake Outcomes: Food and Nutrient Intake, Supplement Intake  Physical Signs/Symptoms Outcomes: Biochemical Data, Weight, Meal Time Behavior    Discharge Planning:     Too soon to determine     Chelita Meredith, MS, RD, LD

## 2023-01-17 ENCOUNTER — HOSPITAL ENCOUNTER (INPATIENT)
Dept: INTERVENTIONAL RADIOLOGY/VASCULAR | Age: 78
Discharge: HOME | End: 2023-01-19
Payer: MEDICARE

## 2023-01-17 VITALS
SYSTOLIC BLOOD PRESSURE: 93 MMHG | RESPIRATION RATE: 12 BRPM | DIASTOLIC BLOOD PRESSURE: 58 MMHG | HEART RATE: 78 BPM | OXYGEN SATURATION: 92 %

## 2023-01-17 PROBLEM — Z71.89 GOALS OF CARE, COUNSELING/DISCUSSION: Status: ACTIVE | Noted: 2023-01-17

## 2023-01-17 PROBLEM — Z51.5 PALLIATIVE CARE ENCOUNTER: Status: ACTIVE | Noted: 2023-01-01

## 2023-01-17 PROBLEM — Z91.89 AT HIGH RISK FOR MALNUTRITION: Status: ACTIVE | Noted: 2023-01-01

## 2023-01-17 PROBLEM — Z71.89 ADVANCED CARE PLANNING/COUNSELING DISCUSSION: Status: ACTIVE | Noted: 2023-01-01

## 2023-01-17 PROBLEM — Z71.89 ENCOUNTER FOR HOSPICE CARE DISCUSSION: Status: ACTIVE | Noted: 2023-01-17

## 2023-01-17 LAB
ALBUMIN SERPL-MCNC: 1.8 G/DL (ref 3.5–4.6)
ALP BLD-CCNC: 168 U/L (ref 35–104)
ALT SERPL-CCNC: 25 U/L (ref 0–41)
AMMONIA: 81 UMOL/L (ref 16–60)
ANION GAP SERPL CALCULATED.3IONS-SCNC: 18 MEQ/L (ref 9–15)
AST SERPL-CCNC: 36 U/L (ref 0–40)
BACTERIA: ABNORMAL /HPF
BASE EXCESS ARTERIAL: -7 (ref -3–3)
BASOPHILS ABSOLUTE: 0 K/UL (ref 0–0.2)
BASOPHILS RELATIVE PERCENT: 0.1 %
BILIRUB SERPL-MCNC: 1.2 MG/DL (ref 0.2–0.7)
BILIRUBIN DIRECT: 0.8 MG/DL (ref 0–0.4)
BILIRUBIN URINE: NEGATIVE
BILIRUBIN, INDIRECT: 0.4 MG/DL (ref 0–0.6)
BLOOD, URINE: NEGATIVE
BODY FLUID CULTURE, STERILE: NORMAL
BUN BLDV-MCNC: 63 MG/DL (ref 8–23)
CALCIUM IONIZED: 1.15 MMOL/L (ref 1.12–1.32)
CALCIUM SERPL-MCNC: 8.1 MG/DL (ref 8.5–9.9)
CHLORIDE BLD-SCNC: 107 MEQ/L (ref 95–107)
CLARITY: CLEAR
CO2: 16 MEQ/L (ref 20–31)
COLOR: ABNORMAL
CREAT SERPL-MCNC: 2.07 MG/DL (ref 0.7–1.2)
CREATININE URINE: 127.6 MG/DL
EOSINOPHILS ABSOLUTE: 0 K/UL (ref 0–0.7)
EOSINOPHILS RELATIVE PERCENT: 0.1 %
EPITHELIAL CELLS, UA: ABNORMAL /HPF (ref 0–5)
GFR SERPL CREATININE-BSD FRML MDRD: 28 ML/MIN/{1.73_M2}
GFR SERPL CREATININE-BSD FRML MDRD: 31.9 ML/MIN/{1.73_M2}
GLUCOSE BLD-MCNC: 110 MG/DL (ref 70–99)
GLUCOSE BLD-MCNC: 122 MG/DL (ref 70–99)
GLUCOSE BLD-MCNC: 122 MG/DL (ref 70–99)
GLUCOSE BLD-MCNC: 123 MG/DL (ref 70–99)
GLUCOSE BLD-MCNC: 133 MG/DL (ref 70–99)
GLUCOSE BLD-MCNC: 150 MG/DL (ref 70–99)
GLUCOSE URINE: NEGATIVE MG/DL
HCO3 ARTERIAL: 18.3 MMOL/L (ref 21–29)
HCT VFR BLD CALC: 43.6 % (ref 42–52)
HEMOGLOBIN: 14.1 G/DL (ref 14–18)
HEMOGLOBIN: 15.1 GM/DL (ref 13.5–17.5)
HYALINE CASTS: ABNORMAL /HPF (ref 0–5)
INR BLD: 2.3
KETONES, URINE: ABNORMAL MG/DL
LACTATE: 2.05 MMOL/L (ref 0.4–2)
LEUKOCYTE ESTERASE, URINE: ABNORMAL
LYMPHOCYTES ABSOLUTE: 1 K/UL (ref 1–4.8)
LYMPHOCYTES RELATIVE PERCENT: 11.5 %
MCH RBC QN AUTO: 29.6 PG (ref 27–31.3)
MCHC RBC AUTO-ENTMCNC: 32.3 % (ref 33–37)
MCV RBC AUTO: 91.5 FL (ref 79–92.2)
MONOCYTES ABSOLUTE: 0.6 K/UL (ref 0.2–0.8)
MONOCYTES RELATIVE PERCENT: 6.9 %
NEUTROPHILS ABSOLUTE: 6.9 K/UL (ref 1.4–6.5)
NEUTROPHILS RELATIVE PERCENT: 81.4 %
NITRITE, URINE: NEGATIVE
O2 SAT, ARTERIAL: 93 % (ref 93–100)
PCO2 ARTERIAL: 34 MM HG (ref 35–45)
PDW BLD-RTO: 20.1 % (ref 11.5–14.5)
PERFORMED ON: ABNORMAL
PH ARTERIAL: 7.34 (ref 7.35–7.45)
PH UA: 5 (ref 5–9)
PLATELET # BLD: 147 K/UL (ref 130–400)
PO2 ARTERIAL: 70 MM HG (ref 75–108)
POC CHLORIDE: 115 MEQ/L (ref 99–110)
POC CREATININE: 2.3 MG/DL (ref 0.8–1.3)
POC HEMATOCRIT: 44 % (ref 41–53)
POC POTASSIUM: 4.3 MEQ/L (ref 3.5–5.1)
POC SAMPLE TYPE: ABNORMAL
POC SODIUM: 143 MEQ/L (ref 136–145)
POTASSIUM SERPL-SCNC: 4.3 MEQ/L (ref 3.4–4.9)
PROTEIN UA: NEGATIVE MG/DL
PROTHROMBIN TIME: 25.8 SEC (ref 12.3–14.9)
RBC # BLD: 4.76 M/UL (ref 4.7–6.1)
RBC UA: ABNORMAL /HPF (ref 0–5)
SODIUM BLD-SCNC: 141 MEQ/L (ref 135–144)
SODIUM URINE: <20 MEQ/L
SPECIFIC GRAVITY UA: 1.02 (ref 1–1.03)
TCO2 ARTERIAL: 19 MMOL/L (ref 21–32)
TOTAL PROTEIN: 5.4 G/DL (ref 6.3–8)
UROBILINOGEN, URINE: 1 E.U./DL
WBC # BLD: 8.5 K/UL (ref 4.8–10.8)
WBC UA: ABNORMAL /HPF (ref 0–5)

## 2023-01-17 PROCEDURE — 82330 ASSAY OF CALCIUM: CPT

## 2023-01-17 PROCEDURE — 84300 ASSAY OF URINE SODIUM: CPT

## 2023-01-17 PROCEDURE — 81001 URINALYSIS AUTO W/SCOPE: CPT

## 2023-01-17 PROCEDURE — 36415 COLL VENOUS BLD VENIPUNCTURE: CPT

## 2023-01-17 PROCEDURE — 36600 WITHDRAWAL OF ARTERIAL BLOOD: CPT

## 2023-01-17 PROCEDURE — 6360000002 HC RX W HCPCS: Performed by: INTERNAL MEDICINE

## 2023-01-17 PROCEDURE — 84295 ASSAY OF SERUM SODIUM: CPT

## 2023-01-17 PROCEDURE — 83605 ASSAY OF LACTIC ACID: CPT

## 2023-01-17 PROCEDURE — 99223 1ST HOSP IP/OBS HIGH 75: CPT

## 2023-01-17 PROCEDURE — 82803 BLOOD GASES ANY COMBINATION: CPT

## 2023-01-17 PROCEDURE — 85014 HEMATOCRIT: CPT

## 2023-01-17 PROCEDURE — 80076 HEPATIC FUNCTION PANEL: CPT

## 2023-01-17 PROCEDURE — 80048 BASIC METABOLIC PNL TOTAL CA: CPT

## 2023-01-17 PROCEDURE — 2580000003 HC RX 258: Performed by: INTERNAL MEDICINE

## 2023-01-17 PROCEDURE — 2709999900 IR US GUIDED PARACENTESIS

## 2023-01-17 PROCEDURE — 85025 COMPLETE CBC W/AUTO DIFF WBC: CPT

## 2023-01-17 PROCEDURE — 6370000000 HC RX 637 (ALT 250 FOR IP): Performed by: INTERNAL MEDICINE

## 2023-01-17 PROCEDURE — 6360000002 HC RX W HCPCS

## 2023-01-17 PROCEDURE — 2500000003 HC RX 250 WO HCPCS: Performed by: RADIOLOGY

## 2023-01-17 PROCEDURE — 85610 PROTHROMBIN TIME: CPT

## 2023-01-17 PROCEDURE — 49083 ABD PARACENTESIS W/IMAGING: CPT

## 2023-01-17 PROCEDURE — 99233 SBSQ HOSP IP/OBS HIGH 50: CPT | Performed by: RADIOLOGY

## 2023-01-17 PROCEDURE — 49083 ABD PARACENTESIS W/IMAGING: CPT | Performed by: RADIOLOGY

## 2023-01-17 PROCEDURE — 84132 ASSAY OF SERUM POTASSIUM: CPT

## 2023-01-17 PROCEDURE — 82948 REAGENT STRIP/BLOOD GLUCOSE: CPT

## 2023-01-17 PROCEDURE — 82140 ASSAY OF AMMONIA: CPT

## 2023-01-17 PROCEDURE — 82435 ASSAY OF BLOOD CHLORIDE: CPT

## 2023-01-17 PROCEDURE — 82570 ASSAY OF URINE CREATININE: CPT

## 2023-01-17 PROCEDURE — 2060000000 HC ICU INTERMEDIATE R&B

## 2023-01-17 PROCEDURE — 82565 ASSAY OF CREATININE: CPT

## 2023-01-17 RX ORDER — LACTULOSE 10 G/15ML
20 SOLUTION ORAL 3 TIMES DAILY
Status: DISCONTINUED | OUTPATIENT
Start: 2023-01-17 | End: 2023-01-18

## 2023-01-17 RX ORDER — PHYTONADIONE 5 MG/1
10 TABLET ORAL ONCE
Status: COMPLETED | OUTPATIENT
Start: 2023-01-17 | End: 2023-01-17

## 2023-01-17 RX ORDER — LIDOCAINE HYDROCHLORIDE 20 MG/ML
INJECTION, SOLUTION INFILTRATION; PERINEURAL
Status: COMPLETED | OUTPATIENT
Start: 2023-01-17 | End: 2023-01-17

## 2023-01-17 RX ADMIN — PHYTONADIONE 10 MG: 5 TABLET ORAL at 18:43

## 2023-01-17 RX ADMIN — LACTULOSE 20 G: 20 SOLUTION ORAL at 22:01

## 2023-01-17 RX ADMIN — HYDROMORPHONE HYDROCHLORIDE 0.25 MG: 1 INJECTION, SOLUTION INTRAMUSCULAR; INTRAVENOUS; SUBCUTANEOUS at 08:16

## 2023-01-17 RX ADMIN — LIDOCAINE HYDROCHLORIDE 8 ML: 20 INJECTION, SOLUTION INFILTRATION; PERINEURAL at 14:53

## 2023-01-17 RX ADMIN — SODIUM CHLORIDE 1000 ML: 9 INJECTION, SOLUTION INTRAVENOUS at 01:37

## 2023-01-17 RX ADMIN — SODIUM CHLORIDE: 9 INJECTION, SOLUTION INTRAVENOUS at 11:56

## 2023-01-17 RX ADMIN — CEFTRIAXONE SODIUM 1000 MG: 1 INJECTION, POWDER, FOR SOLUTION INTRAMUSCULAR; INTRAVENOUS at 17:30

## 2023-01-17 RX ADMIN — LACTULOSE 20 G: 20 SOLUTION ORAL at 17:42

## 2023-01-17 RX ADMIN — ENOXAPARIN SODIUM 30 MG: 100 INJECTION SUBCUTANEOUS at 22:00

## 2023-01-17 ASSESSMENT — PAIN DESCRIPTION - ORIENTATION: ORIENTATION: RIGHT;LEFT

## 2023-01-17 ASSESSMENT — PAIN SCALES - GENERAL
PAINLEVEL_OUTOF10: 0
PAINLEVEL_OUTOF10: 0

## 2023-01-17 NOTE — CARE COORDINATION
MET WITH PATIENTS SISTER AT BEDSIDE. LIVER BIOPSY CANCELLED D/T INR. PT HAS BEEN LETHARGIC, PT HAS NOT WORKED WITH THERAPY SINCE 1/10. DISCUSSED GOALS AND DIFFERENT OPTIONS UPON DC.  SISTER STATES SHE WOULD \"LIKE PT TO TRY TO GET BETTER\". SISTER IS AWARE PT HAS BEEN STATING \"IM DONE\". SISTER IS AWARE PT NEEDS TO WORK WITH PT/OT TO BE ABLE TO START PRECERT AND WOULD LIKE TO SEE IF HIS MENTATION IS BETTER TOMORROW. SNF LIST WAS GIVEN. AS OF NOW PLAN WILL BE SNF WITH PALL CARE AND TRANSITION TO HOSPICE WHEN NEEDED. DR VELÁZQUEZ UPDATED.

## 2023-01-17 NOTE — PROGRESS NOTES
Hospitalist Daily Progress Note  Name: Babar Connors  Age: 76 y.o. Gender: male  CodeStatus: Full Code  Allergies: No Known Allergies    Chief Complaint:Fatigue (Difficulty ambulating.)    Primary Care Provider: [address] [place], [place] [address]  InpatientTreatment Team: Treatment Team: Attending Provider: Elma Chappell DO; Consulting Physician: Kaylee Toure MD; Consulting Physician: Rajan Wills MD; Patient Care Tech: Claudia Remy; : Shanel Kern RN; Registered Nurse: Socorro Huang. Red Linda RN; Patient Care Tech: Sherren Rily; Utilization Reviewer: Laurence Jiménez RN  Admission Date: 1/8/2023      Subjective: Patient is seen evaluated bedside. Lethargic and sleepy today. Plan for CT guided biopsy today. Sister at the bedside and updated. Cr stable 2.07    Physical Exam  Constitutional:       Appearance: He is obese. He is ill-appearing and toxic-appearing. Comments: lethargic   HENT:      Head: Normocephalic and atraumatic. Mouth/Throat:      Mouth: Mucous membranes are dry. Pharynx: Oropharynx is clear. Eyes:      Conjunctiva/sclera: Conjunctivae normal.      Pupils: Pupils are equal, round, and reactive to light. Cardiovascular:      Rate and Rhythm: Normal rate. Heart sounds: No murmur heard. No gallop. Pulmonary:      Breath sounds: No wheezing, rhonchi or rales. Abdominal:      General: There is distension. Tenderness: There is abdominal tenderness. There is no guarding. Musculoskeletal:         General: Swelling present. Right lower leg: Edema present. Left lower leg: Edema present. Neurological:      Comments: Lethargic, groans to stimulation   Psychiatric:         Mood and Affect: Mood normal.         Thought Content: Thought content normal.       Review of Systems  14 point ros is reviewed and negative except for as above.    Medications:  Reviewed    Infusion Medications:    sodium chloride 1,000 mL (01/17/23 0137)    dextrose       Scheduled Medications:    insulin glargine  40 Units SubCUTAneous Nightly    scopolamine  1 patch TransDERmal Q72H    [Held by provider] furosemide  20 mg Oral BID    sertraline  25 mg Oral Daily    enoxaparin  30 mg SubCUTAneous BID    insulin lispro  0-8 Units SubCUTAneous TID WC    insulin lispro  0-4 Units SubCUTAneous Nightly    spironolactone  50 mg Oral Daily     PRN Meds: HYDROmorphone, traMADol, ondansetron **OR** ondansetron, polyethylene glycol, glucose, dextrose bolus **OR** dextrose bolus, glucagon (rDNA), dextrose    Labs:   Recent Labs     01/15/23  0626 01/17/23  0505   WBC 7.9 8.5   HGB 15.1 14.1   HCT 45.4 43.6    147       Recent Labs     01/15/23  0626 01/16/23  0614 01/17/23  0505    141 141   K 4.6  4.6 4.5 4.3    105 107   CO2 20 20 16*   BUN 59* 59* 63*   CREATININE 2.30* 2.02* 2.07*   CALCIUM 8.6 8.0* 8.1*       Recent Labs     01/15/23  0626 01/17/23  0505   AST 50* 36   ALT 29 25   BILIDIR  --  0.8*   BILITOT 1.5* 1.2*   ALKPHOS 197* 168*       Recent Labs     01/17/23  0505   INR 2.3     No results for input(s): CKTOTAL, TROPONINI in the last 72 hours. Urinalysis:   Lab Results   Component Value Date/Time    NITRU Negative 01/08/2023 06:45 PM    WBCUA 3-5 01/08/2023 06:45 PM    BACTERIA Negative 01/08/2023 06:45 PM    RBCUA 10-20 01/08/2023 06:45 PM    BLOODU SMALL 01/08/2023 06:45 PM    SPECGRAV 1.021 01/08/2023 06:45 PM    GLUCOSEU Negative 01/08/2023 06:45 PM       Radiology:   Most recent    Chest CT      WITH CONTRAST:No results found for this or any previous visit. WITHOUT CONTRAST: No results found for this or any previous visit. CXR      2-view: No results found for this or any previous visit.        Portable: Results for orders placed during the hospital encounter of 01/08/23    XR CHEST PORTABLE    Narrative  EXAMINATION:  ONE XRAY VIEW OF THE CHEST    1/8/2023 7:44 pm    COMPARISON:  None. HISTORY:  ORDERING SYSTEM PROVIDED HISTORY: AMS  TECHNOLOGIST PROVIDED HISTORY:  Reason for exam:->AMS  What reading provider will be dictating this exam?->CRC    FINDINGS:  There is low lung volumes. There is minimal atelectasis in the lung bases  with possible developing infiltrates and pleural effusion in the left base. Impression  Low lung volumes with atelectasis/possible developing left basilar pneumonia. Echo No results found for this or any previous visit. Assessment/Plan:    Active Hospital Problems    Diagnosis Date Noted    General weakness [R53.1] 01/09/2023     Priority: Medium    Suicidal ideation [R45.851] 01/08/2023     Priority: Medium     Liver mass with elevated AFP: concern for [facility] [address] Biopsy today. Prognosis can be better determined following path results. Heme/Onc following. Consult to palliative care for symptom management. Overall poor prognosis. SALOME with concern for hepatorenal: nephrology consult, baseline Cr was around 1.4. urine siodium/creatinine, pending. Check UA. Avoid nephrotoxic agents, clinically dry. On NS 100ml/hr currently. Lasix held on aldactone 50mg daily. DMII: lantus 40 U nightly Humalog sliding scale    Lethargy. CT head negative on admission for mets. Abg, ammonia now. CT head, if negative may need further imaging for brain mets. Depression: zoloft. Severe protein calorie malnutrition: consult dietary. Additional work up or/and treatment plan may be added today or then after based on clinical progression. I am managing a portion of pt care. Some medical issues are handled byother specialists. Additional work up and treatment should be done in out pt setting by pt PCP and other out pt providers. In addition to examining and evaluating pt, I spent additional time explaining care, normaland abnormal findings, and treatment plan. All of pt questions were answered. Counseling, diet and education were provided. Case will be discussed with nursing staff when appropriate. Family will be updated if and whenappropriate.       Electronically signed by Harshad Son DO on 1/17/2023 at 9:13 AM

## 2023-01-17 NOTE — PROGRESS NOTES
Physical Therapy Missed Treatment   Facility/Department: [facility] MED SURG [id number]    NAME: Franklin Hoover    : 1945 (12 y.o.)  MRN: [id number]    Account: [de-identified]  Gender: male    Chart reviewed, attempted PT at 36. Patient unavailable 2° to:    [] Hold per nsg request    [x] Pt declined- pt sleeping upon arrival and wakes to knock. Family in room states pt recently medicated with dilaudid and has been asleep since. Pt very lethargic upon waking and shakes head multiple times to therapy despite max encouragement. [] Pt. . off floor for test/procedure. [] Pt. Unavailable       Will attempt PT treatment again at earliest convenience.       Electronically signed by Jessica Stanley PTA on 23 at 9:46 AM EST

## 2023-01-17 NOTE — PROGRESS NOTES
Urine test ordered, however pt is incont of urine. Okay to straight cath pt per hospitalist.  Urine sent to lab per order. Pt okay to have lunch. Attempted to feed pt, he shook his head, jaw clinched. Mouthcare provided. Pt repositioned in bed. Safety maintained.

## 2023-01-17 NOTE — OR NURSING
NO SEDATION    1435 Pt brought to specials via cart. report was received from [facility]. U/S tech obtained images prior to start of procedure using U/S. Pt  VSS. 1440 Procedure explained, consent verified. Pt moaning and not answering questions at this time. Oral Gutierrez completed for Ultrasound Guided Paracentesis. Using U/S, Dr. Mark Gu marked, prepped LLQ with Chloraprep and draped with sterile drape and towels. 1452  Site numbed with lidocaine. Oeo4ctrt Centesis 5F catheter placed using Ultrasound guidance. Fluid appears clear straw. Catheter tubing connected to Direct Access Software machine to remove fluid. 1500 pt tolerating drain. Marisue Denver RN holding the pts hand. 1523  Pt tolerated well. Total 4920 ml removed. Catheter removed, pressure held and bandaid applied. Verbal and tactile reassurance given throughout. 1535 report called to [facility] ANNE MARIE BUITRAGO RN, informed of IV dressing change. Pt waiting for transport back to room via cart with transported. Not issues at this time or changes noted.

## 2023-01-17 NOTE — PROGRESS NOTES
Call from Specialty. Pt RN, pt will not have Liver biopsy today r/t INR 2.3 per Dr Timothy Salas. Pt will however still have Paracentesis this afternoon. Family updated. Pt resting peacefully eyes closed. Pt medicated earlier this shift after he reported ABD pain. Pt able to rate pain 10/10 and stated \"both sides\" when asked location of ABD pain. Will continue to monitor, pt safety maintained.

## 2023-01-17 NOTE — CONSULTS
Palliative Care Consult Note  Patient: Jurgen Henry  Gender: male  YOB: 1945  Unit/Bed: F308/H133-23  CodeStatus: Full Code  Inpatient Treatment Team: Treatment Team: Attending Provider: Celine Cleaning DO; Consulting Physician: Patricio Gama MD; Consulting Physician: Cuate Bauhg MD; Patient Care Tech: Zbigniew Samm; : Primo Edge RN; Registered Nurse: Edwina Russell. Debra Zacarias RN; Patient Care Tech: Ghanshyam Columbus; Utilization Reviewer: Shana Hall RN  Admit Date:  1/8/2023    Chief Complaint: Fatigue    History of Presenting Illness:      Jurgen Henry is a 76 y.o. male on hospital day 9 with a history of T2DM, depression. Patient was brought to the emergency room with weakness and myalgias. Patient was admitted for acute kidney injury. Palliative care was consulted for goals of care, CODE STATUS discussion, family support, and symptom management. Upon entering the room I find the patient asleep after medication administration per patient's sister Lima Rank and friend Saud Gilliland (friend). Patient's previous functional status is A&O X4 per patient's sister Lima Rank. Patient lives at home alone. Patient's sister admits to some unknown weight loss over the last. Per our conversation with his sister Segundo Lara the patient's current swelling he doesn't look like he's lost any\". Patient/health caregiver has had severe functional decline over the last month. Attempted to see patient second time today, however, patient is off the unit for a procedure. Had second conversation with patient's sister. Previous location of [facility] does not currently have a copy. Patient is confused at this time and is unable for a new copy. Updated patient's sister of severity of current illness. Gave information/education about palliative care benefits, hospice care, advance care planning, goals of care and current illness. Patient's sister was engaged and tearful during conversation.   All questions were answered before end of conversation/interaction. Most recent notable labs: CO2 16, BUN 63, creatinine 2.07 anion gap 18, estimated glomerular filtration rate 28, lactate 2.05, POC glucose 123, POC chloride 115, POC creatinine 2.3, ammonia 81, albumin 1.8, alk phos 168, bilirubin 1.2, total protein 5.4, pH arterial 7.341, PCO2 34, PO2 arterial 70, HCO3 arterial 18.3, O2 saturation arterial 93      Review of Systems:       Review of Systems   Unable to perform ROS: Acuity of condition     Physical Examination:       /71   Pulse 84   Temp 97.3 °F (36.3 °C) (Oral)   Resp 20   Ht 5' 8\" (1.727 m)   Wt 263 lb 11.2 oz (119.6 kg)   SpO2 90%   BMI 40.10 kg/m²    Physical Exam  Vitals and nursing note reviewed. Constitutional:       General: He is sleeping. He is not in acute distress. Appearance: He is morbidly obese. He is ill-appearing. HENT:      Head: Normocephalic and atraumatic. No right periorbital erythema or left periorbital erythema. Jaw: There is normal jaw occlusion. Right Ear: External ear normal. Decreased hearing noted. No laceration or swelling. Left Ear: External ear normal. Decreased hearing noted. No laceration or swelling. Ears:      Comments: Hearing aids at  home. Nose: Nose normal. No nasal deformity, signs of injury, laceration or rhinorrhea. Mouth/Throat:      Lips: Pink. No lesions. Mouth: Mucous membranes are dry. No injury or angioedema. Pharynx: No posterior oropharyngeal erythema. Eyes:      General: Lids are normal. No scleral icterus. Right eye: No discharge. Left eye: No discharge. Extraocular Movements: Extraocular movements intact. Conjunctiva/sclera: Conjunctivae normal.      Comments: Glasses   Cardiovascular:      Rate and Rhythm: Normal rate and regular rhythm. Pulses: Decreased pulses. Dorsalis pedis pulses are 1+ on the right side and 1+ on the left side.       Heart sounds: Normal heart sounds. Pulmonary:      Effort: Pulmonary effort is normal. No prolonged expiration or respiratory distress. Breath sounds: Transmitted upper airway sounds present. Decreased breath sounds present. No wheezing or rhonchi. Abdominal:      General: Abdomen is protuberant. Bowel sounds are normal. There is distension. Palpations: Abdomen is soft. Tenderness: There is no abdominal tenderness. There is no guarding or rebound. Musculoskeletal:         General: Swelling present. No tenderness or deformity. Cervical back: Normal range of motion. No edema, erythema, signs of trauma or rigidity. Right lower leg: 3+ Pitting Edema present. Left lower leg: 3+ Pitting Edema present. Skin:     General: Skin is warm and dry. Capillary Refill: Capillary refill takes less than 2 seconds. Coloration: Skin is pale. Findings: Bruising present. No abrasion, burn, laceration or rash. Nails: There is no clubbing. Neurological:      Cranial Nerves: No facial asymmetry. Motor: Weakness present. Comments: JONO R/T patient sleeping. Psychiatric:         Attention and Perception: He is inattentive.        Allergies:      No Known Allergies    Medications:      Current Facility-Administered Medications   Medication Dose Route Frequency Provider Last Rate Last Admin    [Held by provider] HYDROmorphone (DILAUDID) injection 0.25 mg  0.25 mg IntraVENous Q4H PRN Victoria Briones Sedar, DO   0.25 mg at 01/17/23 0816    insulin glargine (LANTUS) injection vial 40 Units  40 Units SubCUTAneous Nightly Alexei CASTILLO Sedar, DO        0.9 % sodium chloride infusion   IntraVENous Continuous Georgia Abad  mL/hr at 01/17/23 0137 1,000 mL at 01/17/23 0137    scopolamine (TRANSDERM-SCOP) transdermal patch 1 patch  1 patch TransDERmal Q72H Georgia Abad MD   1 patch at 01/15/23 1148    traMADol (ULTRAM) tablet 25 mg  25 mg Oral Q6H PRN Georgia Abad MD   25 mg at 01/16/23 0805    [facility] [place] by provider] furosemide (LASIX) tablet 20 mg  20 mg Oral BID Perla Caceres MD   20 mg at 01/12/23 1723    sertraline (ZOLOFT) tablet 25 mg  25 mg Oral Daily Rufino Mary MD   25 mg at 01/16/23 0805    enoxaparin Sodium (LOVENOX) injection 30 mg  30 mg SubCUTAneous BID BELKIS Fuentes - CNP   30 mg at 01/16/23 0805    ondansetron (ZOFRAN-ODT) disintegrating tablet 4 mg  4 mg Oral Q8H PRN Lyn Sarabia APRN - CNP        Or    ondansetron (ZOFRAN) injection 4 mg  4 mg IntraVENous Q6H PRN Lyn Sarabia, BELKIS - CNP   4 mg at 01/12/23 0852    polyethylene glycol (GLYCOLAX) packet 17 g  17 g Oral Daily PRN BELKIS Fuentes - CNP        insulin lispro (HUMALOG) injection vial 0-8 Units  0-8 Units SubCUTAneous TID WC BELKIS Day - CNP        insulin lispro (HUMALOG) injection vial 0-4 Units  0-4 Units SubCUTAneous Nightly BELKIS Day CNP        glucose chewable tablet 16 g  4 tablet Oral PRN BELKIS Day - CNP        dextrose bolus 10% 125 mL  125 mL IntraVENous PRN BELKIS Day CNP        Or    dextrose bolus 10% 250 mL  250 mL IntraVENous PRN BELKIS Day - CNP        glucagon (rDNA) injection 1 mg  1 mg SubCUTAneous PRN BELKIS Day - CNP        dextrose 10 % infusion   IntraVENous Continuous PRN Lyn Sarabia APRN - CNP        spironolactone (ALDACTONE) tablet 50 mg  50 mg Oral Daily Iva Hamilton MD   50 mg at 01/16/23 0805       History: PMHx:  Past Medical History:   Diagnosis Date    Diabetes mellitus ([address]        PSHx:  Past Surgical History:   Procedure Laterality Date    PARACENTESIS Left 01/10/2023    7850 ml removed by Dr. Dulce Maria Daniels - therapeutic & diagnostic       Social Hx:  Social History     Socioeconomic History    Marital status:       Spouse name: None    Number of children: None    Years of education: None    Highest education level: None   Tobacco Use    Smoking status: Never    Smokeless tobacco: Never   Vaping Use    Vaping Use: Never used Substance and Sexual Activity    Alcohol use: Never    Drug use: Never       Family Hx:  No family history on file.     LABS: Reviewed     CBC:  Lab Results   Component Value Date/Time    WBC 8.5 01/17/2023 05:05 AM    RBC 4.76 01/17/2023 05:05 AM    HGB 14.1 01/17/2023 05:05 AM    HCT 43.6 01/17/2023 05:05 AM    MCV 91.5 01/17/2023 05:05 AM    MCH 29.6 01/17/2023 05:05 AM    MCHC 32.3 01/17/2023 05:05 AM    RDW 20.1 01/17/2023 05:05 AM     01/17/2023 05:05 AM    MPV 8.9 05/01/2014 11:16 AM     CBC with Differential:   Lab Results   Component Value Date/Time    WBC 8.5 01/17/2023 05:05 AM    RBC 4.76 01/17/2023 05:05 AM    HGB 14.1 01/17/2023 05:05 AM    HCT 43.6 01/17/2023 05:05 AM     01/17/2023 05:05 AM    MCV 91.5 01/17/2023 05:05 AM    MCH 29.6 01/17/2023 05:05 AM    MCHC 32.3 01/17/2023 05:05 AM    RDW 20.1 01/17/2023 05:05 AM    LYMPHOPCT 11.5 01/17/2023 05:05 AM    MONOPCT 6.9 01/17/2023 05:05 AM    BASOPCT 0.1 01/17/2023 05:05 AM    MONOSABS 0.6 01/17/2023 05:05 AM    LYMPHSABS 1.0 01/17/2023 05:05 AM    EOSABS 0.0 01/17/2023 05:05 AM    BASOSABS 0.0 01/17/2023 05:05 AM     CMP:    Lab Results   Component Value Date/Time     01/17/2023 05:05 AM    K 4.3 01/17/2023 05:05 AM    K 4.6 01/15/2023 06:26 AM     01/17/2023 05:05 AM    CO2 16 01/17/2023 05:05 AM    BUN 63 01/17/2023 05:05 AM    CREATININE 2.07 01/17/2023 05:05 AM    GFRAA >60.0 06/09/2022 07:15 PM    LABGLOM 31.9 01/17/2023 05:05 AM    GLUCOSE 133 01/17/2023 05:05 AM    PROT 5.4 01/17/2023 05:05 AM    LABALBU 1.8 01/17/2023 05:05 AM    CALCIUM 8.1 01/17/2023 05:05 AM    BILITOT 1.2 01/17/2023 05:05 AM    ALKPHOS 168 01/17/2023 05:05 AM    AST 36 01/17/2023 05:05 AM    ALT 25 01/17/2023 05:05 AM     BMP:    Lab Results   Component Value Date/Time     01/17/2023 05:05 AM    K 4.3 01/17/2023 05:05 AM    K 4.6 01/15/2023 06:26 AM     01/17/2023 05:05 AM    CO2 16 01/17/2023 05:05 AM    BUN 63 01/17/2023 05:05 AM LABALBU 1.8 01/17/2023 05:05 AM    CREATININE 2.07 01/17/2023 05:05 AM    CALCIUM 8.1 01/17/2023 05:05 AM    GFRAA >60.0 06/09/2022 07:15 PM    LABGLOM 31.9 01/17/2023 05:05 AM    GLUCOSE 133 01/17/2023 05:05 AM     TSH:   Lab Results   Component Value Date/Time    TSH 2.620 01/08/2023 06:45 PM     Urinalysis:   Lab Results   Component Value Date/Time    NITRU Negative 01/08/2023 06:45 PM    WBCUA 3-5 01/08/2023 06:45 PM    BACTERIA Negative 01/08/2023 06:45 PM    RBCUA 10-20 01/08/2023 06:45 PM    BLOODU SMALL 01/08/2023 06:45 PM    SPECGRAV 1.021 01/08/2023 06:45 PM    GLUCOSEU Negative 01/08/2023 06:45 PM     Albumin:   Lab Results   Component Value Date    LABALBU 1.8 (L) 01/17/2023     Liver:   Lab Results   Component Value Date    ALT 25 01/17/2023    AST 36 01/17/2023    ALKPHOS 168 (H) 01/17/2023    BILITOT 1.2 (H) 01/17/2023     Weight Trends  Wt Readings from Last 10 Encounters:   01/17/23 263 lb 11.2 oz (119.6 kg)   06/09/22 250 lb (113.4 kg)   10/14/20 238 lb (108 kg)   10/05/20 231 lb 3.2 oz (104.9 kg)   08/15/19 230 lb (104.3 kg)          FUNCTIONAL ADL´S:     Independent: [  ] Eating, [   ] Dressing, [   ] Transferring, [   ] [facility] , [   ] Bita Hilliard, [   ] Continence  Dependent   : [ x ] Eating, [ x ] Dressing, [ x ] Transferring, [ x ] Toileting, [ x ] Bathing, [ x ] Continence  W. assistant : [  ] Eating, [   ] Dressing, [   ] Transferring, [   ] [facility] , [   ] Bita Hilliard, [   ] Continence    Radiology: Reviewed      CT Result (most recent):  CT ABDOMEN PELVIS W IV CONTRAST 01/10/2023    Narrative  EXAMINATION:  CT OF THE ABDOMEN AND PELVIS WITH CONTRAST 1/10/2023 9:41 am    TECHNIQUE:  CT of the abdomen and pelvis was performed with the administration of  intravenous contrast. Multiplanar reformatted images are provided for review.   Automated exposure control, iterative reconstruction, and/or weight based  adjustment of the mA/kV was utilized to reduce the radiation dose to as low  as reasonably achievable. COMPARISON:  CTA chest dated 06/09/2022    HISTORY:  ORDERING SYSTEM PROVIDED HISTORY: CIRRHOSIS  TECHNOLOGIST PROVIDED HISTORY:  Reason for exam:->CIRRHOSIS  Additional Contrast?->None  What reading provider will be dictating this exam?->CRC    FINDINGS:  Lower Chest: Lung bases are clear. Organs: Liver is heterogeneous with multifocal areas of hypoenhancement  including a coalescent area occupying much of the right hepatic lobe 14 cm in  dimension of indeterminate significance although hepatocellular carcinoma of  diffuse involvement not excluded with multifocal other areas scattered  throughout. Perihepatic ascites. Gallbladder contains numerous stones in  the dependent portion neck of the gallbladder of cholelithiasis without wall  thickening. .  Pancreas unremarkable. Spleen enlarged to 14.8 cm in  craniocaudal dimension. Adrenals without nodule. Kidneys without suspicious  renal lesion and no hydronephrosis. Low-attenuation areas bilateral of renal  cortical cysts as well as bilateral renal cortical scarring atrophy. GI/Bowel: No focal thickening or disproportion dilatation of bowel. No  inflammatory findings. Pelvis: No suspicious pelvic lesion or bulky pelvic adenopathy/free fluid. Peritoneum/Retroperitoneum: Scattered mildly enlarged retroperitoneal lymph  nodes measuring up to a periaortic left 17 mm lymph node. Aortocaval 2 cm  lymph node along with 2 cm periportal lymph nodes. Mesenteric edema with  moderate volume abdominopelvic ascites. Vasculature: Grossly normal caliber of abdominal aorta and vasculature    Bones/Soft Tissues: No acute osseous or soft tissue findings. Fat containing  left direct inguinal hernia.     Impression  Progressive hepatic disease with low-attenuation areas scattered throughout  diffusely becoming coalescent or confluent up to 14 cm in the right hepatic  lobe occupying much of the right hepatic dome appears grossly progressed from  06/09/2022 CTA chest comparison with partial imaging was performed of the  upper abdomen. Underline hepatocellular carcinoma with diffuse involvement  not excluded. Perihepatic ascites with sequela portal hypertension including  splenomegaly. Periportal and retroperitoneal adenopathy additionally noted. Moderate ascites    US Result (most recent):  US DUP ABD PEL RETRO SCROT COMP 01/09/2023    Narrative  EXAMINATION:  DOPPLER EVALUATION OF THE PELVIS    1/9/2023 12:34 pm    COMPARISON:  None. HISTORY:  ORDERING SYSTEM PROVIDED HISTORY: elevated LFTS  TECHNOLOGIST PROVIDED HISTORY:  Reason for exam:->elevated LFTS  What reading provider will be dictating this exam?->CRC    FINDINGS:  Echogenic liver with nodular contour. Perihepatic ascites. A 1.3 cm cyst is  noted in the liver, which appears to be in the right lobe. Color Doppler  flow is noted in the main hepatic vein, hepatic artery, and left hepatic  vein. The main portal vein and right portal vein demonstrate irregular color  Doppler flow on this exam.  The right and left hepatic veins are suboptimally  visualized. The splenic vein is suboptimally visualized. The flow in the  vessel labeled hepatic artery appears turbulent and high velocity. Cholelithiasis and gallbladder sludge noted. No pericholecystic fluid or  obvious gallbladder wall thickening. Carmen Child sign was not documented on  technologist where she. Suboptimal visualization of the IVC. Four-quadrant ascites. Impression  Findings suggestive of cirrhosis. There is moderate to large volume of  ascites. Limited visualization of the portal venous system demonstrates color Doppler  flow in the left portal vein, but irregular color Doppler flow in the main  and right portal veins. Consider CT of the abdomen/pelvis with IV contrast  for further evaluation of the portal venous system.       Assessment and plan:  Palliative care encounter: Spoke at length to patient's sister and friend Michaelle Herbert at bedside. Patient is palliative care appropriate but with patient current acuity of condition he is unable to fully understand. Continue with current plan of care, no changes. Palliative care will continue to follow of the patient is hospitalized. Hospice: Patient is hospice eligible in the setting of recently found liver cirrhosis with highly suspected hepatocellular carcinoma, malignant cachexia, declining functional status with PPS at 10 percent, unintentional weight loss with hypoalbuminemia at 1.8, lethargy and weakness, and multiple comorbidities. However, this is not currently consistent with patient or patient's family goals of care. I have discussed/reviewed the patient's case with nursing staff and case management. 3.  Advance Care Planning  Discussed goals of care with patient. Explained in extensive detail nuances between full code, DNR CCA and DNR CC. Patient has made the decision to be FULL CODE. Patient's previous copy of POA is not available. Will continue to monitor patient's cognition and when appropriate order spiritual services for new POA paperwork. 4.  Goals of Care Discussion:  Disease process and goals of treatment were discussed in basic terms. Alex's goal is to optimize available comfort care measures and continue with current plan of care with aggressive treatments. We discussed the palliative care philosophy in light of those goals. We discussed all care options contingent on treatment response and QOL. Much active listening, presence, and emotional support were given.      -Patient is currently being treated for multiple medical conditions by other providers  SALOME on CKD  Diabetes T2DM  Elevated liver enzymes  Suicidal ideation  General weakness  Lactic acidosis  Elevated troponin  Current severe episode of major depressive disorder without psychotic features without prior episode  Abdominal distention  Lower extremity swelling  Cirrhosis with ascites, rule out hepatocellular carcinoma  Hepatocellular carcinoma  Cirrhosis of liver with portal hypertension  Liver mass with elevated alpha-fetoprotein  Severe depression  Hypoproteinemia  Malignant cachexia  Morbid obesity  Hyperkalemia    MDM: High    Electronically signed by BELKIS Ballard CNP on 1/17/2023 at 9:59 AM    Collaborating physician: Dr. Rhonda Bates     Please note this report is partially produced by using speech recognition hardware. It may contain errors related to the system, including grammar, punctuation and spelling as well as words and phrases that may seem inaccurate. For any questions or concerns feel free to contact me for clarification. Thanks for the opportunity you have allowed us to provide palliative care to Cee Littlellor. We will be in touch as care progresses. Please feel free to reach out to us should you have any questions or requests.

## 2023-01-18 PROBLEM — R13.10 DYSPHAGIA: Status: ACTIVE | Noted: 2023-01-01

## 2023-01-18 LAB
ALBUMIN SERPL-MCNC: 1.9 G/DL (ref 3.5–4.6)
ALP BLD-CCNC: 175 U/L (ref 35–104)
ALT SERPL-CCNC: 27 U/L (ref 0–41)
ANION GAP SERPL CALCULATED.3IONS-SCNC: 16 MEQ/L (ref 9–15)
AST SERPL-CCNC: 39 U/L (ref 0–40)
BASOPHILS ABSOLUTE: 0 K/UL (ref 0–0.2)
BASOPHILS RELATIVE PERCENT: 0.1 %
BILIRUB SERPL-MCNC: 1.2 MG/DL (ref 0.2–0.7)
BILIRUBIN DIRECT: 0.9 MG/DL (ref 0–0.4)
BILIRUBIN, INDIRECT: 0.3 MG/DL (ref 0–0.6)
BUN BLDV-MCNC: 72 MG/DL (ref 8–23)
CALCIUM SERPL-MCNC: 8.7 MG/DL (ref 8.5–9.9)
CHLORIDE BLD-SCNC: 110 MEQ/L (ref 95–107)
CO2: 17 MEQ/L (ref 20–31)
CREAT SERPL-MCNC: 2.47 MG/DL (ref 0.7–1.2)
EOSINOPHILS ABSOLUTE: 0 K/UL (ref 0–0.7)
EOSINOPHILS RELATIVE PERCENT: 0 %
GFR SERPL CREATININE-BSD FRML MDRD: 25.8 ML/MIN/{1.73_M2}
GLUCOSE BLD-MCNC: 123 MG/DL (ref 70–99)
GLUCOSE BLD-MCNC: 124 MG/DL (ref 70–99)
GLUCOSE BLD-MCNC: 138 MG/DL (ref 70–99)
GLUCOSE BLD-MCNC: 146 MG/DL (ref 70–99)
HCT VFR BLD CALC: 46.1 % (ref 42–52)
HEMOGLOBIN: 14.8 G/DL (ref 14–18)
INR BLD: 2.7
LYMPHOCYTES ABSOLUTE: 1 K/UL (ref 1–4.8)
LYMPHOCYTES RELATIVE PERCENT: 10.6 %
MCH RBC QN AUTO: 29.3 PG (ref 27–31.3)
MCHC RBC AUTO-ENTMCNC: 32 % (ref 33–37)
MCV RBC AUTO: 91.4 FL (ref 79–92.2)
MONOCYTES ABSOLUTE: 0.6 K/UL (ref 0.2–0.8)
MONOCYTES RELATIVE PERCENT: 6.3 %
NEUTROPHILS ABSOLUTE: 7.8 K/UL (ref 1.4–6.5)
NEUTROPHILS RELATIVE PERCENT: 83 %
PDW BLD-RTO: 20 % (ref 11.5–14.5)
PERFORMED ON: ABNORMAL
PLATELET # BLD: 166 K/UL (ref 130–400)
POTASSIUM SERPL-SCNC: 4.5 MEQ/L (ref 3.4–4.9)
PROTHROMBIN TIME: 29 SEC (ref 12.3–14.9)
RBC # BLD: 5.04 M/UL (ref 4.7–6.1)
SODIUM BLD-SCNC: 143 MEQ/L (ref 135–144)
TOTAL PROTEIN: 5.9 G/DL (ref 6.3–8)
WBC # BLD: 9.4 K/UL (ref 4.8–10.8)

## 2023-01-18 PROCEDURE — 80048 BASIC METABOLIC PNL TOTAL CA: CPT

## 2023-01-18 PROCEDURE — 85610 PROTHROMBIN TIME: CPT

## 2023-01-18 PROCEDURE — 2580000003 HC RX 258: Performed by: INTERNAL MEDICINE

## 2023-01-18 PROCEDURE — 99232 SBSQ HOSP IP/OBS MODERATE 35: CPT

## 2023-01-18 PROCEDURE — 36415 COLL VENOUS BLD VENIPUNCTURE: CPT

## 2023-01-18 PROCEDURE — 6360000002 HC RX W HCPCS

## 2023-01-18 PROCEDURE — 97535 SELF CARE MNGMENT TRAINING: CPT

## 2023-01-18 PROCEDURE — 85025 COMPLETE CBC W/AUTO DIFF WBC: CPT

## 2023-01-18 PROCEDURE — 80076 HEPATIC FUNCTION PANEL: CPT

## 2023-01-18 PROCEDURE — 6360000002 HC RX W HCPCS: Performed by: INTERNAL MEDICINE

## 2023-01-18 PROCEDURE — 6370000000 HC RX 637 (ALT 250 FOR IP): Performed by: INTERNAL MEDICINE

## 2023-01-18 PROCEDURE — 2060000000 HC ICU INTERMEDIATE R&B

## 2023-01-18 RX ORDER — LACTULOSE 10 G/15ML
20 SOLUTION ORAL 3 TIMES DAILY
Status: DISCONTINUED | OUTPATIENT
Start: 2023-01-18 | End: 2023-01-18

## 2023-01-18 RX ADMIN — Medication 1000 ML: at 21:00

## 2023-01-18 RX ADMIN — ENOXAPARIN SODIUM 30 MG: 100 INJECTION SUBCUTANEOUS at 21:00

## 2023-01-18 RX ADMIN — CEFTRIAXONE SODIUM 1000 MG: 1 INJECTION, POWDER, FOR SOLUTION INTRAMUSCULAR; INTRAVENOUS at 18:58

## 2023-01-18 ASSESSMENT — VISUAL ACUITY: OU: 1

## 2023-01-18 NOTE — PROGRESS NOTES
Palliative Care Progress Note  Patient: Betty العراقي  Gender: male  YOB: 1945  Unit/Bed: M262/Q522-82  CodeStatus: DNR-CCA  Inpatient Treatment Team: Treatment Team: Attending Provider: Sae Young DO; Consulting Physician: Kevin Friedman MD; Consulting Physician: Fanta Elam MD; Patient Care Tech: Discrete Sport; Utilization Reviewer: Sheri Combs RN; Registered Nurse: Dedrick Fan RN; : Devin Mcintosh RN  Admit Date:  1/8/2023    Chief Complaint: Fatigue    History of Presenting Illness:      Betty العراقي is a 76 y.o. male on hospital day 10 with a history of T2DM, depression. Patient was brought to the emergency room with weakness and myalgias. Patient was admitted for acute kidney injury. Palliative care was consulted for goals of care, CODE STATUS discussion, family support, and symptom management. Upon entering the room I find the patient asleep after medication administration per patient's sister Tricia Duong and friend Oriana Pilar (friend). Patient's previous functional status is A&O X4 per patient's sister Tricia Duong. Patient lives at home alone. Patient's sister admits to some unknown weight loss over the last. Per our conversation with his sister Nayan Avery the patient's current swelling he doesn't look like he's lost any\". Patient/health caregiver has had severe functional decline over the last month. Attempted to see patient second time today, however, patient is off the unit for a procedure. Had second conversation with patient's sister. Previous location of [facility] does not currently have a copy. Patient is confused at this time and is unable for a new copy. Updated patient's sister of severity of current illness. Gave information/education about palliative care benefits, hospice care, advance care planning, goals of care and current illness. Patient's sister was engaged and tearful during conversation.   All questions were answered before end of conversation/interaction. Most recent notable labs: CO2 16, BUN 63, creatinine 2.07 anion gap 18, estimated glomerular filtration rate 28, lactate 2.05, POC glucose 123, POC chloride 115, POC creatinine 2.3, ammonia 81, albumin 1.8, alk phos 168, bilirubin 1.2, total protein 5.4, pH arterial 7.341, PCO2 34, PO2 arterial 70, HCO3 arterial 18.3, O2 saturation arterial 93    1/18/2023:  Upon entering the room I find the patient laying in bed with sister Lisa Red and patient's friend at bedside. Patient is alert and oriented x4 today but is very lethargic/weak. Per conversation with nursing staff patient is unable to swallow ice chips at this time. Patient communicated that he would like to change his CODE STATUS to [facility] as he does not want chest compressions or intubation. Patient would like to further pursue quality of life measures after updating him on the current severity of his condition. Since patient is now alert and oriented and able to communicate, ordered spiritual services to attempt power of  as he has named his sister Lisa Red. All questions and concerns were answered before leaving room. Updated nursing staff and hospitalist of patient's want for hospice consult. Most recent notable labs: Chloride 110, CO2 17, BUN 72, creatinine 2.47, anion gap 16, estimated glomerular filtration rate 25.8, POC glucose 124, total protein 5.9, albumin 1.9, alk phos 175, bilirubin 1.2, PT 29.0, INR 2.7, urinalysis positive with moderate leukocyte Estrace, trace ketones, dark yellow color with many bacteria and white blood cells . Review of Systems:       Review of Systems   Unable to perform ROS: Acuity of condition     Physical Examination:       /78   Pulse 94   Temp 97.7 °F (36.5 °C) (Axillary)   Resp 18   Ht 5' 8\" (1.727 m)   Wt 263 lb 11.2 oz (119.6 kg)   SpO2 98%   BMI 40.10 kg/m²    Physical Exam  Vitals and nursing note reviewed.    Constitutional:       General: He is awake. He is not in acute distress. Appearance: He is well-developed. He is morbidly obese. He is ill-appearing and toxic-appearing. HENT:      Head: Normocephalic and atraumatic. No right periorbital erythema or left periorbital erythema. Jaw: There is normal jaw occlusion. Right Ear: External ear normal. Decreased hearing noted. No laceration or swelling. Left Ear: External ear normal. Decreased hearing noted. No laceration or swelling. Ears:      Comments: Hearing aids at  home. Nose: Nose normal. No nasal deformity, signs of injury, laceration or rhinorrhea. Mouth/Throat:      Lips: Pink. No lesions. Mouth: Mucous membranes are dry. No injury or angioedema. Eyes:      General: Lids are normal. Vision grossly intact. Gaze aligned appropriately. No scleral icterus. Right eye: No discharge. Left eye: No discharge. Extraocular Movements: Extraocular movements intact. Conjunctiva/sclera: Conjunctivae normal.      Pupils: Pupils are equal, round, and reactive to light. Comments: Glasses   Cardiovascular:      Rate and Rhythm: Normal rate and regular rhythm. Pulses: Decreased pulses. Dorsalis pedis pulses are 1+ on the right side and detected w/ Doppler on the left side. Heart sounds: Normal heart sounds. Comments: BUE edmea +3 pitting. Pulmonary:      Effort: Pulmonary effort is normal. No prolonged expiration or respiratory distress. Breath sounds: Decreased breath sounds present. No wheezing or rhonchi. Abdominal:      General: Abdomen is protuberant. Bowel sounds are normal. There is distension. Palpations: Abdomen is soft. Tenderness: There is abdominal tenderness in the left lower quadrant. There is no guarding or rebound. Comments: Unable to deeply palpate related to tenderness. Musculoskeletal:         General: Swelling (Generalized) present. No tenderness or deformity.       Cervical back: Normal range of motion. No edema, erythema, signs of trauma or rigidity. Right lower le+ Pitting Edema present. Left lower le+ Pitting Edema present. Skin:     General: Skin is warm and dry. Capillary Refill: Capillary refill takes less than 2 seconds. Coloration: Skin is cyanotic (LLE), mottled (LLE) and pale. Findings: Bruising present. No abrasion, burn, laceration or rash. Nails: There is no clubbing. Comments: Skin weeping BUE. Neurological:      Mental Status: He is alert and easily aroused. Cranial Nerves: No facial asymmetry. Motor: Weakness present. Comments: JONO R/T patient sleeping. Psychiatric:         Attention and Perception: He is inattentive. Mood and Affect: Affect is flat. Speech: Speech is delayed. Behavior: Behavior is withdrawn. Behavior is cooperative.          Cognition and Memory: Cognition normal.       Allergies:      No Known Allergies    Medications:      Current Facility-Administered Medications   Medication Dose Route Frequency Provider Last Rate Last Admin    lactulose (CHRONULAC) 10 GM/15ML solution 20 g  20 g Oral TID Danette Thomasar, DO   20 g at 23 2201    cefTRIAXone (ROCEPHIN) 1,000 mg in dextrose 5 % 50 mL IVPB mini-bag  1,000 mg IntraVENous Q24H Danette Thomasar, DO   Stopped at 23 1843    [Held by provider] HYDROmorphone (DILAUDID) injection 0.25 mg  0.25 mg IntraVENous Q4H PRN Danette Cosby, DO   0.25 mg at 23 0816    insulin glargine (LANTUS) injection vial 40 Units  40 Units SubCUTAneous Nightly Danette Thomasar, DO        scopolamine (TRANSDERM-SCOP) transdermal patch 1 patch  1 patch TransDERmal Q72H Alex Solis MD   1 patch at 01/15/23 1148    traMADol (ULTRAM) tablet 25 mg  25 mg Oral Q6H PRN Alex Solis MD   25 mg at 23 0805    [Held by provider] furosemide (LASIX) tablet 20 mg  20 mg Oral BID Alex Solis MD   20 mg at 23 1723    sertraline (ZOLOFT) tablet 25 mg 25 mg Oral Daily Cullen Grier MD   25 mg at 01/16/23 0805    enoxaparin Sodium (LOVENOX) injection 30 mg  30 mg SubCUTAneous BID BELKIS Centeno CNP   30 mg at 01/17/23 2200    ondansetron (ZOFRAN-ODT) disintegrating tablet 4 mg  4 mg Oral Q8H PRN BELKIS Centeno CNP        Or    ondansetron (ZOFRAN) injection 4 mg  4 mg IntraVENous Q6H PRN BELKIS Centeno - CNP   4 mg at 01/12/23 0852    polyethylene glycol (GLYCOLAX) packet 17 g  17 g Oral Daily PRN BELKIS Centeno CNP        insulin lispro (HUMALOG) injection vial 0-8 Units  0-8 Units SubCUTAneous TID WC BELKIS Day - CNP        insulin lispro (HUMALOG) injection vial 0-4 Units  0-4 Units SubCUTAneous Nightly BELKIS Day CNP        glucose chewable tablet 16 g  4 tablet Oral PRN BELKIS Day CNP        dextrose bolus 10% 125 mL  125 mL IntraVENous PRN BELKIS Day CNP        Or    dextrose bolus 10% 250 mL  250 mL IntraVENous PRN BELKIS Day CNP        glucagon (rDNA) injection 1 mg  1 mg SubCUTAneous PRN BELKIS Day - CNP        dextrose 10 % infusion   IntraVENous Continuous PRN BELKIS Centeno - JUSTINA        spironolactone (ALDACTONE) tablet 50 mg  50 mg Oral Daily Jayesh Murray MD   50 mg at 01/16/23 0805       History: PMHx:  Past Medical History:   Diagnosis Date    Diabetes mellitus [facility])        PSHx:  Past Surgical History:   Procedure Laterality Date    PARACENTESIS Left 01/10/2023    7850 ml removed by Dr. Princess Lion - therapeutic & diagnostic    PARACENTESIS Left 01/17/2023    4920 ml removed by Dr Tan Barragan Hx:  Social History     Socioeconomic History    Marital status:       Spouse name: None    Number of children: None    Years of education: None    Highest education level: None   Tobacco Use    Smoking status: Never    Smokeless tobacco: Never   Vaping Use    Vaping Use: Never used   Substance and Sexual Activity    Alcohol use: Never    Drug use: Never Family Hx:  No family history on file.     LABS: Reviewed     CBC:  Lab Results   Component Value Date/Time    WBC 9.4 01/18/2023 04:53 AM    RBC 5.04 01/18/2023 04:53 AM    HGB 14.8 01/18/2023 04:53 AM    HCT 46.1 01/18/2023 04:53 AM    MCV 91.4 01/18/2023 04:53 AM    MCH 29.3 01/18/2023 04:53 AM    MCHC 32.0 01/18/2023 04:53 AM    RDW 20.0 01/18/2023 04:53 AM     01/18/2023 04:53 AM    MPV 8.9 05/01/2014 11:16 AM     CBC with Differential:   Lab Results   Component Value Date/Time    WBC 9.4 01/18/2023 04:53 AM    RBC 5.04 01/18/2023 04:53 AM    HGB 14.8 01/18/2023 04:53 AM    HCT 46.1 01/18/2023 04:53 AM     01/18/2023 04:53 AM    MCV 91.4 01/18/2023 04:53 AM    MCH 29.3 01/18/2023 04:53 AM    MCHC 32.0 01/18/2023 04:53 AM    RDW 20.0 01/18/2023 04:53 AM    LYMPHOPCT 10.6 01/18/2023 04:53 AM    MONOPCT 6.3 01/18/2023 04:53 AM    BASOPCT 0.1 01/18/2023 04:53 AM    MONOSABS 0.6 01/18/2023 04:53 AM    LYMPHSABS 1.0 01/18/2023 04:53 AM    EOSABS 0.0 01/18/2023 04:53 AM    BASOSABS 0.0 01/18/2023 04:53 AM     CMP:    Lab Results   Component Value Date/Time     01/18/2023 04:53 AM    K 4.5 01/18/2023 04:53 AM    K 4.6 01/15/2023 06:26 AM     01/18/2023 04:53 AM    CO2 17 01/18/2023 04:53 AM    BUN 72 01/18/2023 04:53 AM    CREATININE 2.47 01/18/2023 04:53 AM    GFRAA >60.0 06/09/2022 07:15 PM    LABGLOM 25.8 01/18/2023 04:53 AM    GLUCOSE 146 01/18/2023 04:53 AM    PROT 5.9 01/18/2023 04:53 AM    LABALBU 1.9 01/18/2023 04:53 AM    CALCIUM 8.7 01/18/2023 04:53 AM    BILITOT 1.2 01/18/2023 04:53 AM    ALKPHOS 175 01/18/2023 04:53 AM    AST 39 01/18/2023 04:53 AM    ALT 27 01/18/2023 04:53 AM     BMP:    Lab Results   Component Value Date/Time     01/18/2023 04:53 AM    K 4.5 01/18/2023 04:53 AM    K 4.6 01/15/2023 06:26 AM     01/18/2023 04:53 AM    CO2 17 01/18/2023 04:53 AM    BUN 72 01/18/2023 04:53 AM    LABALBU 1.9 01/18/2023 04:53 AM    CREATININE 2.47 01/18/2023 04:53 AM CALCIUM 8.7 01/18/2023 04:53 AM    GFRAA >60.0 06/09/2022 07:15 PM    LABGLOM 25.8 01/18/2023 04:53 AM    GLUCOSE 146 01/18/2023 04:53 AM     TSH:   Lab Results   Component Value Date/Time    TSH 2.620 01/08/2023 06:45 PM     Urinalysis:   Lab Results   Component Value Date/Time    NITRU Negative 01/17/2023 12:57 PM    WBCUA  01/17/2023 12:57 PM    BACTERIA MANY 01/17/2023 12:57 PM    RBCUA 0-2 01/17/2023 12:57 PM    BLOODU Negative 01/17/2023 12:57 PM    SPECGRAV 1.016 01/17/2023 12:57 PM    GLUCOSEU Negative 01/17/2023 12:57 PM     Albumin:   Lab Results   Component Value Date    LABALBU 1.9 (L) 01/18/2023     Liver:   Lab Results   Component Value Date    ALT 27 01/18/2023    AST 39 01/18/2023    ALKPHOS 175 (H) 01/18/2023    BILITOT 1.2 (H) 01/18/2023     Weight Trends  Wt Readings from Last 10 Encounters:   01/17/23 263 lb 11.2 oz (119.6 kg)   06/09/22 250 lb (113.4 kg)   10/14/20 238 lb (108 kg)   10/05/20 231 lb 3.2 oz (104.9 kg)   08/15/19 230 lb (104.3 kg)          FUNCTIONAL ADL´S:     Independent: [  ] Eating, [   ] Dressing, [   ] Transferring, [   ] Maryfrvesna Garcia, [   ] Tommy Vogt, [   ] Continence  Dependent   : [ x ] Eating, [ x ] Dressing, [ x ] Transferring, [ x ] Toileting, [ x ] Bathing, [ x ] Continence  W. assistant : [  ] Eating, [   ] Dressing, [   ] Transferring, [   ] Maryfrvesna Garcia, [   ] Tommy Vogt, [   ] Continence    Radiology: Reviewed      CT Result (most recent):  CT ABDOMEN PELVIS W IV CONTRAST 01/10/2023    Narrative  EXAMINATION:  CT OF THE ABDOMEN AND PELVIS WITH CONTRAST 1/10/2023 9:41 am    TECHNIQUE:  CT of the abdomen and pelvis was performed with the administration of  intravenous contrast. Multiplanar reformatted images are provided for review. Automated exposure control, iterative reconstruction, and/or weight based  adjustment of the mA/kV was utilized to reduce the radiation dose to as low  as reasonably achievable.     COMPARISON:  CTA chest dated 06/09/2022    HISTORY:  ORDERING SYSTEM PROVIDED HISTORY: CIRRHOSIS  TECHNOLOGIST PROVIDED HISTORY:  Reason for exam:->CIRRHOSIS  Additional Contrast?->None  What reading provider will be dictating this exam?->CRC    FINDINGS:  Lower Chest: Lung bases are clear. Organs: Liver is heterogeneous with multifocal areas of hypoenhancement  including a coalescent area occupying much of the right hepatic lobe 14 cm in  dimension of indeterminate significance although hepatocellular carcinoma of  diffuse involvement not excluded with multifocal other areas scattered  throughout. Perihepatic ascites. Gallbladder contains numerous stones in  the dependent portion neck of the gallbladder of cholelithiasis without wall  thickening. .  Pancreas unremarkable. Spleen enlarged to 14.8 cm in  craniocaudal dimension. Adrenals without nodule. Kidneys without suspicious  renal lesion and no hydronephrosis. Low-attenuation areas bilateral of renal  cortical cysts as well as bilateral renal cortical scarring atrophy. GI/Bowel: No focal thickening or disproportion dilatation of bowel. No  inflammatory findings. Pelvis: No suspicious pelvic lesion or bulky pelvic adenopathy/free fluid. Peritoneum/Retroperitoneum: Scattered mildly enlarged retroperitoneal lymph  nodes measuring up to a periaortic left 17 mm lymph node. Aortocaval 2 cm  lymph node along with 2 cm periportal lymph nodes. Mesenteric edema with  moderate volume abdominopelvic ascites. Vasculature: Grossly normal caliber of abdominal aorta and vasculature    Bones/Soft Tissues: No acute osseous or soft tissue findings. Fat containing  left direct inguinal hernia.     Impression  Progressive hepatic disease with low-attenuation areas scattered throughout  diffusely becoming coalescent or confluent up to 14 cm in the right hepatic  lobe occupying much of the right hepatic dome appears grossly progressed from  06/09/2022 CTA chest comparison with partial imaging was performed of the  upper abdomen. Underline hepatocellular carcinoma with diffuse involvement  not excluded. Perihepatic ascites with sequela portal hypertension including  splenomegaly. Periportal and retroperitoneal adenopathy additionally noted. Moderate ascites    US Result (most recent):  US DUP ABD PEL RETRO SCROT COMP 01/09/2023    Narrative  EXAMINATION:  DOPPLER EVALUATION OF THE PELVIS    1/9/2023 12:34 pm    COMPARISON:  None. HISTORY:  ORDERING SYSTEM PROVIDED HISTORY: elevated LFTS  TECHNOLOGIST PROVIDED HISTORY:  Reason for exam:->elevated LFTS  What reading provider will be dictating this exam?->CRC    FINDINGS:  Echogenic liver with nodular contour. Perihepatic ascites. A 1.3 cm cyst is  noted in the liver, which appears to be in the right lobe. Color Doppler  flow is noted in the main hepatic vein, hepatic artery, and left hepatic  vein. The main portal vein and right portal vein demonstrate irregular color  Doppler flow on this exam.  The right and left hepatic veins are suboptimally  visualized. The splenic vein is suboptimally visualized. The flow in the  vessel labeled hepatic artery appears turbulent and high velocity. Cholelithiasis and gallbladder sludge noted. No pericholecystic fluid or  obvious gallbladder wall thickening. Beverlyn Peals sign was not documented on  technologist where she. Suboptimal visualization of the IVC. Four-quadrant ascites. Impression  Findings suggestive of cirrhosis. There is moderate to large volume of  ascites. Limited visualization of the portal venous system demonstrates color Doppler  flow in the left portal vein, but irregular color Doppler flow in the main  and right portal veins. Consider CT of the abdomen/pelvis with IV contrast  for further evaluation of the portal venous system. Assessment and plan:  Palliative care encounter: Spoke at length to patient's sister and friend Yamil Valenzuela at bedside.   Patient is palliative care appropriate but with patient current acuity of condition he is unable to fully understand. Continue with current plan of care, no changes. Palliative care will continue to follow of the patient is hospitalized. Hospice: Patient is hospice eligible in the setting of recently found liver cirrhosis with highly suspected hepatocellular carcinoma, liver cirrhosis, SALOME on CKD, cirrhosis induced anticoagulopathy with elevated INR 2.7, malignant cachexia, declining functional status with PPS at 10 percent, hypoalbuminemia at 1.9, lethargy and weakness, and multiple comorbidities. Patient would like to now focus on quality of life measures, reached out to hospitalist for hospice consult. I have discussed/reviewed the patient's case with nursing staff and case management. 3.  Advance Care Planning  Discussed goals of care with patient. Explained in extensive detail nuances between full code, DNR CCA and DNR CC. Patient has made the decision to be DNR-CCA. Patient's previous copy of POA is not available. Will continue to monitor patient's cognition and when appropriate order spiritual services for new POA paperwork. 4.  Goals of Care Discussion:  Disease process and goals of treatment were discussed in basic terms. Alex's goal is to optimize available comfort care measures and continue with current plan of care with aggressive treatments. We discussed the palliative care philosophy in light of those goals. We discussed all care options contingent on treatment response and QOL. Much active listening, presence, and emotional support were given. 1/18/2023:  -Changed CODE STATUS to DNR-CCA per patient request.  -Ordered spiritual services as patient is now A&O x4 and would like to elect his sister Juliana Jackman.   -Communicated with nursing staff about patient wishes for hospice information/consult and communicated with hospitalist via perfect serve for hospice consult.  -Palliative care will tentatively follow-up as patient will have hospice meeting/consult. I have reviewed/discussed the patient's case with nursing staff and attending hospitalist.    -Patient is currently being treated for multiple medical conditions by other providers  SALOME on CKD  Diabetes T2DM  Elevated liver enzymes  Suicidal ideation  General weakness  Lactic acidosis  Elevated troponin  Current severe episode of major depressive disorder without psychotic features without prior episode  Abdominal distention  Lower extremity swelling  Cirrhosis with ascites, rule out hepatocellular carcinoma  Hepatocellular carcinoma  Cirrhosis of liver with portal hypertension  Liver mass with elevated alpha-fetoprotein  Severe depression  Hypoproteinemia  Malignant cachexia  Morbid obesity  Hyperkalemia    MDM: Moderate    Electronically signed by BELKIS Damico CNP on 1/18/2023 at 1:19 PM    Collaborating physician: Dr. Debbie Mendez     Please note this report is partially produced by using speech recognition hardware. It may contain errors related to the system, including grammar, punctuation and spelling as well as words and phrases that may seem inaccurate. For any questions or concerns feel free to contact me for clarification. Thanks for the opportunity you have allowed us to provide palliative care to Zandra Goltz. We will be in touch as care progresses. Please feel free to reach out to us should you have any questions or requests.

## 2023-01-18 NOTE — PROGRESS NOTES
Hematology/Oncology  Attending Progress Note        CHIEF COMPLAINT/HPI:  Pt  non-verbal but responds  to questions    REVIEW OF SYSTEMS:    Unremarkable except for symptoms mentioned in HPI.     Current Inpatient Medications:    Current Facility-Administered Medications: lactulose in sterile water enema 1,000 mL, 1 enema, Rectal, TID  cefTRIAXone (ROCEPHIN) 1,000 mg in dextrose 5 % 50 mL IVPB mini-bag, 1,000 mg, IntraVENous, Q24H  [Held by provider] HYDROmorphone (DILAUDID) injection 0.25 mg, 0.25 mg, IntraVENous, Q4H PRN  [Held by provider] insulin glargine (LANTUS) injection vial 40 Units, 40 Units, SubCUTAneous, Nightly  scopolamine (TRANSDERM-SCOP) transdermal patch 1 patch, 1 patch, TransDERmal, Q72H  traMADol (ULTRAM) tablet 25 mg, 25 mg, Oral, Q6H PRN  [Held by provider] furosemide (LASIX) tablet 20 mg, 20 mg, Oral, BID  sertraline (ZOLOFT) tablet 25 mg, 25 mg, Oral, Daily  enoxaparin Sodium (LOVENOX) injection 30 mg, 30 mg, SubCUTAneous, BID  ondansetron (ZOFRAN-ODT) disintegrating tablet 4 mg, 4 mg, Oral, Q8H PRN **OR** ondansetron (ZOFRAN) injection 4 mg, 4 mg, IntraVENous, Q6H PRN  polyethylene glycol (GLYCOLAX) packet 17 g, 17 g, Oral, Daily PRN  insulin lispro (HUMALOG) injection vial 0-8 Units, 0-8 Units, SubCUTAneous, TID WC  insulin lispro (HUMALOG) injection vial 0-4 Units, 0-4 Units, SubCUTAneous, Nightly  glucose chewable tablet 16 g, 4 tablet, Oral, PRN  dextrose bolus 10% 125 mL, 125 mL, IntraVENous, PRN **OR** dextrose bolus 10% 250 mL, 250 mL, IntraVENous, PRN  glucagon (rDNA) injection 1 mg, 1 mg, SubCUTAneous, PRN  dextrose 10 % infusion, , IntraVENous, Continuous PRN  spironolactone (ALDACTONE) tablet 50 mg, 50 mg, Oral, Daily    PHYSICAL EXAM:    VITALS:  /78   Pulse 94   Temp 97.7 °F (36.5 °C) (Axillary)   Resp 18   Ht 5' 8\" (1.727 m)   Wt 263 lb 11.2 oz (119.6 kg)   SpO2 98%   BMI 40.10 kg/m²   INTAKE/OUTPUT:  No intake or output data in the 24 hours ending 01/18/23 1853  CONSTITUTIONAL:  awake, alert, cooperative, no apparent distress, and appears stated age  ENT:  Normocephalic, without obvious abnormality, atraumatic,No epistaxis; no oral mucosal bleeding. NECK:  Supple, symmetrical, trachea midline, no adenopathy,   LUNGS:  No increased work of breathing, good air exchange, clear to auscultation bilaterally, no crackles or wheezing  CARDIOVASCULAR:  Normal HS,  regular rate and rhythm  ABDOMEN: soft, non-distended, +mild tenderness over RUQ; ,no masses palpated,no hepatosplenomegaly  MUSCULOSKELETAL:  There is no pedal edema; no calf tenderness  NEUROLOGIC:  Awake, alert, oriented to name, place and time.   Non-focal  SKIN:  + ecchymoses on arms    DATA:      PT/INR:  No results found for: PTINR  PTT:    Lab Results   Component Value Date/Time    APTT 33.6 01/08/2023 07:27 PM     CMP:    Lab Results   Component Value Date/Time     01/18/2023 04:53 AM    K 4.5 01/18/2023 04:53 AM    K 4.6 01/15/2023 06:26 AM     01/18/2023 04:53 AM    CO2 17 01/18/2023 04:53 AM    BUN 72 01/18/2023 04:53 AM    PROT 5.9 01/18/2023 04:53 AM     Magnesium:    Lab Results   Component Value Date/Time    MG 2.4 01/08/2023 06:45 PM     Phosphorus:  No components found for: PO4  Calcium:  No results found for: CA  CBC:    Lab Results   Component Value Date/Time    WBC 9.4 01/18/2023 04:53 AM    RBC 5.04 01/18/2023 04:53 AM    HGB 14.8 01/18/2023 04:53 AM    HCT 46.1 01/18/2023 04:53 AM    MCV 91.4 01/18/2023 04:53 AM    RDW 20.0 01/18/2023 04:53 AM     01/18/2023 04:53 AM     DIFF:    Lab Results   Component Value Date/Time    MCV 91.4 01/18/2023 04:53 AM    RDW 20.0 01/18/2023 04:53 AM      LDH:  No results found for: LDH  Uric Acid:  No components found for: URIC    CBC with Differential:    Lab Results   Component Value Date/Time    WBC 9.4 01/18/2023 04:53 AM    RBC 5.04 01/18/2023 04:53 AM    HGB 14.8 01/18/2023 04:53 AM    HCT 46.1 01/18/2023 04:53 AM     01/18/2023 04:53 AM    MCV 91.4 01/18/2023 04:53 AM    MCH 29.3 01/18/2023 04:53 AM    MCHC 32.0 01/18/2023 04:53 AM    RDW 20.0 01/18/2023 04:53 AM    LYMPHOPCT 10.6 01/18/2023 04:53 AM    MONOPCT 6.3 01/18/2023 04:53 AM    BASOPCT 0.1 01/18/2023 04:53 AM    MONOSABS 0.6 01/18/2023 04:53 AM    LYMPHSABS 1.0 01/18/2023 04:53 AM    EOSABS 0.0 01/18/2023 04:53 AM    BASOSABS 0.0 01/18/2023 04:53 AM     CMP:    Lab Results   Component Value Date/Time     01/18/2023 04:53 AM    K 4.5 01/18/2023 04:53 AM    K 4.6 01/15/2023 06:26 AM     01/18/2023 04:53 AM    CO2 17 01/18/2023 04:53 AM    BUN 72 01/18/2023 04:53 AM    CREATININE 2.47 01/18/2023 04:53 AM    GFRAA >60.0 06/09/2022 07:15 PM    LABGLOM 25.8 01/18/2023 04:53 AM    GLUCOSE 146 01/18/2023 04:53 AM    PROT 5.9 01/18/2023 04:53 AM    LABALBU 1.9 01/18/2023 04:53 AM    CALCIUM 8.7 01/18/2023 04:53 AM    BILITOT 1.2 01/18/2023 04:53 AM    ALKPHOS 175 01/18/2023 04:53 AM    AST 39 01/18/2023 04:53 AM    ALT 27 01/18/2023 04:53 AM     LDH:  No results found for: LDH  Warfarin PT/INR:  No components found for: PTPATWAR, PTINRWAR  PTT:    Lab Results   Component Value Date/Time    APTT 33.6 01/08/2023 07:27 PM     VITAMIN B12: No components found for: B12  FOLATE:  No results found for: FOLATE  IRON:    Lab Results   Component Value Date/Time    IRON 26 08/16/2013 09:00 AM     Iron Saturation:  No components found for: PERCENTFE  TIBC:    Lab Results   Component Value Date/Time    TIBC 469 08/16/2013 09:00 AM     FERRITIN:    Lab Results   Component Value Date/Time    FERRITIN 6.0 08/16/2013 09:00 AM     Fibrinogen Level:  No components found for: FIB  24 Hour Urine for Protein:  No components found for: Vitaly Kanaris, [id number], UTV3  PSA: No results found for: PSA      RADIOLOGY RESULTS:  CT Head W/O Contrast    Result Date: 1/9/2023  EXAMINATION: CT OF THE HEAD WITHOUT CONTRAST  1/8/2023 7:50 pm TECHNIQUE: CT of the head was performed without the administration of intravenous contrast. Automated exposure control, iterative reconstruction, and/or weight based adjustment of the mA/kV was utilized to reduce the radiation dose to as low as reasonably achievable. COMPARISON: None. HISTORY: ORDERING SYSTEM PROVIDED HISTORY: AMS TECHNOLOGIST PROVIDED HISTORY: Reason for exam:->AMS Has a \"code stroke\" or \"stroke alert\" been called? ->No Decision Support Exception - unselect if not a suspected or confirmed emergency medical condition->Emergency Medical Condition (MA) What reading provider will be dictating this exam?->CRC FINDINGS: BRAIN/VENTRICLES: There is no acute intracranial hemorrhage, mass effect or midline shift. No abnormal extra-axial fluid collection. The gray-white differentiation is maintained without evidence of an acute infarct. There is no evidence of hydrocephalus. ORBITS: The visualized portion of the orbits demonstrate no acute abnormality. SINUSES: The visualized paranasal sinuses and mastoid air cells demonstrate no acute abnormality. SOFT TISSUES/SKULL:  No acute abnormality of the visualized skull or soft tissues. No acute intracranial abnormality. CT ABDOMEN PELVIS W IV CONTRAST Additional Contrast? None    Result Date: 1/10/2023  EXAMINATION: CT OF THE ABDOMEN AND PELVIS WITH CONTRAST 1/10/2023 9:41 am TECHNIQUE: CT of the abdomen and pelvis was performed with the administration of intravenous contrast. Multiplanar reformatted images are provided for review. Automated exposure control, iterative reconstruction, and/or weight based adjustment of the mA/kV was utilized to reduce the radiation dose to as low as reasonably achievable. COMPARISON: CTA chest dated 06/09/2022 HISTORY: ORDERING SYSTEM PROVIDED HISTORY: CIRRHOSIS TECHNOLOGIST PROVIDED HISTORY: Reason for exam:->CIRRHOSIS Additional Contrast?->None What reading provider will be dictating this exam?->CRC FINDINGS: Lower Chest: Lung bases are clear.  Organs: Liver is heterogeneous with multifocal areas of hypoenhancement including a coalescent area occupying much of the right hepatic lobe 14 cm in dimension of indeterminate significance although hepatocellular carcinoma of diffuse involvement not excluded with multifocal other areas scattered throughout. Perihepatic ascites. Gallbladder contains numerous stones in the dependent portion neck of the gallbladder of cholelithiasis without wall thickening. .  Pancreas unremarkable. Spleen enlarged to 14.8 cm in craniocaudal dimension. Adrenals without nodule. Kidneys without suspicious renal lesion and no hydronephrosis. Low-attenuation areas bilateral of renal cortical cysts as well as bilateral renal cortical scarring atrophy. GI/Bowel: No focal thickening or disproportion dilatation of bowel. No inflammatory findings. Pelvis: No suspicious pelvic lesion or bulky pelvic adenopathy/free fluid. Peritoneum/Retroperitoneum: Scattered mildly enlarged retroperitoneal lymph nodes measuring up to a periaortic left 17 mm lymph node. Aortocaval 2 cm lymph node along with 2 cm periportal lymph nodes. Mesenteric edema with moderate volume abdominopelvic ascites. Vasculature: Grossly normal caliber of abdominal aorta and vasculature Bones/Soft Tissues: No acute osseous or soft tissue findings. Fat containing left direct inguinal hernia. Progressive hepatic disease with low-attenuation areas scattered throughout diffusely becoming coalescent or confluent up to 14 cm in the right hepatic lobe occupying much of the right hepatic dome appears grossly progressed from 06/09/2022 CTA chest comparison with partial imaging was performed of the upper abdomen. Underline hepatocellular carcinoma with diffuse involvement not excluded. Perihepatic ascites with sequela portal hypertension including splenomegaly. Periportal and retroperitoneal adenopathy additionally noted.  Moderate ascites     XR CHEST PORTABLE    Result Date: 1/8/2023  EXAMINATION: ONE XRAY VIEW OF THE CHEST 1/8/2023 7:44 pm COMPARISON: None. HISTORY: ORDERING SYSTEM PROVIDED HISTORY: AMS TECHNOLOGIST PROVIDED HISTORY: Reason for exam:->AMS What reading provider will be dictating this exam?->CRC FINDINGS: There is low lung volumes. There is minimal atelectasis in the lung bases with possible developing infiltrates and pleural effusion in the left base. Low lung volumes with atelectasis/possible developing left basilar pneumonia. IR US GUIDED PARACENTESIS    1. Status post technically successful ultrasound-guided paracentesis. Korin Khan is a Male of 76 years age, referred for Ultrasound Guided Paracentesis. PROCEDURE: Survey of the abdomen showed large amount of ascites fluid. After obtaining informed consent, the patient was positioned supine on the sonography table. Using ultrasound, the skin over the left hemiabdomen was locally anesthetized with 1% lidocaine. Following that, a Yueh needle was advanced into the fluid pocket using ultrasound visualization. 4120cc, of clear yellow fluid were aspirated and sent for cytology, and pathology. The needle was removed, and hemostasis was obtained with pressure. A Band-Aid was placed. Post procedure images did not demonstrate hemorrhage at the target site. The patient tolerated the procedure well. The patient left the department in good condition. A radiology nurse was in presence monitoring vital signs, assisting throughout the procedure. IR US GUIDED PARACENTESIS    1. Status post technically successful ultrasound-guided paracentesis. Korin Khan is a Male of 76 years age, referred for Ultrasound Guided Paracentesis. PROCEDURE: Survey of the abdomen showed large amount of ascites fluid. After obtaining informed consent, the patient was positioned supine on the sonography table. Using ultrasound, the skin over the left hemiabdomen was locally anesthetized with 1% lidocaine.   Following that, a Yueh needle was advanced into the fluid pocket using ultrasound visualization. 7850cc, of clear yellow fluid were aspirated and sent for cytology, and pathology. The needle was removed, and hemostasis was obtained with pressure. A Band-Aid was placed. Post procedure images did not demonstrate hemorrhage at the target site. The patient tolerated the procedure well. The patient left the department in good condition. A radiology nurse was in presence monitoring vital signs, assisting throughout the procedure. US DUP ABD PEL RETRO SCROT COMPLETE    Result Date: 1/9/2023  EXAMINATION: DOPPLER EVALUATION OF THE PELVIS 1/9/2023 12:34 pm COMPARISON: None. HISTORY: ORDERING SYSTEM PROVIDED HISTORY: elevated LFTS TECHNOLOGIST PROVIDED HISTORY: Reason for exam:->elevated LFTS What reading provider will be dictating this exam?->CRC FINDINGS: Echogenic liver with nodular contour. Perihepatic ascites. A 1.3 cm cyst is noted in the liver, which appears to be in the right lobe. Color Doppler flow is noted in the main hepatic vein, hepatic artery, and left hepatic vein. The main portal vein and right portal vein demonstrate irregular color Doppler flow on this exam.  The right and left hepatic veins are suboptimally visualized. The splenic vein is suboptimally visualized. The flow in the vessel labeled hepatic artery appears turbulent and high velocity. Cholelithiasis and gallbladder sludge noted. No pericholecystic fluid or obvious gallbladder wall thickening. Wyonia Dial sign was not documented on technologist where she. Suboptimal visualization of the IVC. Four-quadrant ascites. Findings suggestive of cirrhosis. There is moderate to large volume of ascites. Limited visualization of the portal venous system demonstrates color Doppler flow in the left portal vein, but irregular color Doppler flow in the main and right portal veins.   Consider CT of the abdomen/pelvis with IV contrast for further evaluation of the portal venous system. US DUP LOWER EXTREMITIES BILATERAL VENOUS    Result Date: 1/9/2023  EXAMINATION: DUPLEX VENOUS ULTRASOUND OF THE BILATERAL LOWER EXTREMITIES1/9/2023 9:45 am TECHNIQUE: Duplex ultrasound using B-mode/gray scaled imaging, Doppler spectral analysis and color flow Doppler was obtained of the deep venous structures of the lower bilateral extremities. COMPARISON: None. HISTORY: ORDERING SYSTEM PROVIDED HISTORY: edema r/o DVT TECHNOLOGIST PROVIDED HISTORY: Reason for exam:->edema r/o DVT What reading provider will be dictating this exam?->CRC FINDINGS: The visualized veins of the bilateral lower extremities are patent and free of echogenic thrombus. The veins demonstrate good compressibility with normal color flow study and spectral analysis. No evidence of DVT in either lower extremity. ASSESSMENT AND PLAN:      1. Probable metastatic CA- very poor performance status . Poor candidate for chemotx  --family  meeting with hospice program scheduled for tomorrow. 2. Coagulopathy likely due to liver malignancy.  Pt at higher risk for bleeding from biopsy of liver lesion      Electronically signed by Ruffin Ahumada, MD on 1/18/23 at 6:53 PM EST

## 2023-01-18 NOTE — FLOWSHEET NOTE
Patient c/o feet being hot. Socks removed per his wish. Upon removing socks, this RN observed his toes to be very dark purple on bilateral feet. His feet are +3 +4 pitting but a doppled pulse was heard. Attending notified. Patient continues to refuse food and drink. Family remains at bedside.      Electronically signed by Dedrick Fan RN on 1/18/2023 at 12:01 PM

## 2023-01-18 NOTE — PROGRESS NOTES
Hospitalist Daily Progress Note  Name: Deysi Blakely  Age: 76 y.o. Gender: male  CodeStatus: DNR-CCA  Allergies: No Known Allergies    Chief Complaint:Fatigue (Difficulty ambulating.)    Primary Care Provider: [address] [place], [place] [address]corazon  InpatientTreatment Team: Treatment Team: Attending Provider: Milvia Michaud DO; Consulting Physician: Marilee Alcaraz MD; Consulting Physician: Joe Lawler MD; Patient Care Tech: Jacquelyne Gitelman; Registered Nurse: Dilip Moise RN; : Thalia Green RN  Admission Date: 1/8/2023      Subjective: Patient is seen evaluated bedside. Intermittently oriented hospice consulted today after discussion with family and patient unable to get CT biopsy given elevating INR    Physical Exam  Constitutional:       Appearance: He is obese. He is ill-appearing and toxic-appearing. Comments: lethargic   HENT:      Head: Normocephalic and atraumatic. Mouth/Throat:      Mouth: Mucous membranes are dry. Pharynx: Oropharynx is clear. Eyes:      Conjunctiva/sclera: Conjunctivae normal.      Pupils: Pupils are equal, round, and reactive to light. Cardiovascular:      Rate and Rhythm: Normal rate. Heart sounds: No murmur heard. No gallop. Pulmonary:      Breath sounds: No wheezing, rhonchi or rales. Abdominal:      General: There is distension. Tenderness: There is abdominal tenderness. There is no guarding. Musculoskeletal:         General: Swelling present. Right lower leg: Edema present. Left lower leg: Edema present. Neurological:      Comments: Lethargic, groans to stimulation   Psychiatric:         Mood and Affect: Mood normal.         Thought Content: Thought content normal.       Review of Systems  14 point ros is reviewed and negative except for as above.    Medications:  Reviewed    Infusion Medications:    dextrose       Scheduled Medications:    lactulose  20 g Rectal TID cefTRIAXone (ROCEPHIN) IV  1,000 mg IntraVENous Q24H    insulin glargine  40 Units SubCUTAneous Nightly    scopolamine  1 patch TransDERmal Q72H    [Held by provider] furosemide  20 mg Oral BID    sertraline  25 mg Oral Daily    enoxaparin  30 mg SubCUTAneous BID    insulin lispro  0-8 Units SubCUTAneous TID WC    insulin lispro  0-4 Units SubCUTAneous Nightly    spironolactone  50 mg Oral Daily     PRN Meds: [Held by provider] HYDROmorphone, traMADol, ondansetron **OR** ondansetron, polyethylene glycol, glucose, dextrose bolus **OR** dextrose bolus, glucagon (rDNA), dextrose    Labs:   Recent Labs     01/17/23  0505 01/17/23  0959 01/18/23  0453   WBC 8.5  --  9.4   HGB 14.1 15.1 14.8   HCT 43.6  --  46.1     --  166       Recent Labs     01/16/23  0614 01/17/23  0505 01/17/23  0959 01/18/23  0453    141  --  143   K 4.5 4.3  --  4.5    107  --  110*   CO2 20 16*  --  17*   BUN 59* 63*  --  72*   CREATININE 2.02* 2.07* 2.3* 2.47*   CALCIUM 8.0* 8.1*  --  8.7       Recent Labs     01/17/23  0505 01/18/23  0453   AST 36 39   ALT 25 27   BILIDIR 0.8* 0.9*   BILITOT 1.2* 1.2*   ALKPHOS 168* 175*       Recent Labs     01/17/23  0505 01/18/23  0453   INR 2.3 2.7       No results for input(s): Hattie Lowers in the last 72 hours. Urinalysis:   Lab Results   Component Value Date/Time    NITRU Negative 01/17/2023 12:57 PM    WBCUA  01/17/2023 12:57 PM    BACTERIA MANY 01/17/2023 12:57 PM    RBCUA 0-2 01/17/2023 12:57 PM    BLOODU Negative 01/17/2023 12:57 PM    SPECGRAV 1.016 01/17/2023 12:57 PM    GLUCOSEU Negative 01/17/2023 12:57 PM       Radiology:   Most recent    Chest CT      WITH CONTRAST:No results found for this or any previous visit. WITHOUT CONTRAST: No results found for this or any previous visit. CXR      2-view: No results found for this or any previous visit.        Portable: Results for orders placed during the hospital encounter of 01/08/23    XR CHEST PORTABLE    Narrative  EXAMINATION:  ONE XRAY VIEW OF THE CHEST    1/8/2023 7:44 pm    COMPARISON:  None. HISTORY:  ORDERING SYSTEM PROVIDED HISTORY: AMS  TECHNOLOGIST PROVIDED HISTORY:  Reason for exam:->AMS  What reading provider will be dictating this exam?->CRC    FINDINGS:  There is low lung volumes. There is minimal atelectasis in the lung bases  with possible developing infiltrates and pleural effusion in the left base. Impression  Low lung volumes with atelectasis/possible developing left basilar pneumonia. Echo No results found for this or any previous visit. Assessment/Plan:    Active Hospital Problems    Diagnosis Date Noted    Dysphagia [R13.10] 01/18/2023     Priority: Medium    Palliative care encounter [Z51.5] 01/17/2023     Priority: Medium    Advanced care planning/counseling discussion [Z71.89] 01/17/2023     Priority: Medium    Goals of care, counseling/discussion [Z71.89] 01/17/2023     Priority: Medium    Encounter for hospice care discussion [Z71.89] 01/17/2023     Priority: Medium    At high risk for malnutrition [Z91.89] 01/17/2023     Priority: Medium    General weakness [R53.1] 01/09/2023     Priority: Medium    Suicidal ideation [R45.851] 01/08/2023     Priority: Medium     Liver mass with elevated AFP: concern for [address] Unable to biopsy given elevating INR however given family's request for hospice care may not need further evaluation. Awaiting hospice meeting tomorrow. Unable to tolerate oral lactulose for encephalopathy will change to enema    SALOME with concern for hepatorenal: Creatinine roughly stable  Fluids given anasarca avoid nephrotoxic agents     DMII: disContinue Lantus given n.p.o. status/poor intake Humalog sliding scale    Lethargy. CT head negative on admission for mets. Abg, ammonia now. CT head, if negative may need further imaging for brain mets. Bacteriuria: Ceftriaxone for possible UTI    Depression: zoloft.    Severe protein calorie malnutrition: consult dietary. Additional work up or/and treatment plan may be added today or then after based on clinical progression. I am managing a portion of pt care. Some medical issues are handled byother specialists. Additional work up and treatment should be done in out pt setting by pt PCP and other out pt providers. In addition to examining and evaluating pt, I spent additional time explaining care, normaland abnormal findings, and treatment plan. All of pt questions were answered. Counseling, diet and education were provided. Case will be discussed with nursing staff when appropriate. Family will be updated if and whenappropriate.       Electronically signed by Maria Guadalupe Gonzalez DO on 1/18/2023 at 5:18 PM

## 2023-01-18 NOTE — PLAN OF CARE
Problem: Discharge Planning  Goal: Discharge to home or other facility with appropriate resources  Outcome: Progressing  Flowsheets (Taken 1/18/2023 0800)  Discharge to home or other facility with appropriate resources: Identify barriers to discharge with patient and caregiver     Problem: Skin/Tissue Integrity  Goal: Absence of new skin breakdown  Description: 1. Monitor for areas of redness and/or skin breakdown  2. Assess vascular access sites hourly  3. Every 4-6 hours minimum:  Change oxygen saturation probe site  4. Every 4-6 hours:  If on nasal continuous positive airway pressure, respiratory therapy assess nares and determine need for appliance change or resting period.   Outcome: Progressing     Problem: Chronic Conditions and Co-morbidities  Goal: Patient's chronic conditions and co-morbidity symptoms are monitored and maintained or improved  Outcome: Progressing  Flowsheets (Taken 1/18/2023 0800)  Care Plan - Patient's Chronic Conditions and Co-Morbidity Symptoms are Monitored and Maintained or Improved: Monitor and assess patient's chronic conditions and comorbid symptoms for stability, deterioration, or improvement     Problem: Safety - Adult  Goal: Free from fall injury  Outcome: Progressing  Flowsheets (Taken 1/18/2023 1029)  Free From Fall Injury: Instruct family/caregiver on patient safety     Problem: Pain  Goal: Verbalizes/displays adequate comfort level or baseline comfort level  Outcome: Progressing     Problem: ABCDS Injury Assessment  Goal: Absence of physical injury  Outcome: Progressing  Flowsheets (Taken 1/18/2023 1029)  Absence of Physical Injury: Implement safety measures based on patient assessment     Problem: Nutrition Deficit:  Goal: Optimize nutritional status  Outcome: Progressing

## 2023-01-18 NOTE — PROGRESS NOTES
Physician Progress Note      PATIENT:               Osiris Hale  CSN #:                  [id number]  :                       1945  ADMIT DATE:       2023 6:37 PM  [address] [place] DATE:  Nathanael Ileana  PROVIDER #:        Elle Cosby DO          QUERY TEXT:    Pt admitted with ascites with \"Liver mass with elevated AFP: concern for [address] \"   Pt noted to have lethargy and \"Sleepy today\" in Dr. Celi Varela PN. If possible,   please document in the progress notes and discharge summary if you are   evaluating and / or treating any of the following: The medical record reflects the following:  Risk Factors: ascites, cirrhosis, hepatomegaly, SALOME,  Clinical Indicators: Ammponia 81, Alk Phos 289, ALT 36, AST 71, Lactic acid   3.8, glucose , pO2 69; CXR: low lung volumes with atelectasis/possible   developing left bibasilar pneumonia, sCr 1.6/43.5-2.07/31.9; UA: WBC ,   many bacteria, moderate leuks; Lactate 2.05  Treatment: GI, IR & Hematology consults, liver biopsy, Neuro checks, labs and   monitoring    Thank you,  Eduard Tuttle   Clinical Documentation Improvement Specialist  W: [phone]  Options provided:  -- Hepatic encephalopathy acute  -- Hepatic encephalopathy chronic  -- Metabolic encephalopathy  -- Toxic metabolic encephalopathy  -- Other - I will add my own diagnosis  -- Disagree - Not applicable / Not valid  -- Disagree - Clinically unable to determine / Unknown  -- Refer to Clinical Documentation Reviewer    PROVIDER RESPONSE TEXT:    This patient has acute hepatic encephalopathy.     Query created by: Juliet Marion on 2023 1:56 PM      Electronically signed by:  Elle Cosby DO 2023 7:49 PM

## 2023-01-18 NOTE — PROGRESS NOTES
Vascular and Interventional Radiology   Kimberley Moya. Mariam Juarez M.D. [facility]      Chief Complaint:   Chief Complaint   Patient presents with    Fatigue     Difficulty ambulating. Subjective: Filomena Dorado is a 76 y.o. male with liver mass, and elevated AFP, most consistent with [address] Elevated INR, not a safe candidate for liver biopsy. For paracentesis today. Will consider biopsy if we can have INR within 1.3 or less. Objective:   /78   Pulse 94   Temp 97.7 °F (36.5 °C) (Axillary)   Resp 18   Ht 5' 8\" (1.727 m)   Wt 263 lb 11.2 oz (119.6 kg)   SpO2 98%   BMI 40.10 kg/m²     Physical:   not in acute distress    Normocephalic, without obvious abnormality, atraumatic    Neck supple. No adenopathy. Thyroid symmetric, normal size, and without nodularity    normal rate and regular rhythm    chest clear, no wheezing, rales, normal symmetric air entry    Abdomen: Distended    Lab:  Recent Labs     01/18/23  0453   WBC 9.4   RBC 5.04   HGB 14.8   HCT 46.1   MCV 91.4   MCH 29.3   MCHC 32.0*   RDW 20.0*          Recent Labs     01/17/23  0505 01/18/23  0453   INR 2.3 2.7   PROTIME 25.8* 29.0*       Recent Labs     01/16/23  0614 01/17/23  0505 01/17/23  0959 01/18/23  0453   CREATININE 2.02* 2.07* 2.3* 2.47*       Assessment: Filomena Dorado is a 76 y.o. male with elevated AFP and liver mass and ascites, most consistent with HCC. Has elevated INR. Plan: Ultrasound guided biopsy. Will need to have INR at 1.3 or below for safer liver biopsy to avoid bleeding. Kimberley Moya.  Mariam Juarez M.D. [facility]

## 2023-01-18 NOTE — PROGRESS NOTES
Physical Therapy Med Surg Daily Treatment Note  Facility/Department: [facility]  [facility]       NAME: Cee Littlellor  : 1945 (83 y.o.)  MRN: [id number]  CODE STATUS: Full Code    Date of Service: 2023    Patient Diagnosis(es): Suicidal ideation [R45.851]  Lactic acidosis [E87.20]  General weakness [R53.1]  Elevated troponin [R77.8]  SALOME (acute kidney injury) ([address] [N17.9]  Current severe episode of major depressive disorder without psychotic features without prior episode [facility]) [F32.2]   Chief Complaint   Patient presents with    Fatigue     Difficulty ambulating. Patient Active Problem List    Diagnosis Date Noted    Palliative care encounter 2023    Advanced care planning/counseling discussion 2023    Goals of care, counseling/discussion 2023    Encounter for hospice care discussion 2023    At high risk for malnutrition 2023    General weakness 2023    Suicidal ideation 2023    Hyperosmolar non-ketotic state due to type 2 diabetes mellitus ([address] 10/05/2020    DKA (diabetic ketoacidosis) ([address] 10/05/2020    Type 2 diabetes mellitus with hyperglycemia ([address] 10/05/2020    SALOME (acute kidney injury) ([address] 10/05/2020    Type 2 diabetes mellitus without complications ([address]         Past Medical History:   Diagnosis Date    Diabetes mellitus ([address]      Past Surgical History:   Procedure Laterality Date    PARACENTESIS Left 01/10/2023    7850 ml removed by Dr. Mandy Polk - therapeutic & diagnostic    PARACENTESIS Left 2023    4920 ml removed by Dr Ezio qureshi. SUBJECTIVE:   Subjective: \"I can't do it\"    Pain  Pain: reports it will hurt too much to move. OBJECTIVE:   Orientation  Overall Orientation Status: Within Functional Limits  Cognition  Overall Cognitive Status: Exceptions  Following Commands:  Follows one step commands with repetition  Attention Span: Difficulty attending to directions  Insights: Decreased awareness of deficits    Bed Mobility Training  Bed Mobility Training: Yes  Overall Level of Assistance: Assist X2;Total assistance  Interventions: Safety awareness training; Tactile cues  Rolling: Maximum assistance  Supine to Sit:  (refused)    Transfer Training  Transfer Training: No                                              ASSESSMENT sister present who just flew in from [place] to see pt. Pt adamantly refused to move or be moved at this time. Reports he gets up all the time which is not the case. Pt has not worked with therapy since 1/12 when he sat up for approximately 30 seconds. Encouraged pt to sit up so we could check his skin for changes. Positioned pt in long sitting to check his skin. Pt only tolerated approximately 30 seconds. This was performed 2 x. Pt then rolled from right to left with max assist. Pt able to participate in aarom lower extremity exercises while supine. Pt closed his eyes often during session and was adamant he did not want to be moved at all. Discharge Recommendations:  Continue to assess pending progress         Goals  Short Term Goals  Short Term Goal 1: Pt will demonstrate rolling mod A +1  Long Term Goals  Long Term Goal 1: Pt will demonstrate bed mobility max A +1  Long Term Goal 2: Pt will demonstrate sitting edge of bed with SBA >/= 10 min to prepare for OOB activity  Long Term Goal 3: Pt will demonstrate transfers max A +2 with safest AD  Long Term Goal 4: Pt will demonstrate static standing with safest AD mod A +1 >/= 30 sec    PLAN    General Plan: 1 time a day 3-6 times a week        [facility] (6 CLICK) BASIC MOBILITY  AM-PAC Inpatient Mobility Raw Score : 7     Therapy Time   Individual   Time In  0929   Time out 0944   Minutes 15   5 minutes therex  10 minutes bed mobility.         Jeremiah Fontana PTA, 01/18/23 at 10:39 AM         Definitions for assistance levels  Independent = pt does not require any physical supervision or assistance from another person for activity completion. Device may be needed.   Stand by assistance = pt requires verbal cues or instructions from another person, close to but not touching, to perform the activity  Minimal assistance= pt performs 75% or more of the activity; assistance is required to complete the activity  Moderate assistance= pt performs 50% of the activity; assistance is required to complete the activity  Maximal assistance = pt performs 25% of the activity; assistance is required to complete the activity  Dependent = pt requires total physical assistance to accomplish the task

## 2023-01-18 NOTE — PROGRESS NOTES
[address] [place] [address] is scheduled to meet with Sonny Lion on 1/19/2023 at 0815 for hospice referral meeting.

## 2023-01-18 NOTE — CONSULTS
Spiritual Care Services     Summary of Visit:  Pt was consulted concerning HCPOA, pt has a child who lives in [place] and a sister who is currently with him from [place], Sister Ghazal Salmon stated that her brother has a Last Will and Testimony; Pt sister was going to bring his Lwt to add to the pt Medical records, Prayed with the pt and family before leaving the room,         Encounter Summary  Encounter Overview/Reason : Advance Care Planning, Spiritual/Emotional Needs  Service Provided For[de-identified] Patient and family together  Referral/Consult From[de-identified] Nurse  Support System: Children, Family members  Complexity of Encounter: Moderate  Begin Time: 1420  End Time : 1435  Total Time Calculated: 15 min  Encounter   Type: Initial Screen/Assessment     Spiritual/Emotional needs  Type: Spiritual Support     Grief, Loss, and Adjustments  Type: End of Life, Hospice           Advance Care Planning  Type:  (Pt not alert and oriented at this time)    Spiritual Assessment/Intervention/Outcomes:    Assessment: Complicated grieving    Intervention: Active listening, Prayer (assurance of)/[place], Sustaining Presence/Ministry of presence    Outcome: Receptive      Care Plan:    Plan and Referrals  Plan/Referrals: Continue to visit, (comment)          Spiritual Care Services   Electronically signed by Chaplain Mayra on 1/18/2023 at 2:48 PM.    To reach a  for emotional and spiritual support, place an [facility] consult request.   If a  is needed immediately, dial 0 and ask to page the on-call .

## 2023-01-19 LAB
ALBUMIN SERPL-MCNC: 1.9 G/DL (ref 3.5–4.6)
ALP BLD-CCNC: 195 U/L (ref 35–104)
ALT SERPL-CCNC: 31 U/L (ref 0–41)
ANION GAP SERPL CALCULATED.3IONS-SCNC: 17 MEQ/L (ref 9–15)
AST SERPL-CCNC: 44 U/L (ref 0–40)
BASOPHILS ABSOLUTE: 0 K/UL (ref 0–0.2)
BASOPHILS RELATIVE PERCENT: 0.2 %
BILIRUB SERPL-MCNC: 1.1 MG/DL (ref 0.2–0.7)
BILIRUBIN DIRECT: 0.8 MG/DL (ref 0–0.4)
BILIRUBIN, INDIRECT: 0.3 MG/DL (ref 0–0.6)
BUN BLDV-MCNC: 82 MG/DL (ref 8–23)
CALCIUM SERPL-MCNC: 8.8 MG/DL (ref 8.5–9.9)
CHLORIDE BLD-SCNC: 111 MEQ/L (ref 95–107)
CO2: 17 MEQ/L (ref 20–31)
CREAT SERPL-MCNC: 2.61 MG/DL (ref 0.7–1.2)
EOSINOPHILS ABSOLUTE: 0 K/UL (ref 0–0.7)
EOSINOPHILS RELATIVE PERCENT: 0.1 %
GFR SERPL CREATININE-BSD FRML MDRD: 24.2 ML/MIN/{1.73_M2}
GLUCOSE BLD-MCNC: 112 MG/DL (ref 70–99)
GLUCOSE BLD-MCNC: 139 MG/DL (ref 70–99)
HCT VFR BLD CALC: 47.2 % (ref 42–52)
HEMOGLOBIN: 15.5 G/DL (ref 14–18)
INR BLD: 2.9
LYMPHOCYTES ABSOLUTE: 0.9 K/UL (ref 1–4.8)
LYMPHOCYTES RELATIVE PERCENT: 13.7 %
MCH RBC QN AUTO: 30 PG (ref 27–31.3)
MCHC RBC AUTO-ENTMCNC: 32.7 % (ref 33–37)
MCV RBC AUTO: 91.7 FL (ref 79–92.2)
MONOCYTES ABSOLUTE: 0.6 K/UL (ref 0.2–0.8)
MONOCYTES RELATIVE PERCENT: 8.7 %
NEUTROPHILS ABSOLUTE: 5.2 K/UL (ref 1.4–6.5)
NEUTROPHILS RELATIVE PERCENT: 77.3 %
PDW BLD-RTO: 20.9 % (ref 11.5–14.5)
PERFORMED ON: ABNORMAL
PLATELET # BLD: 143 K/UL (ref 130–400)
POTASSIUM SERPL-SCNC: 4.5 MEQ/L (ref 3.4–4.9)
PROTHROMBIN TIME: 30.7 SEC (ref 12.3–14.9)
RBC # BLD: 5.15 M/UL (ref 4.7–6.1)
SARS-COV-2, NAAT: NOT DETECTED
SODIUM BLD-SCNC: 145 MEQ/L (ref 135–144)
TOTAL PROTEIN: 6.1 G/DL (ref 6.3–8)
WBC # BLD: 6.7 K/UL (ref 4.8–10.8)

## 2023-01-19 PROCEDURE — 97535 SELF CARE MNGMENT TRAINING: CPT

## 2023-01-19 PROCEDURE — 36415 COLL VENOUS BLD VENIPUNCTURE: CPT

## 2023-01-19 PROCEDURE — 80076 HEPATIC FUNCTION PANEL: CPT

## 2023-01-19 PROCEDURE — 85025 COMPLETE CBC W/AUTO DIFF WBC: CPT

## 2023-01-19 PROCEDURE — 97110 THERAPEUTIC EXERCISES: CPT

## 2023-01-19 PROCEDURE — 87635 SARS-COV-2 COVID-19 AMP PRB: CPT

## 2023-01-19 PROCEDURE — 85610 PROTHROMBIN TIME: CPT

## 2023-01-19 PROCEDURE — 80048 BASIC METABOLIC PNL TOTAL CA: CPT

## 2023-01-19 PROCEDURE — 6360000002 HC RX W HCPCS: Performed by: INTERNAL MEDICINE

## 2023-01-19 PROCEDURE — 6370000000 HC RX 637 (ALT 250 FOR IP): Performed by: INTERNAL MEDICINE

## 2023-01-19 PROCEDURE — 2060000000 HC ICU INTERMEDIATE R&B

## 2023-01-19 RX ORDER — MORPHINE SULFATE 20 MG/ML
5 SOLUTION ORAL EVERY 4 HOURS PRN
Status: DISCONTINUED | OUTPATIENT
Start: 2023-01-19 | End: 2023-01-20 | Stop reason: HOSPADM

## 2023-01-19 RX ADMIN — HYDROMORPHONE HYDROCHLORIDE 0.25 MG: 1 INJECTION, SOLUTION INTRAMUSCULAR; INTRAVENOUS; SUBCUTANEOUS at 14:14

## 2023-01-19 RX ADMIN — Medication 1000 ML: at 15:40

## 2023-01-19 NOTE — PROGRESS NOTES
Comprehensive Nutrition Assessment    Type and Reason for Visit:  Reassess    Nutrition Recommendations/Plan:   Continue current plan of care  Further recommendations dependent on goals of care     Malnutrition Assessment:  Malnutrition Status: At risk for malnutrition (Comment) (01/16/23 9973)    Context:  Social/Environmental Circumstances     Findings of the 6 clinical characteristics of malnutrition:  Energy Intake:  50% or less estimated energy requirements for 1 month or longer  Weight Loss:  Unable to assess     Body Fat Loss:  No significant body fat loss     Muscle Mass Loss:  No significant muscle mass loss    Fluid Accumulation:  Unable to assess     Strength:  Not Performed    Nutrition Assessment:    No progress towards nutrition related goals, newly diagnosed metastatic disease with change in code status and hospice referral . Continue to provide po diet as tolerated with oral nutritional supplements to aid in meeting energy/protein needs, unable to assess for weight changes due to significant edema    Nutrition Related Findings:    Pt presents with SI. CT of the abdomen showed liver lesion probably malignant. Biopsy pending for tomorrow. Pmhx: DM. Pt reports no PO intake x 5 weeks as a suicide attempt. Last BM 1/15. Labs (1/16): BUN/Cr=59/2.02; Gluc=. Elevated LFTs. NS @100ml/hr. Meds reviewed. Continues with poor PO/appetite. +2BLE edema noted. Wound Type: None       Current Nutrition Intake & Therapies:    Average Meal Intake: 1-25%, 0%  Average Supplements Intake: 0%, 1-25%  ADULT DIET; Regular; 5 carb choices (75 gm/meal); Low Sodium (2 gm);  Safety Tray; Safety Tray (Disposables)  ADULT ORAL NUTRITION SUPPLEMENT; Breakfast, Lunch, Dinner; Diabetic Oral Supplement    Anthropometric Measures:  Height: 5' 8\" (172.7 cm)  Ideal Body Weight (IBW): 154 lbs (70 kg)    Admission Body Weight: 268 lb (121.6 kg) (1/8 stated)  Current Body Weight: 263 lb (119.3 kg) (* edema present),   UTD ( edema) IBW. Weight Source: Stated  Current BMI (kg/m2): 40  Usual Body Weight: 231 lb 3 oz (104.9 kg) (bed 2020)  % Weight Change (Calculated): 15.9                    BMI Categories: Obese Class 3 (BMI 40.0 or greater)    Estimated Daily Nutrient Needs:  Energy Requirements Based On: Kcal/kg  Weight Used for Energy Requirements: Current  Energy (kcal/day): 1824-2000kcal (15-17kcal/kg)  Weight Used for Protein Requirements: Ideal  Protein (g/day): 119-140g (1.7-2g/kg)  Method Used for Fluid Requirements: 1 ml/kcal  Fluid (ml/day): ~2000ml    Nutrition Diagnosis:   Inadequate oral intake related to psychological cause or life stress as evidenced by poor intake prior to admission, intake 0-25%       Nutrition Interventions:   Food and/or Nutrient Delivery: Continue Current Diet, Continue Oral Nutrition Supplement  Nutrition Education/Counseling: Education declined  Coordination of Nutrition Care: Continue to monitor while inpatient       Goals:  Previous Goal Met: No Progress toward Goal(s)  Goals: PO intake 75% or greater, other (specify)  Specify Other Goals: honor goals of care    Nutrition Monitoring and Evaluation:   Behavioral-Environmental Outcomes: None Identified  Food/Nutrient Intake Outcomes: Food and Nutrient Intake, Supplement Intake  Physical Signs/Symptoms Outcomes: Biochemical Data, Weight, Meal Time Behavior    Discharge Planning:    No discharge needs at this time     Star Farooq RD, LD

## 2023-01-19 NOTE — PLAN OF CARE
Problem: Nutrition Deficit:  Goal: Optimize nutritional status  Outcome: Not Progressing  Flowsheets (Taken 1/19/2023 1250)  Nutrient intake appropriate for improving, restoring, or maintaining nutritional needs:   Assess nutritional status and recommend course of action   Monitor oral intake, labs, and treatment plans   Recommend appropriate diets, oral nutritional supplements, and vitamin/mineral supplements

## 2023-01-19 NOTE — PROGRESS NOTES
Nursing report received from first shift nurse. Pt in bed resting. Pt Currently denies pain. Bed in lowest position. Side rails x2, call light in reach.  Nurse will monitor Pt throughout the night     2100 Pt was given lactulose in sterile water enema

## 2023-01-19 NOTE — PROGRESS NOTES
was requested concerning HCPOA, pt at this time does not have a HCPOA in place, sister of pt is trying to find the pt Last Will and Testimony, pt not able to complete HCPOA,   found a conference room for pt sister to meet hospice, Hospice met with pt sister and had a conference call with the pt sister during the meeting, provided ministry of presence

## 2023-01-19 NOTE — PROGRESS NOTES
Writer attempted to schedule referral meeting with patients spouse Yina Reid. Yina Reid was very upset that [address] [place] [address] 37 contacted her. She stated that she spoke with the physician at the hospital and informed them she has all the help she needs at home. Yina Reid stated her daughter-in-law is a geriatric provider and can ensure patient will have medications needed and will be providing care to patient at home.

## 2023-01-19 NOTE — CARE COORDINATION
LILY, [address] [place] [address] 37 MET WITH FAMILY, NO CONSENTS SIGNED AS OF YET D/T FAMILY COMING IN FROM OUT OF TOWN. [facility] NOTIFIED OF ADMIT. CALLED [facility] AND SPOKE WITH NIKHIL RANGEL, UPDATED ON CONDITION AND NEED FOR LONG TERM FACILITY BENEFIT FOR HOSPICE. [facility] F[facility] OUT [facility]. POSSIBLE PLAN FOR [facility] AND THEN TO [facility] IF NEEDED. AWAITING FAMILY.

## 2023-01-19 NOTE — CONSULTS
Late charting    This RN met with pt's sister Yinka Stewart and had pt's  daughter /next of kin Kenyetta Horner on speaker phone for hospice referral meeting. Family was notified that pt was hospice approriate. Pt's family state that they can not stay to care for pt as they both work and will need to return home. Per sister, pt and family do not have finances to cover LTCF and unsure if he would qualify for [address] [facility]. Pt is a connected [place]. Kenyetta Horner stated she was not ready to make a decision today and that she will speak with her aunt and will make decision after arrival on Friday. Pt appropriate for hospice care-Routine level of care. Tylor Cruz, manager of [address] [place] [address] contacted and gave permission that with daughter's consent pt could be transferred to [facility] routine care and that up front payment would be waived at this time. Family would need to agree to move forward with LTC placement with [facility] approval.     TC was made to daughter Kenyetta Horner at 1150 to offer this as an option. Kenyetta Horner indicated she was not free to speak but would call [address] [place] [address] back when en route to [facility].

## 2023-01-19 NOTE — PROGRESS NOTES
Physical Therapy Med Surg Daily Treatment Note  Facility/Department: [facility]  [id number]       NAME: Nemo Pavon  : 1945 (1 y.o.)  MRN: [id number]  CODE STATUS: DNR-CCA    Date of Service: 2023    Patient Diagnosis(es): Suicidal ideation [R45.851]  Lactic acidosis [E87.20]  General weakness [R53.1]  Elevated troponin [R77.8]  SALOME (acute kidney injury) ([address] [N17.9]  Current severe episode of major depressive disorder without psychotic features without prior episode [facility]) [F32.2]   Chief Complaint   Patient presents with    Fatigue     Difficulty ambulating.      Patient Active Problem List    Diagnosis Date Noted    Dysphagia 2023    Palliative care encounter 2023    Advanced care planning/counseling discussion 2023    Goals of care, counseling/discussion 2023    Encounter for hospice care discussion 2023    At high risk for malnutrition 2023    General weakness 2023    Suicidal ideation 2023    Hyperosmolar non-ketotic state due to type 2 diabetes mellitus ([address] 10/05/2020    DKA (diabetic ketoacidosis) ([address] 10/05/2020    Type 2 diabetes mellitus with hyperglycemia ([address] 10/05/2020    SALOME (acute kidney injury) ([address] 10/05/2020    Type 2 diabetes mellitus without complications ([address]         Past Medical History:   Diagnosis Date    Diabetes mellitus ([address]      Past Surgical History:   Procedure Laterality Date    PARACENTESIS Left 01/10/2023    7850 ml removed by Dr. Idalmis Judge - therapeutic & diagnostic    PARACENTESIS Left 2023    4920 ml removed by Dr Idalmis Judge       Chart Reviewed: Yes    Restrictions:  Restrictions/Precautions: Fall Risk  Position Activity Restriction  Other position/activity restrictions: 1:1 suicide precautions    SUBJECTIVE:   Subjective: I can't do it    Pain  Pain: denies pain but yells out in pain with mobility    OBJECTIVE:        Bed mobility  Rolling to Left: Maximum assistance;2 Person assistance (x2)  Rolling to Right: Maximum assistance;2 Person assistance (x2)  Bed Mobility Comments: HOB slightly elevated, pt unable to assist with bed mobility this session. Pt given vc's for technique and hand over hand assist but pt yelling out in pain and resisting attempts at rolling to complete pericare. PT Exercises  Exercise Treatment: supine AP/HS/QS x10 BLE AAROM  A/AROM Exercises: supine MRE's knee ext x 10 BLE          Activity Tolerance  Activity Tolerance: Patient limited by endurance; Patient limited by fatigue          ASSESSMENT   Assessment: Pt requires increased time and effort for all therapy. Pt resists movement when attempting to assist pt with mobility, and requires constant encouragement to participate. Not appropriate for OOB activities at this time. Discharge Recommendations:  Continue to assess pending progress         Goals  Short Term Goals  Short Term Goal 1: Pt will demonstrate rolling mod A +1  Long Term Goals  Long Term Goal 1: Pt will demonstrate bed mobility max A +1  Long Term Goal 2: Pt will demonstrate sitting edge of bed with SBA >/= 10 min to prepare for OOB activity  Long Term Goal 3: Pt will demonstrate transfers max A +2 with safest AD  Long Term Goal 4: Pt will demonstrate static standing with safest AD mod A +1 >/= 30 sec    PLAN    General Plan: 1 time a day 3-6 times a week  Safety Devices  Type of Devices: All fall risk precautions in place, Bed alarm in place, Call light within reach, Left in bed, Nurse notified     [facility] ([address] [place] [address]) [address] Mobility Raw Score : 7     Therapy Time   Individual   Time In 1017   Time Out 1040   Minutes 23      BM/[id number]  Therex- [address], PTA, 01/19/23 at 11:11 AM         Definitions for assistance levels  Independent = pt does not require any physical supervision or assistance from another person for activity completion. Device may be needed.   Stand by assistance = pt requires verbal cues or instructions from another person, close to but not touching, to perform the activity  Minimal assistance= pt performs 75% or more of the activity; assistance is required to complete the activity  Moderate assistance= pt performs 50% of the activity; assistance is required to complete the activity  Maximal assistance = pt performs 25% of the activity; assistance is required to complete the activity  Dependent = pt requires total physical assistance to accomplish the task

## 2023-01-19 NOTE — PROGRESS NOTES
Hospitalist Daily Progress Note  Name: Stan Breaux  Age: 76 y.o. Gender: male  CodeStatus: DNR-CCA  Allergies: No Known Allergies    Chief Complaint:Fatigue (Difficulty ambulating.)    Primary Care Provider: [address] [place], [place] [address]corazon  InpatientTreatment Team: Treatment Team: Attending Provider: Nubia Bal DO; Consulting Physician: Lisa Martínez MD; Consulting Physician: Duane Dailey MD; Patient Care Tech: Michael Jackson; : Ashanti Perry, RN; Registered Nurse: Mai Baker RN; Utilization Reviewer: Yecenia Hernandez RN  Admission Date: 1/8/2023      Subjective: Patient is seen evaluated bedside. Remains lethargic, but is answering yes and no intermittently. Awaiting family to signs paperwork for hospice, meeting tonight. But pt requested hospice care and verbally agreed to hospice care on meeting yesterday. Physical Exam  Constitutional:       Appearance: He is obese. He is ill-appearing and toxic-appearing. Comments: lethargic   HENT:      Head: Normocephalic and atraumatic. Mouth/Throat:      Mouth: Mucous membranes are dry. Pharynx: Oropharynx is clear. Eyes:      Conjunctiva/sclera: Conjunctivae normal.      Pupils: Pupils are equal, round, and reactive to light. Cardiovascular:      Rate and Rhythm: Normal rate. Heart sounds: No murmur heard. No gallop. Pulmonary:      Breath sounds: No wheezing, rhonchi or rales. Abdominal:      General: There is distension. Tenderness: There is abdominal tenderness. There is no guarding. Musculoskeletal:         General: Swelling present. Right lower leg: Edema present. Left lower leg: Edema present. Neurological:      Comments: Lethargic, groans to stimulation   Psychiatric:         Mood and Affect: Mood normal.         Thought Content: Thought content normal.       Review of Systems  14 point ros is reviewed and negative except for as above. Medications:  Reviewed    Infusion Medications:    dextrose       Scheduled Medications:    lactulose  1 enema Rectal TID    cefTRIAXone (ROCEPHIN) IV  1,000 mg IntraVENous Q24H    [Held by provider] insulin glargine  40 Units SubCUTAneous Nightly    scopolamine  1 patch TransDERmal Q72H    [Held by provider] furosemide  20 mg Oral BID    sertraline  25 mg Oral Daily    enoxaparin  30 mg SubCUTAneous BID    insulin lispro  0-8 Units SubCUTAneous TID WC    insulin lispro  0-4 Units SubCUTAneous Nightly    spironolactone  50 mg Oral Daily     PRN Meds: HYDROmorphone, traMADol, ondansetron **OR** ondansetron, polyethylene glycol, glucose, dextrose bolus **OR** dextrose bolus, glucagon (rDNA), dextrose    Labs:   Recent Labs     01/17/23  0505 01/17/23  0959 01/18/23 0453 01/19/23  0624   WBC 8.5  --  9.4 6.7   HGB 14.1 15.1 14.8 15.5   HCT 43.6  --  46.1 47.2     --  166 143       Recent Labs     01/17/23  0505 01/17/23  0959 01/18/23 0453 01/19/23  0624     --  143 145*   K 4.3  --  4.5 4.5     --  110* 111*   CO2 16*  --  17* 17*   BUN 63*  --  72* 82*   CREATININE 2.07* 2.3* 2.47* 2.61*   CALCIUM 8.1*  --  8.7 8.8       Recent Labs     01/17/23  0505 01/18/23 0453 01/19/23  0624   AST 36 39 44*   ALT 25 27 31   BILIDIR 0.8* 0.9* 0.8*   BILITOT 1.2* 1.2* 1.1*   ALKPHOS 168* 175* 195*       Recent Labs     01/17/23  0505 01/18/23 0453 01/19/23  0624   INR 2.3 2.7 2.9       No results for input(s): Leslee Tabares in the last 72 hours. Urinalysis:   Lab Results   Component Value Date/Time    NITRU Negative 01/17/2023 12:57 PM    WBCUA  01/17/2023 12:57 PM    BACTERIA MANY 01/17/2023 12:57 PM    RBCUA 0-2 01/17/2023 12:57 PM    BLOODU Negative 01/17/2023 12:57 PM    SPECGRAV 1.016 01/17/2023 12:57 PM    GLUCOSEU Negative 01/17/2023 12:57 PM       Radiology:   Most recent    Chest CT      WITH CONTRAST:No results found for this or any previous visit.        WITHOUT CONTRAST: No results found for this or any previous visit. CXR      2-view: No results found for this or any previous visit. Portable: Results for orders placed during the hospital encounter of 01/08/23    XR CHEST PORTABLE    Narrative  EXAMINATION:  ONE XRAY VIEW OF THE CHEST    1/8/2023 7:44 pm    COMPARISON:  None. HISTORY:  ORDERING SYSTEM PROVIDED HISTORY: AMS  TECHNOLOGIST PROVIDED HISTORY:  Reason for exam:->AMS  What reading provider will be dictating this exam?->CRC    FINDINGS:  There is low lung volumes. There is minimal atelectasis in the lung bases  with possible developing infiltrates and pleural effusion in the left base. Impression  Low lung volumes with atelectasis/possible developing left basilar pneumonia. Echo No results found for this or any previous visit. Assessment/Plan:    Active Hospital Problems    Diagnosis Date Noted    Dysphagia [R13.10] 01/18/2023     Priority: Medium    Palliative care encounter [Z51.5] 01/17/2023     Priority: Medium    Advanced care planning/counseling discussion [Z71.89] 01/17/2023     Priority: Medium    Goals of care, counseling/discussion [Z71.89] 01/17/2023     Priority: Medium    Encounter for hospice care discussion [Z71.89] 01/17/2023     Priority: Medium    At high risk for malnutrition [Z91.89] 01/17/2023     Priority: Medium    General weakness [R53.1] 01/09/2023     Priority: Medium    Suicidal ideation [R45.851] 01/08/2023     Priority: Medium     Liver mass with elevated AFP: concern for [address] Unable to biopsy given elevating INR family meeting for hospice again tonight. SALOME with concern for hepatorenal: Creatinine roughly stable  D/c Fluids given anasarca avoid nephrotoxic agents     DMII: disContinue Lantus given n.p.o. status/poor intake Humalog sliding scale    Lethargy. Likely multifactorial, lactulose enema titrate to 3bm daily. Bacteriuria: Ceftriaxone for possible UTI D 4    Depression: zoloft.    Severe protein calorie malnutrition: consult dietary. Additional work up or/and treatment plan may be added today or then after based on clinical progression. I am managing a portion of pt care. Some medical issues are handled byother specialists. Additional work up and treatment should be done in out pt setting by pt PCP and other out pt providers. In addition to examining and evaluating pt, I spent additional time explaining care, normaland abnormal findings, and treatment plan. All of pt questions were answered. Counseling, diet and education were provided. Case will be discussed with nursing staff when appropriate. Family will be updated if and whenappropriate. After discussion with pt he started decompensating with rectal bleeding and diffuse seeping preventing IV analgesia. Pt made NPO after discussion with daughter and sister over the phone, morphine added for pain. Family aware of risks of further decompensation from using morphin with poor clearance potential given liver and renal injury.      Electronically signed by Kami Nguyễn DO on 1/19/2023 at 1:53 PM

## 2023-01-20 VITALS
SYSTOLIC BLOOD PRESSURE: 100 MMHG | HEIGHT: 68 IN | RESPIRATION RATE: 18 BRPM | WEIGHT: 263.7 LBS | BODY MASS INDEX: 39.96 KG/M2 | TEMPERATURE: 97.3 F | DIASTOLIC BLOOD PRESSURE: 82 MMHG | OXYGEN SATURATION: 97 % | HEART RATE: 84 BPM

## 2023-01-20 PROBLEM — C22.0 HEPATOCELLULAR CARCINOMA (HCC): Status: ACTIVE | Noted: 2023-01-20

## 2023-01-20 PROCEDURE — 99232 SBSQ HOSP IP/OBS MODERATE 35: CPT | Performed by: NURSE PRACTITIONER

## 2023-01-20 PROCEDURE — 6370000000 HC RX 637 (ALT 250 FOR IP): Performed by: INTERNAL MEDICINE

## 2023-01-20 RX ORDER — MORPHINE SULFATE 20 MG/ML
5 SOLUTION ORAL EVERY 4 HOURS PRN
OUTPATIENT
Start: 2023-01-20

## 2023-01-20 RX ORDER — SERTRALINE HYDROCHLORIDE 25 MG/1
25 TABLET, FILM COATED ORAL DAILY
OUTPATIENT
Start: 2023-01-21

## 2023-01-20 RX ORDER — ENOXAPARIN SODIUM 100 MG/ML
30 INJECTION SUBCUTANEOUS DAILY
Status: DISCONTINUED | OUTPATIENT
Start: 2023-01-21 | End: 2023-01-20 | Stop reason: HOSPADM

## 2023-01-20 RX ORDER — TRAMADOL HYDROCHLORIDE 50 MG/1
25 TABLET ORAL EVERY 6 HOURS PRN
OUTPATIENT
Start: 2023-01-20

## 2023-01-20 RX ORDER — SCOLOPAMINE TRANSDERMAL SYSTEM 1 MG/1
1 PATCH, EXTENDED RELEASE TRANSDERMAL
OUTPATIENT
Start: 2023-01-21

## 2023-01-20 RX ADMIN — Medication 5 MG: at 11:26

## 2023-01-20 RX ADMIN — Medication 5 MG: at 16:03

## 2023-01-20 ASSESSMENT — ENCOUNTER SYMPTOMS
VOMITING: 0
SHORTNESS OF BREATH: 0
EYES NEGATIVE: 1
BACK PAIN: 0
NAUSEA: 0
RESPIRATORY NEGATIVE: 1
ABDOMINAL DISTENTION: 0
TROUBLE SWALLOWING: 0
SORE THROAT: 0
DIARRHEA: 0
COLOR CHANGE: 0
COUGH: 0
WHEEZING: 0
ABDOMINAL PAIN: 0

## 2023-01-20 ASSESSMENT — PAIN SCALES - GENERAL: PAINLEVEL_OUTOF10: 9

## 2023-01-20 NOTE — PROGRESS NOTES
[facility] Occupational Therapy      Date: 2023  Patient Name: Brody Islas        MRN: [id number]  Account: [de-identified]   : 1945  (76 y.o.)  Room: [place] [id number]    Chart reviewed, attempted OT at [phone]. Patient not seen 2° to:    Pt in bed sleeping soundly upon arrival. Pt's family at bedside. Per pt's family, pt only recently fell asleep. Family requests to hold therapy at this time to let pt rest.     Per RN, family considering Hospice at this time. Will attempt again when able.     Electronically signed by BERNABE Ring on 2023 at 9:27 AM

## 2023-01-20 NOTE — PROGRESS NOTES
Patient has chosen more quality of life measures. Patient and family had a hospice meeting and awaiting family arrival and decision for hospice. Palliative care will follow up next week if needed after hospice decision. Palliative care is available for questions or concerns if they should arise.     Electronically signed by BELKIS Cobb CNP on 1/20/2023 at 1:44 PM

## 2023-01-20 NOTE — PROGRESS NOTES
Physical Therapy Missed Treatment   Facility/Department: [facility] MED SURG [id number]/[id number]    NAME: Santiago Golden    : 1945 (71 y.o.)  MRN: [id number]    Account: [de-identified]  Gender: male    Chart reviewed, attempted PT at 13:12. Patient unavailable 2° to:        [x] Pt's family declined PT this date due to family members here visiting before pt leaves for hospice. Family stated at 14:30 they are singing the papers and transporting the pt to hospice. Will attempt PT treatment again at earliest convenience.       Electronically signed by Latha Cantu PTA on 23 at 1:14 PM EST

## 2023-01-20 NOTE — CARE COORDINATION
MET WITH DR VELÁZQUEZ, PTS DTR AND GR. SON, GOC AND HOSPICE EXPLAINED. FAMILY IS WILLING TO MEET WITH HOSPICE FOR CONSENTS AT [facility]. FAMILY ALSO AWARE AFTER [facility] THEN PT WOULD NEED ECF AT [facility]EN[facility]. ONCE HOSPICE CONSENTS SIGNED THEY WILL NEED SENT TO [facility].  REFERRAL SENT TO [facility] IF ECF NEEDED.

## 2023-01-20 NOTE — PROGRESS NOTES
Vascular Medicine and Interventional Radiology:    PROGRESS NOTE:       Cece Gallo  : 1945  MR #: [id number]       PCP:  John Lee     Attending Physician: Pema Still DO     Date of Admission: 2023  6:37 PM     Chief Complaint:   Chief Complaint   Patient presents with    Fatigue     Difficulty ambulating. SUBJECTIVE:   Cece Gallo seen and examined. S/P paracentesis 1/10/2023 drained 7850 cc fluid and 2023 drained 4120 cc fluid. Family is at bedside. Lying in bed and lethargic. Just medicated for generalized pain and does not answer questions appropriately. Family and RN state he is to sign onto Hospice today. Past Medical History:   has a past medical history of Diabetes mellitus ([facility] [address] Past SurgicalHistory:   has a past surgical history that includes Paracentesis (Left, 01/10/2023) and Paracentesis (Left, 2023). Allergies:Patient has no known allergies. Home Medications:   Prior to Admission medications    Medication Sig Start Date End Date Taking?  Authorizing Provider   insulin glargine (LANTUS SOLOSTAR) 100 UNIT/ML injection pen 50 units at bedtime 10/14/20   Kimberly Rubin MD   insulin lispro, 1 Unit Dial, (HUMALOG KWIKPEN) 100 UNIT/ML SOPN 16 units with each meals 10/14/20   Kimberly Rubin MD   Insulin Pen Needle (NOVOFINE) 32G X 6 MM MISC qid  Patient not taking: No sig reported 10/6/20   Kimberly Rubin MD   OneTouch Delica Lancets 09Z MISC Qid on insulin 10/6/20   Kimberly Rubin MD   blood glucose test strips (ONETOUCH VERIO) strip Qid on insulin e 11.65 10/6/20   Kimberly Rubin MD   Blood Glucose Monitoring Suppl (Olivier Solo) w/Device KIT As directed 10/6/20   Kimberly Rubin MD   insulin glargine (LANTUS SOLOSTAR) 100 UNIT/ML injection pen 80 units at bedtime 10/6/20   Kimberly Rubin MD   insulin lispro, 1 Unit Dial, (HUMALOG KWIKPEN) 100 UNIT/ML SOPN 20 units at each meals 10/6/20   Kimberly Rubin MD   Insulin Pen Needle (NOVOFINE) 32G X 6 MM MISC qid 10/6/20   Montserrat Morrow MD   atorvastatin (LIPITOR) 40 MG tablet Take 1 tablet by mouth daily  Patient not taking: No sig reported 10/5/20   Eric Morales DO   lisinopril (PRINIVIL;ZESTRIL) 20 MG tablet Take 1 tablet by mouth daily  Patient not taking: No sig reported 10/5/20   Eric Morales DO        Family History: No family history on file. SocialHistory:    Social History     Socioeconomic History    Marital status:      Spouse name: Not on file    Number of children: Not on file    Years of education: Not on file    Highest education level: Not on file   Occupational History    Not on file   Tobacco Use    Smoking status: Never    Smokeless tobacco: Never   Vaping Use    Vaping Use: Never used   Substance and Sexual Activity    Alcohol use: Never    Drug use: Never    Sexual activity: Not on file   Other Topics Concern    Not on file   Social History Narrative    Not on file     Social Determinants of Health     Financial Resource Strain: Not on file   Food Insecurity: Not on file   Transportation Needs: Not on file   Physical Activity: Not on file   Stress: Not on file   Social Connections: Not on file   Intimate Partner Violence: Not on file   Housing Stability: Not on file        ROS:   Review of Systems   Constitutional: Negative. Negative for chills, fatigue and fever. HENT: Negative. Negative for congestion, ear pain, sore throat and trouble swallowing. Eyes: Negative. Negative for visual disturbance. Respiratory: Negative. Negative for cough, shortness of breath and wheezing. Cardiovascular: Negative. Negative for chest pain, palpitations and leg swelling. Gastrointestinal:  Negative for abdominal distention, abdominal pain (generalized discomfort), diarrhea, nausea and vomiting. Endocrine: Negative. Genitourinary: Negative. Negative for difficulty urinating, dysuria and hematuria. Musculoskeletal: Negative.   Negative for back pain.   Skin: Negative. Negative for color change, rash and wound. Neurological:  Positive for weakness (generalized). Negative for dizziness, light-headedness, numbness and headaches. Hematological: Negative. Does not bruise/bleed easily. Psychiatric/Behavioral: Negative. The patient is not nervous/anxious. All other systems reviewed and are negative. Objective:   Vitals: BP 97/62   Pulse 88   Temp 97.1 °F (36.2 °C) (Axillary)   Resp 18   Ht 5' 8\" (1.727 m)   Wt 263 lb 11.2 oz (119.6 kg)   SpO2 97%   BMI 40.10 kg/m²      Physical Examination:      Physical Exam  Constitutional:       General: He is not in acute distress. Appearance: Normal appearance. He is not ill-appearing. HENT:      Head: Normocephalic. Nose: No congestion. Cardiovascular:      Rate and Rhythm: Normal rate. Heart sounds: Normal heart sounds. Pulmonary:      Effort: Pulmonary effort is normal.   Abdominal:      General: Bowel sounds are normal. There is no distension. Musculoskeletal:         General: Normal range of motion. Cervical back: Normal range of motion. Right lower leg: No edema. Left lower leg: No edema. Skin:     General: Skin is warm and dry. Neurological:      Mental Status: He is lethargic. Narrative   EXAMINATION:   CT OF THE ABDOMEN AND PELVIS WITH CONTRAST 1/10/2023 9:41 am       TECHNIQUE:   CT of the abdomen and pelvis was performed with the administration of   intravenous contrast. Multiplanar reformatted images are provided for review. Automated exposure control, iterative reconstruction, and/or weight based   adjustment of the mA/kV was utilized to reduce the radiation dose to as low   as reasonably achievable.        COMPARISON:   CTA chest dated 06/09/2022       HISTORY:   ORDERING SYSTEM PROVIDED HISTORY: CIRRHOSIS   TECHNOLOGIST PROVIDED HISTORY:   Reason for exam:->CIRRHOSIS   Additional Contrast?->None   What reading provider will be dictating this exam?->CRC       FINDINGS:   Lower Chest: Lung bases are clear. Organs: Liver is heterogeneous with multifocal areas of hypoenhancement   including a coalescent area occupying much of the right hepatic lobe 14 cm in   dimension of indeterminate significance although hepatocellular carcinoma of   diffuse involvement not excluded with multifocal other areas scattered   throughout. Perihepatic ascites. Gallbladder contains numerous stones in   the dependent portion neck of the gallbladder of cholelithiasis without wall   thickening. .  Pancreas unremarkable. Spleen enlarged to 14.8 cm in   craniocaudal dimension. Adrenals without nodule. Kidneys without suspicious   renal lesion and no hydronephrosis. Low-attenuation areas bilateral of renal   cortical cysts as well as bilateral renal cortical scarring atrophy. GI/Bowel: No focal thickening or disproportion dilatation of bowel. No   inflammatory findings. Pelvis: No suspicious pelvic lesion or bulky pelvic adenopathy/free fluid. Peritoneum/Retroperitoneum: Scattered mildly enlarged retroperitoneal lymph   nodes measuring up to a periaortic left 17 mm lymph node. Aortocaval 2 cm   lymph node along with 2 cm periportal lymph nodes. Mesenteric edema with   moderate volume abdominopelvic ascites. Vasculature: Grossly normal caliber of abdominal aorta and vasculature       Bones/Soft Tissues: No acute osseous or soft tissue findings. Fat containing   left direct inguinal hernia. Impression   Progressive hepatic disease with low-attenuation areas scattered throughout   diffusely becoming coalescent or confluent up to 14 cm in the right hepatic   lobe occupying much of the right hepatic dome appears grossly progressed from   06/09/2022 CTA chest comparison with partial imaging was performed of the   upper abdomen. Underline hepatocellular carcinoma with diffuse involvement   not excluded.   Perihepatic ascites with sequela portal hypertension including   splenomegaly. Periportal and retroperitoneal adenopathy additionally noted. Moderate ascites           1/10/2023:   Impression   1. Status post technically successful ultrasound-guided paracentesis. Korin Khan is a Male of 76 years age, referred for Ultrasound Guided Paracentesis. PROCEDURE: Survey of the abdomen showed large amount of ascites fluid. After obtaining informed consent, the patient was positioned supine on the sonography table. Using ultrasound, the skin over the left hemiabdomen was locally anesthetized with 1% lidocaine. Following that, a Yueh needle was advanced into the fluid    pocket using ultrasound visualization. 7850cc, of clear yellow fluid were aspirated and sent for cytology, and pathology. The needle was removed, and hemostasis was obtained with pressure. A Band-Aid was placed. Post procedure images did not demonstrate hemorrhage at the target site. The patient tolerated the procedure well. The patient left the department in good condition. A radiology nurse was in presence monitoring vital signs, assisting throughout the procedure. 1/17/2023:   Impression   1. Status post technically successful ultrasound-guided paracentesis. Korin Khan is a Male of 76 years age, referred for Ultrasound Guided Paracentesis. PROCEDURE: Survey of the abdomen showed large amount of ascites fluid. After obtaining informed consent, the patient was positioned supine on the sonography table. Using ultrasound, the skin over the left hemiabdomen was locally anesthetized with 1% lidocaine. Following that, a Yueh needle was advanced into the fluid    pocket using ultrasound visualization. 4120cc, of clear yellow fluid were aspirated and sent for cytology, and pathology. The needle was removed, and hemostasis was obtained with pressure. A Band-Aid was placed.      Post procedure images did not demonstrate hemorrhage at the target site. The patient tolerated the procedure well. The patient left the department in good condition. A radiology nurse was in presence monitoring vital signs, assisting throughout the procedure. ASSESSMENT:   Ascites: S/P paracentesis 1/10/2023 drained for 7850 cc fluid and 1/17/2023 drained 4120 cc fluid. Abdomen currently not distended. 2. Large hepatic mass of unknown etiology. IR requested to do percutaneous CT guided needle biopsy. Have been unable to do d/t elevated INR. PLAN:    INR too elevated to do Liver biopsy due to high risk of bleed. 2.   If patient does sign on with Hospice, then will not plan for Liver biopsy. If plans change, then can consult IR to proceed. Otherwise will sign off IR services and no further follow up needed.  Plan discussed with RN    Electronically signed by BELKIS Capone CNP on 1/20/23 at 1:29 PM EST

## 2023-01-20 NOTE — PROGRESS NOTES
Nursing report received from first shift nurse. Pt in bed resting. Bed in lowest position. Side rails x2, call light in reach. Family at bedside. Nurse will monitor Pt throughout the night     Family refusing all treatment for pt. Wants pt to remain comfortable. Will continue to monitor pt throughout the night.

## 2023-01-20 NOTE — CONSULTS
1630 eferral visit completed with pt and multiple family members present. Pt is lethargic and did not particulate in meeting. Hospice Philosophy, Medicare Hospice Election/ Medicare Benefit Periods, Discontinuing Hospice Care/Right to Revoke, levels of care, 24/7 on-call & Care Team, Hospice Patient Information Booklet reviewed during referral visit. Encouraged reading of [place], and pages 4, 7, 9, 15, of Hospice Patient Information Booklet reviewed. Appropriateness previously obtained from Dr. Loni Bynum with MD at [phone] with diagnosis reviewed and meds reviewed and reconciled at this time. Consents signed at this time. Family is wishing for pt to go to care center under routine care while they find placement. Call to [facility] with transport arranged for 2100 this evening. Spoke with bedside nurse Kendall and updated on results of meeting/voiced understanding, Kendall to obtain discharge readmit orders. 24/7 On call reviewed with questions, concerns or changes. Denies question or concerns prior to leaving.

## 2023-01-20 NOTE — DISCHARGE SUMMARY
Hospital Medicine Discharge Summary    Blas Mcburney  :  1945  MRN:  [id number]    Admit date:  2023  Discharge date:  23    Admitting Physician:  Rohan Ross DO  Primary Care Physician:  [address] [place]      Discharge Diagnoses:    Principal Problem:    Suicidal ideation  Active Problems:    General weakness    Palliative care encounter    Advanced care planning/counseling discussion    Goals of care, counseling/discussion    Encounter for hospice care discussion    At high risk for malnutrition    Dysphagia  Resolved Problems:    * No resolved hospital problems. *      Hospital Course:   Blas Mcburney is a 76 y.o. male that was admitted and treated at [facility] from home with initial suicidal ideation failure to thrive poor oral intake weakness and fatigue. Patient was admitted to the medical service eval by psychiatry and work-up revealed a large liver mass. Patient was able to be cleared from a psychiatry standpoint as further details revealed that he was not actually suicidal just depressed and anhedonic. Patient was evaluated by gastroenterology and oncology found to have a profoundly elevated CEA level with large liver mass which is radiographically diagnostic for hepatocellular carcinoma. Work-up was planned with liver biopsy and oncology consultation however patient deteriorated before biopsy could be performed. Patient requested hospice consultation was made DNR and started on comfort measures. Patient was seen eval by palliative care will be discharged to hospice center. Principal Problem:    Suicidal ideation  Active Problems:    General weakness    Palliative care encounter    Advanced care planning/counseling discussion    Goals of care, counseling/discussion    Encounter for hospice care discussion    At high risk for malnutrition    Dysphagia  Resolved Problems:    * No resolved hospital problems. *      Patient was seen by the following consultants while admitted to [facility]:   Consults:  [address] [facility] [address] TO GI  IP CONSULT TO [place]  IP CONSULT TO INTERVENTIONAL RADIOLOGY  IP CONSULT TO HEM/ONC  IP CONSULT TO INTERVENTIONAL RADIOLOGY  IP CONSULT TO PALLIATIVE CARE  IP CONSULT TO DIETITIAN  IP CONSULT TO SPIRITUAL SERVICES  IP CONSULT TO HOSPICE    Physical Exam:   Constitutional:       Appearance: He is obese. He is ill-appearing and toxic-appearing. Comments: lethargic   HENT:      Head: Normocephalic and atraumatic. Mouth/Throat:      Mouth: Mucous membranes are dry. Pharynx: Oropharynx is clear. Eyes:      Conjunctiva/sclera: Conjunctivae normal.      Pupils: Pupils are equal, round, and reactive to light. Cardiovascular:      Rate and Rhythm: Normal rate. Heart sounds: No murmur heard. No gallop. Pulmonary:      Breath sounds: No wheezing, rhonchi or rales. Abdominal:      General: There is distension. Tenderness: There is abdominal tenderness. There is no guarding. Musculoskeletal:         General: Swelling present. Right lower leg: Edema present. Left lower leg: Edema present. Neurological:      Comments: Lethargic, groans to stimulation   Psychiatric:         Mood and Affect: Mood normal.         Thought Content: Thought content normal.     Significant Diagnostic Studies:    FINDINGS:   Lower Chest: Lung bases are clear. Organs: Liver is heterogeneous with multifocal areas of hypoenhancement   including a coalescent area occupying much of the right hepatic lobe 14 cm in   dimension of indeterminate significance although hepatocellular carcinoma of   diffuse involvement not excluded with multifocal other areas scattered   throughout. Perihepatic ascites. Gallbladder contains numerous stones in   the dependent portion neck of the gallbladder of cholelithiasis without wall   thickening. Enrique Bridges Pancreas unremarkable. Spleen enlarged to 14.8 cm in   craniocaudal dimension. Adrenals without nodule. Kidneys without suspicious   renal lesion and no hydronephrosis. Low-attenuation areas bilateral of renal   cortical cysts as well as bilateral renal cortical scarring atrophy. GI/Bowel: No focal thickening or disproportion dilatation of bowel. No   inflammatory findings. Pelvis: No suspicious pelvic lesion or bulky pelvic adenopathy/free fluid. Peritoneum/Retroperitoneum: Scattered mildly enlarged retroperitoneal lymph   nodes measuring up to a periaortic left 17 mm lymph node. Aortocaval 2 cm   lymph node along with 2 cm periportal lymph nodes. Mesenteric edema with   moderate volume abdominopelvic ascites. Vasculature: Grossly normal caliber of abdominal aorta and vasculature       Bones/Soft Tissues: No acute osseous or soft tissue findings. Fat containing   left direct inguinal hernia. Impression   Progressive hepatic disease with low-attenuation areas scattered throughout   diffusely becoming coalescent or confluent up to 14 cm in the right hepatic   lobe occupying much of the right hepatic dome appears grossly progressed from   06/09/2022 CTA chest comparison with partial imaging was performed of the   upper abdomen. Underline hepatocellular carcinoma with diffuse involvement   not excluded. Perihepatic ascites with sequela portal hypertension including   splenomegaly. Periportal and retroperitoneal adenopathy additionally noted. Moderate ascites           Discharge Medications:      See transfer orders, auto-populated MAR is not accurate with recent Epic updates. Disposition:   Discharged to Hospice    Condition at discharge: Terminal     Activity: activity as tolerated    Total time taken for discharging this patient: 40 minutes. Greater than 70% of time was spent focused exclusively on this patient.  Time was taken to review chart, discuss plans with consultants, reconciling medications, discussing plan answering questions with patient.      SignedJacob Valentine DO  1/20/2023, 4:49 PM

## 2023-01-23 NOTE — PROGRESS NOTES
Physical Therapy  Facility/Department: [facility] MED SURG [id number]  Physical Therapy Discharge      NAME: Katia Lundy    : 1945 (76 y.o.)  MRN: [id number]    Account: [de-identified]  Gender: male      Patient has been discharged from acute care hospital. DC patient from current PT program.      Electronically signed by Neha Zendejas PT on 23 at 4:40 PM EST

## 2023-02-14 NOTE — PROGRESS NOTES
Physician Progress Note      PATIENT:               Corina Garcia  CSN #:                  [id number]  :                       1945  ADMIT DATE:       2023 6:37 PM  [address] [place] DATE:        2023 5:50 PM  RESPONDING  PROVIDER #:        Charla Cosby DO          QUERY TEXT:    Pt admitted with liver cell carcinoma and has conflicting documentation of   Severe protein calorie malnutrition documented in theattending  PN and At   reisk for malnutrition documented in the Dietician Assessment. Please further   specify type of malnutrition with documentation in the medical record. The medical record reflects the following:  Risk Factors: Liver cell carcinoma, SALOME, Cachexia, suicidal ideations,   cirrhosis, DNR, hepatic encephlaopathy  Clinical Indicators: Cachexia, Dr. Kylee Mathew  PN: \"Severe protein calorie   malnutrition: consult dietary. \"  Dietician assessment: \"Malnutrition   Status: At risk for malnutrition (Comment) ([id number])   Context:    Social/Environmental Circumstances Findings of the 6 clinical characteristics   of malnutrition: Energy Intake:  50% or less estimated energy requirements for   1 month or longer; Weight Loss:  Unable to assess; Body Fat Loss:  No   significant body fat loss; Muscle Mass Loss:  No significant muscle mass loss; Fluid Accumulation:  Unable to assess;  Strength:  N  Treatment:  Dietician coonsult, diabetic supplement TID, Carb control 5 diet,   I&O, daily weights    ASPEN Criteria:    https://aspenjournals. onlinelibrary. lopez. com/doi/full/10.1177/112194881792024  5    Thank you,  Fina Flores   Clinical Documentation Improvement Specialist  W: [phone]  Options provided:  -- This patient is at risk for malnutrition with severe protein calorie   malnutrition ruled out  -- Severe Protein calorie malnutrition confirmed  -- Other - I will add my own diagnosis  -- Disagree - Not applicable / Not valid  -- Disagree - Clinically unable to determine / Unknown  -- Refer to Clinical Documentation Reviewer    PROVIDER RESPONSE TEXT:    This patient has severe protein calorie malnutrition confirmed.     Query created by: Bull Delcid on 2/13/2023 3:43 PM      Electronically signed by:  Nixon Cosby DO 2/13/2023 7:45 PM

## 2023-02-15 NOTE — PROGRESS NOTES
Physician Progress Note      PATIENT:               LORENA NEELY  CSN #:                  [id number]  :                       1945  ADMIT DATE:       2023 6:37 PM  [facility] DATE:        2023 5:50 PM  RESPONDING  PROVIDER #:        Alexei Cosby DO          QUERY TEXT:    Patient admitted per H&P with \"SALOME  Secondary to dehydration and urinary   retention. SCR 1.60 baseline 1.2\"  Noted documentation of \"Hepato-Renal   syndrome Vs Acute kidney injury.\" per Oncology/Hematology consult and \"SALOME   with concern for hepatorenal: nephrology consult, baseline Cr was around 1.4.   urine siodium/creatinine, pending.\" in the  Dr. Cosby PN. Nephrology   consult not completed.  Please document in the progress notes and discharge summary if the diagnosis   of Hepato-renal syndrome has been:    The medical record reflects the following:  Risk Factors: liver mass, SALOME  Clinical Indicators: BUN 38, 37, 38, 53, 72, 82; sCr/GFR:  1.6/43.5,   1.54/45.5, 1.67/41.3, 2.44/26.2, 2.47/25.8, 2.61/24.2; Alk Phos 289, 220, 245,   187, 175, 195; AST 71, 52, 42, 43, 39, 44; Bilirubin 2.1, 1.7, 2.0, 1.2, 1.1;   .5; Palliative care: \"No scleral icterus\"; DC Summary: \" Patient was   evaluated by gastroenterology and oncology found to have a profoundly elevated   CEA level with large liver mass which is radiographically diagnostic for   hepatocellular carcinoma.  Work-up was planned with liver biopsy and oncology   consultation however patient deteriorated before   Treatment: IR consult, US-guided Paracentesis, IVFB 1L, IV albumin, urine   studies, serial creatinine, monitoring    Thank you,  Nancy Frank   Clinical Documentation Improvement Specialist  W: [phone]  Options provided:  -- Hepato-renal syndrome confirmed  -- Hepato-renal syndrome ruled out  -- Other - I will add my own diagnosis  -- Disagree - Not applicable / Not valid  -- Disagree - Clinically unable to determine / Unknown  -- Refer to Clinical  Documentation Reviewer    PROVIDER RESPONSE TEXT:    Provider is clinically unable to determine a response to this query. HRS is a diagnosis of exclusion, pt did not survive long enough for dianosis.     Query created by: Cassandra Parsons on 2/14/2023 2:26 PM      Electronically signed by:  Loulou Cosby DO 2/14/2023 7:30 PM
